# Patient Record
Sex: FEMALE | Race: WHITE | NOT HISPANIC OR LATINO | Employment: FULL TIME | URBAN - METROPOLITAN AREA
[De-identification: names, ages, dates, MRNs, and addresses within clinical notes are randomized per-mention and may not be internally consistent; named-entity substitution may affect disease eponyms.]

---

## 2017-01-16 ENCOUNTER — MYC MEDICAL ADVICE (OUTPATIENT)
Dept: FAMILY MEDICINE | Facility: CLINIC | Age: 47
End: 2017-01-16

## 2017-01-16 DIAGNOSIS — R20.0 BILATERAL LEG NUMBNESS: Primary | ICD-10-CM

## 2017-01-16 NOTE — TELEPHONE ENCOUNTER
Please see WEbook message along with the following office visit notes from 12/21/16:  (R20.2) Numbness and tingling of both legs  Comment: Intermittent bilateral leg weakness and numbness.? Etiology. Will check labs.                                                                                                                                                                                                                                             R/O Rule out restless leg syndrome, vitamin deficiency  Plan: CBC with platelets, Basic metabolic panel, TSH          with free T4 reflex, Vitamin B12, Iron and iron        binding capacity        Follow up based on labs  May need neurology referral depending on results. Also consider scanning patient's back will see if her symptoms resolve before then.    Please advise. Thank you.  Laura Mathias, RN, BSN  [facility]

## 2017-01-18 ENCOUNTER — HOSPITAL ENCOUNTER (OUTPATIENT)
Dept: MRI IMAGING | Facility: CLINIC | Age: 47
Discharge: HOME | End: 2017-01-18
Attending: FAMILY MEDICINE | Admitting: FAMILY MEDICINE
Payer: COMMERCIAL

## 2017-01-18 DIAGNOSIS — R20.0 BILATERAL LEG NUMBNESS: ICD-10-CM

## 2017-01-18 PROCEDURE — 72148 MRI LUMBAR SPINE W/O DYE: CPT

## 2017-01-23 ENCOUNTER — OFFICE VISIT (OUTPATIENT)
Dept: FAMILY MEDICINE | Facility: CLINIC | Age: 47
End: 2017-01-23
Payer: COMMERCIAL

## 2017-01-23 VITALS
BODY MASS INDEX: 35.51 KG/M2 | HEIGHT: 64 IN | SYSTOLIC BLOOD PRESSURE: 132 MMHG | OXYGEN SATURATION: 100 % | DIASTOLIC BLOOD PRESSURE: 88 MMHG | HEART RATE: 97 BPM | WEIGHT: 208 LBS | TEMPERATURE: 98.4 F

## 2017-01-23 DIAGNOSIS — J02.9 ACUTE PHARYNGITIS, UNSPECIFIED ETIOLOGY: Primary | ICD-10-CM

## 2017-01-23 DIAGNOSIS — R07.0 THROAT PAIN: ICD-10-CM

## 2017-01-23 LAB
DEPRECATED S PYO AG THROAT QL EIA: NORMAL
MICRO REPORT STATUS: NORMAL
SPECIMEN SOURCE: NORMAL

## 2017-01-23 PROCEDURE — 87081 CULTURE SCREEN ONLY: CPT | Performed by: PHYSICIAN ASSISTANT

## 2017-01-23 PROCEDURE — 87880 STREP A ASSAY W/OPTIC: CPT | Performed by: PHYSICIAN ASSISTANT

## 2017-01-23 PROCEDURE — 99213 OFFICE O/P EST LOW 20 MIN: CPT | Performed by: PHYSICIAN ASSISTANT

## 2017-01-23 NOTE — MR AVS SNAPSHOT
After Visit Summary   1/23/2017    Hayde Monique    MRN: [id number]           Patient Information     Date Of Birth          1970        Visit Information        Provider Department      1/23/2017 5:40 PM Ludy Sepulveda PA-C [facility]        Today's Diagnoses     Acute pharyngitis, unspecified etiology    -  1     Throat pain           Care Instructions    No evidence for strep or oral lesion.  May be coming down with a new viral process.  Continue supportive measures for now.  Re-check anytime with concerns.  Will call and notify you should your strep culture return positive and treat accordingly at that time.    Electronically Signed By: Ludy Sepulveda PA-C          Follow-ups after your visit        Who to contact     If you have questions or need follow up information about today's clinic visit or your schedule please contact [facility] directly at [phone].  Normal or non-critical lab and imaging results will be communicated to you by MyChart, letter or phone within 4 business days after the clinic has received the results. If you do not hear from us within 7 days, please contact the clinic through [employer]ent[employer] or phone. If you have a critical or abnormal lab result, we will notify you by phone as soon as possible.  Submit refill requests through [employer] or call your pharmacy and they will forward the refill request to us. Please allow 3 business days for your refill to be completed.          Additional Information About Your Visit        MyChart Information     [employer] gives you secure access to your electronic health record. If you see a primary care provider, you can also send messages to your care team and make appointments. If you have questions, please call your primary care clinic.  If you do not have a primary care provider, please call [phone] and they will assist you.        Care EveryWhere ID     This is your Care EveryWhere ID. This  "could be used by other organizations to access your [facility] medical records  [id number]        Your Vitals Were     Pulse Temperature Height BMI (Body Mass Index) Pulse Oximetry Breastfeeding?    97 98.4  F (36.9  C) (Oral) 5' 4\" (1.626 m) 35.69 kg/m2 100% No       Blood Pressure from Last 3 Encounters:   01/23/17 132/88   12/21/16 126/84   08/22/16 130/86    Weight from Last 3 Encounters:   01/23/17 208 lb (94.348 kg)   12/21/16 208 lb (94.348 kg)   08/22/16 204 lb 4 oz (92.647 kg)              We Performed the Following     Beta strep group A culture     Rapid strep screen        Primary Care Provider Office Phone # Fax #    MARY Hansen [facility] [phone]       [facility] [address] [place] [address]        Thank you!     Thank you for choosing [facility]  for your care. Our goal is always to provide you with excellent care. Hearing back from our patients is one way we can continue to improve our services. Please take a few minutes to complete the written survey that you may receive in the mail after your visit with us. Thank you!             Your Updated Medication List - Protect others around you: Learn how to safely use, store and throw away your medicines at www.disposemymeds.org.          This list is accurate as of: 1/23/17  6:30 PM.  Always use your most recent med list.                   Brand Name Dispense Instructions for use    busPIRone 5 MG tablet    BUSPAR    90 tablet    Take 1 tablet (5 mg) by mouth daily       fluticasone 50 MCG/ACT spray    FLONASE    16 g    Spray 2 sprays into both nostrils daily       minocycline 100 MG tablet    DYNACIN    180 tablet    Take 1 tablet (100 mg) by mouth 2 times daily       PARoxetine 40 MG tablet    PAXIL    90 tablet    Take 1 tablet (40 mg) by mouth daily       phentermine 30 MG capsule     30 capsule    Take 1 capsule (30 mg) by mouth every morning         "

## 2017-01-23 NOTE — PROGRESS NOTES
"  SUBJECTIVE:                                                    Hayde Monique is a 46 year old female who presents to clinic today for the following health issues:      Acute Illness   Acute illness concerns: sore throat, ear pain that began this morning. Feels sore when she swallows on the L.  Non-smoker.  No respiratory sx.  Seems actually bother her less when she eats/drinks, but notices with regular swallowing.  Nothing got stuck.  No hx of thyroid disease.  No choking.    Onset: started last week, and ear pain started a few days ago     Fever: no    Chills/Sweats: no    Headache (location?): no    Sinus Pressure:no    Conjunctivitis:  no    Ear Pain: YES- left ear pain    Rhinorrhea: no    Congestion: no    Sore Throat: YES, feels like something sharp is poking her     Cough: no    Wheeze: no    Decreased Appetite: no    Nausea: no    Vomiting: no    Diarrhea:  no    Dysuria/Freq.: no    Fatigue/Achiness: no    Sick/Strep Exposure: no     Therapies Tried and outcome:     Problem list and histories reviewed & adjusted, as indicated.  Additional history: as documented  Problem list, Medication list, Allergies, and Medical/Social/Surgical histories [facility] and updated as appropriate.    Current Outpatient Prescriptions   Medication     busPIRone (BUSPAR) 5 MG tablet     PARoxetine (PAXIL) 40 MG tablet     phentermine 30 MG capsule     fluticasone (FLONASE) 50 MCG/ACT nasal spray     minocycline (DYNACIN) 100 MG tablet     No current facility-administered medications for this visit.      No Known Allergies    O:  /88 mmHg  Pulse 97  Temp(Src) 98.4  F (36.9  C) (Oral)  Ht 5' 4\" (1.626 m)  Wt 208 lb (94.348 kg)  BMI 35.69 kg/m2  SpO2 100%  Breastfeeding? No   Constitutional: Pt is a healthy appearing female, in no distress.  Eyes: Conjunctiva clear.  Ears: External ears and TMs normal BL.  Nose: Septum midline, nasal mucosa pink and moist. No discharge.  Mouth / Throat: Normal dentition.  No " oral lesions. Pharynx non erythematous, tonsils without hypertrophy.  Neck: Supple, no enlarged LN, trachea midline. No thyromegaly or nodularity.  Heart: Normal S1 and S2, RRR, no appreciable murmurs, rubs, or gallops.  Lungs: CTA bilaterally, no rales, rhonchi, or wheezing appreciated.    RSS negative    A/P:    ICD-10-CM    1. Acute pharyngitis, unspecified etiology J02.9    2. Throat pain R07.0 Rapid strep screen     Beta strep group A culture   See pt instructions.    Patient Instructions   No evidence for strep or oral lesion.  May be coming down with a new viral process.  Continue supportive measures for now.  Re-check anytime with concerns.  Will call and notify you should your strep culture return positive and treat accordingly at that time.    Electronically Signed By: Ludy Sepulveda PA-C

## 2017-01-23 NOTE — NURSING NOTE
"Chief Complaint   Patient presents with     Pharyngitis     feels like something sharp is in her throat    Pulse 97  Temp(Src) 98.4  F (36.9  C) (Oral)  Ht 5' 4\" (1.626 m)  Wt 208 lb (94.348 kg)  BMI 35.69 kg/m2  SpO2 100% Body Mass Index is Body mass index is 35.69 kg/(m^2).  BP completed using cuff size : large right arm  Marcella Milton MA            "

## 2017-01-24 NOTE — PATIENT INSTRUCTIONS
No evidence for strep or oral lesion.  May be coming down with a new viral process.  Continue supportive measures for now.  Re-check anytime with concerns.  Will call and notify you should your strep culture return positive and treat accordingly at that time.    Electronically Signed By: Ludy Sepulveda PA-C

## 2017-01-25 LAB
BACTERIA SPEC CULT: NORMAL
MICRO REPORT STATUS: NORMAL
SPECIMEN SOURCE: NORMAL

## 2017-01-26 ENCOUNTER — MYC MEDICAL ADVICE (OUTPATIENT)
Dept: FAMILY MEDICINE | Facility: CLINIC | Age: 47
End: 2017-01-26

## 2017-01-26 NOTE — TELEPHONE ENCOUNTER
Please see RubyRide message. Please advise on MRI results when able. Thank you.  Laura Mathias RN, BSN  [facility]

## 2017-01-30 NOTE — PROGRESS NOTES
Quick Note:    Reviewed in the clinic with patient.  Electronically Signed By: Ludy Sepulveda PA-C    ______

## 2017-02-13 ENCOUNTER — MYC REFILL (OUTPATIENT)
Dept: FAMILY MEDICINE | Facility: CLINIC | Age: 47
End: 2017-02-13

## 2017-02-13 DIAGNOSIS — E66.01 MORBID OBESITY DUE TO EXCESS CALORIES (H): ICD-10-CM

## 2017-02-13 RX ORDER — PHENTERMINE HYDROCHLORIDE 30 MG/1
30 CAPSULE ORAL EVERY MORNING
Qty: 30 CAPSULE | Refills: 0 | Status: SHIPPED | OUTPATIENT
Start: 2017-02-13 | End: 2017-03-15

## 2017-02-13 NOTE — TELEPHONE ENCOUNTER
Message from [employer]:  Original authorizing provider: Karen Weiler, MD [place] Monique would like a refill of the following medications:  phentermine 30 MG capsule [Karen Weiler, MD]    Preferred pharmacy: [id number], [place] - [address] [facility] [address]    Comment:  Hello. I thought the doctor gave me refills for this medication for six months? I only had a 30-day prescription. Am I allowed more? Thank you! Seems to be working well! No issues.

## 2017-03-02 ENCOUNTER — ALLIED HEALTH/NURSE VISIT (OUTPATIENT)
Dept: NURSING | Facility: CLINIC | Age: 47
End: 2017-03-02
Payer: COMMERCIAL

## 2017-03-02 ENCOUNTER — TELEPHONE (OUTPATIENT)
Dept: NURSING | Facility: CLINIC | Age: 47
End: 2017-03-02

## 2017-03-02 VITALS
OXYGEN SATURATION: 99 % | HEART RATE: 92 BPM | SYSTOLIC BLOOD PRESSURE: 134 MMHG | DIASTOLIC BLOOD PRESSURE: 86 MMHG | WEIGHT: 198 LBS | BODY MASS INDEX: 33.99 KG/M2

## 2017-03-02 DIAGNOSIS — Z23 NEED FOR VACCINATION AGAINST HEPATITIS A: Primary | ICD-10-CM

## 2017-03-02 PROCEDURE — 90471 IMMUNIZATION ADMIN: CPT

## 2017-03-02 PROCEDURE — 90632 HEPA VACCINE ADULT IM: CPT

## 2017-03-02 NOTE — PROGRESS NOTES
Hayde Monique is being followed for Blood Pressure management.    NURSING ASSESSMENT/PLAN:  Blood pressure reading today is at the provider specified goal of <140/90.    Current blood pressure medication(s):  None - patient is on phentermine for weight loss  1.  The patient will continue current medication regimen unchanged.     2.  We will not be checking a metabolic lab panel today.      3.  Follow up instructions include:     Next Provider visit: Follow up in as needed.    SUBJECTIVE:                                                    The patient is taking medication as prescribed and is tolerating well.   Patient is not monitoring Blood Pressure at home.     Out of the following complicating factors: Cough, Headache, Lightheadedness, Shortness of breath, Fatigue, Nausea, Sexual Dysfunction, New onset of swelling or edema, Weakness and New onset of Chest Pain, the patient reports:  None    OBJECTIVE:                                                    Is pulse 55 or greater? - Yes  Pulse Readings from Last 1 Encounters:   03/02/17 92     Today's BP completed using cuff size: large on left side  arm.  BP Readings from Last 3 Encounters:   03/02/17 134/86   01/23/17 132/88   12/21/16 126/84       Current Outpatient Prescriptions   Medication Sig Dispense Refill     phentermine 30 MG capsule Take 1 capsule (30 mg) by mouth every morning 30 capsule 0     busPIRone (BUSPAR) 5 MG tablet Take 1 tablet (5 mg) by mouth daily 90 tablet 3     PARoxetine (PAXIL) 40 MG tablet Take 1 tablet (40 mg) by mouth daily 90 tablet 3     fluticasone (FLONASE) 50 MCG/ACT nasal spray Spray 2 sprays into both nostrils daily 16 g 5     minocycline (DYNACIN) 100 MG tablet Take 1 tablet (100 mg) by mouth 2 times daily (Patient taking differently: Take 100 mg by mouth 2 times daily ) 180 tablet 3     Potassium   Date Value Ref Range Status   12/21/2016 4.0 3.4 - 5.3 mmol/L Final     Creatinine   Date Value Ref Range Status   12/21/2016 0.60  0.52 - 1.04 mg/dL Final     Urea Nitrogen   Date Value Ref Range Status   12/21/2016 13 7 - 30 mg/dL Final     GFR Estimate   Date Value Ref Range Status   12/21/2016 >90  Non  GFR Calc   >60 mL/min/1.7m2 Final      A baseline potassium, creatinine, BUN, GFR has been done within past 12 months    Education:  general discussion/verbal explanation  Limit dietary sodium intake between 1500-2000mg every day  Regular aerobic exercise.  Discussed habit change regarding diet/weight loss  Patient was given an opportunity to ask questions.    Patient verbalized understanding of this plan and is agreeable.    Laura Mathias RN

## 2017-03-02 NOTE — MR AVS SNAPSHOT
After Visit Summary   3/2/2017    Hayde Monique    MRN: [id number]           Patient Information     Date Of Birth          1970        Visit Information        Provider Department      3/2/2017 3:45 PM [facility]        Today's Diagnoses     Need for vaccination against hepatitis A    -  1       Follow-ups after your visit        Your next 10 appointments already scheduled     Mar 02, 2017  3:45 PM CST   Nurse Only with [facility]   [facility] [place] [facility])    [address] [place] [address] [phone]              Who to contact     If you have questions or need follow up information about today's clinic visit or your schedule please contact [facility] directly at [phone].  Normal or non-critical lab and imaging results will be communicated to you by MyChart, letter or phone within 4 business days after the clinic has received the results. If you do not hear from us within 7 days, please contact the clinic through [employer] or phone. If you have a critical or abnormal lab result, we will notify you by phone as soon as possible.  Submit refill requests through [employer] or call your pharmacy and they will forward the refill request to us. Please allow 3 business days for your refill to be completed.          Additional Information About Your Visit        MyChart Information     [employer] gives you secure access to your electronic health record. If you see a primary care provider, you can also send messages to your care team and make appointments. If you have questions, please call your primary care clinic.  If you do not have a primary care provider, please call [phone] and they will assist you.        Care EveryWhere ID     This is your Care EveryWhere ID. This could be used by other organizations to access your [facility] medical records  [id number]        Your Vitals Were     Pulse Pulse  Oximetry BMI (Body Mass Index)             92 99% 33.99 kg/m2          Blood Pressure from Last 3 Encounters:   03/02/17 134/86   01/23/17 132/88   12/21/16 126/84    Weight from Last 3 Encounters:   03/02/17 198 lb (89.8 kg)   01/23/17 208 lb (94.3 kg)   12/21/16 208 lb (94.3 kg)              We Performed the Following     HEPATITIS A VACCINE (ADULT)        Primary Care Provider Office Phone # Fax #    Daisy MARY Wilburn [phone]       [facility] [address] [place] [address]        Thank you!     Thank you for choosing [facility]  for your care. Our goal is always to provide you with excellent care. Hearing back from our patients is one way we can continue to improve our services. Please take a few minutes to complete the written survey that you may receive in the mail after your visit with us. Thank you!             Your Updated Medication List - Protect others around you: Learn how to safely use, store and throw away your medicines at www.disposemymeds.org.          This list is accurate as of: 3/2/17  3:33 PM.  Always use your most recent med list.                   Brand Name Dispense Instructions for use    busPIRone 5 MG tablet    BUSPAR    90 tablet    Take 1 tablet (5 mg) by mouth daily       fluticasone 50 MCG/ACT spray    FLONASE    16 g    Spray 2 sprays into both nostrils daily       minocycline 100 MG tablet    DYNACIN    180 tablet    Take 1 tablet (100 mg) by mouth 2 times daily       PARoxetine 40 MG tablet    PAXIL    90 tablet    Take 1 tablet (40 mg) by mouth daily       phentermine 30 MG capsule     30 capsule    Take 1 capsule (30 mg) by mouth every morning

## 2017-03-15 DIAGNOSIS — E66.01 MORBID OBESITY DUE TO EXCESS CALORIES (H): ICD-10-CM

## 2017-03-15 NOTE — TELEPHONE ENCOUNTER
phentermine 30 MG capsule      Last Written Prescription Date:  2/13/2017  Last Fill Quantity: 30 capsule,   # refills: 0  Last Office Visit with [facility], [facility] or [facility] prescribing provider: 1/23/2017  Future Office visit:       Routing refill request to provider for review/approval because:  Drug not on the [facility], [facility] or  [facility] refill protocol or controlled substance

## 2017-03-19 RX ORDER — PHENTERMINE HYDROCHLORIDE 30 MG/1
30 CAPSULE ORAL EVERY MORNING
Qty: 30 CAPSULE | Refills: 0 | Status: SHIPPED | OUTPATIENT
Start: 2017-03-19 | End: 2017-05-31

## 2017-03-27 ENCOUNTER — OFFICE VISIT (OUTPATIENT)
Dept: FAMILY MEDICINE | Facility: CLINIC | Age: 47
End: 2017-03-27
Payer: COMMERCIAL

## 2017-03-27 VITALS
SYSTOLIC BLOOD PRESSURE: 138 MMHG | OXYGEN SATURATION: 100 % | HEIGHT: 64 IN | TEMPERATURE: 98.1 F | HEART RATE: 103 BPM | BODY MASS INDEX: 33.8 KG/M2 | DIASTOLIC BLOOD PRESSURE: 96 MMHG | WEIGHT: 198 LBS

## 2017-03-27 DIAGNOSIS — Z80.3 FAMILY HISTORY OF BREAST CANCER: ICD-10-CM

## 2017-03-27 DIAGNOSIS — N63.12 BREAST LUMP ON RIGHT SIDE AT 1 O'CLOCK POSITION: Primary | ICD-10-CM

## 2017-03-27 PROCEDURE — 99213 OFFICE O/P EST LOW 20 MIN: CPT | Performed by: PHYSICIAN ASSISTANT

## 2017-03-27 NOTE — MR AVS SNAPSHOT
After Visit Summary   3/27/2017    Hayde Monique    MRN: [id number]           Patient Information     Date Of Birth          1970        Visit Information        Provider Department      3/27/2017 4:40 PM Ludy Sepulveda PA-C [facility]        Today's Diagnoses     Breast lump on right side at 1 o'clock position    -  1    Family history of breast cancer          Care Instructions    Unclear if this is a superficial cyst versus something more sinister.  Let's obtain further evaluation with diagnostic mammogram.  F/u pending results.    Electronically Signed By: Ludy Sepulveda PA-C          Follow-ups after your visit        Future tests that were ordered for you today     Open Future Orders        Priority Expected Expires Ordered    MA Diagnostic w Implants Bilateral Routine  3/27/2018 3/27/2017            Who to contact     If you have questions or need follow up information about today's clinic visit or your schedule please contact [facility] directly at [phone].  Normal or non-critical lab and imaging results will be communicated to you by [employer], letter or phone within 4 business days after the clinic has received the results. If you do not hear from us within 7 days, please contact the clinic through [employer] or phone. If you have a critical or abnormal lab result, we will notify you by phone as soon as possible.  Submit refill requests through [employer] or call your pharmacy and they will forward the refill request to us. Please allow 3 business days for your refill to be completed.          Additional Information About Your Visit        Latest [employer] Information     [employer] gives you secure access to your electronic health record. If you see a primary care provider, you can also send messages to your care team and make appointments. If you have questions, please call your primary care clinic.  If you do not have a primary care provider, please call  "[phone] and they will assist you.        Care EveryWhere ID     This is your Care EveryWhere ID. This could be used by other organizations to access your [facility] medical records  [id number]        Your Vitals Were     Pulse Temperature Height Pulse Oximetry Breastfeeding? BMI (Body Mass Index)    103 98.1  F (36.7  C) (Oral) 5' 4\" (1.626 m) 100% No 33.99 kg/m2       Blood Pressure from Last 3 Encounters:   03/27/17 (!) 138/96   03/02/17 134/86   01/23/17 132/88    Weight from Last 3 Encounters:   03/27/17 198 lb (89.8 kg)   03/02/17 198 lb (89.8 kg)   01/23/17 208 lb (94.3 kg)               Primary Care Provider Office Phone # Fax #    MARY Hansen [phone]       [facility] [address] [place] [address]        Thank you!     Thank you for choosing [facility]  for your care. Our goal is always to provide you with excellent care. Hearing back from our patients is one way we can continue to improve our services. Please take a few minutes to complete the written survey that you may receive in the mail after your visit with us. Thank you!             Your Updated Medication List - Protect others around you: Learn how to safely use, store and throw away your medicines at www.disposemymeds.org.          This list is accurate as of: 3/27/17  5:06 PM.  Always use your most recent med list.                   Brand Name Dispense Instructions for use    busPIRone 5 MG tablet    BUSPAR    90 tablet    Take 1 tablet (5 mg) by mouth daily       fluticasone 50 MCG/ACT spray    FLONASE    16 g    Spray 2 sprays into both nostrils daily       minocycline 100 MG tablet    DYNACIN    180 tablet    Take 1 tablet (100 mg) by mouth 2 times daily       PARoxetine 40 MG tablet    PAXIL    90 tablet    Take 1 tablet (40 mg) by mouth daily       phentermine 30 MG capsule     30 capsule    Take 1 capsule (30 mg) by mouth every morning         "

## 2017-03-27 NOTE — PATIENT INSTRUCTIONS
Unclear if this is a superficial cyst versus something more sinister.  Let's obtain further evaluation with diagnostic mammogram.  F/u pending results.    Electronically Signed By: Ludy Sepulveda PA-C

## 2017-03-27 NOTE — PROGRESS NOTES
SUBJECTIVE:                                                    Hayde Monique is a 46 year old female who presents to clinic today for the following health issues:      Concern - breast lump     Onset: noticed it two weeks ago - found incidentally when just palpating her chest. No pain or drainage.    No chest injury.     No change in the past 2 weeks.     S/p breast augmentation in     Fhx: maternal grandma with breast cancer. No ovarian cancer.    No other lumps, masses or nipple discharge.     LMP:  - denies possibility of pregnancy as  had a vasectomy. Period is later than usual. Offered pregnancy test, but pt declined as took one at home today and was neg.       Description:   Notice right breast lump two weeks ago, has not changed in that time but it concerned.  Not panful    Intensity: mild    Progression of Symptoms:  same    Accompanying Signs & Symptoms:         Previous history of similar problem:   No injury, grandmother on mother's side had breast cancer at 55    Precipitating factors:   Worsened by:     Alleviating factors:  Improved by:        Therapies Tried and outcome: none    Problem list and histories reviewed & adjusted, as indicated.  Additional history: as documented    Patient Active Problem List   Diagnosis     CARDIOVASCULAR SCREENING; LDL GOAL LESS THAN 160     OCD (obsessive compulsive disorder)     Anxiety attack     Obesity     Heel pain     Major depression     Generalized anxiety disorder     Dermatofibroma     Melasma     Past Surgical History:   Procedure Laterality Date     BREAST SURGERY      implants       SECTION       orif leg  1995    right        Social History   Substance Use Topics     Smoking status: Never Smoker     Smokeless tobacco: Never Used     Alcohol use 0.0 oz/week     0 Standard drinks or equivalent per week      Comment: 4 drinks on the weekend..     Family History   Problem Relation Age of Onset     CANCER Mother 55     Lung cancer  "        Current Outpatient Prescriptions   Medication Sig Dispense Refill     phentermine 30 MG capsule Take 1 capsule (30 mg) by mouth every morning 30 capsule 0     busPIRone (BUSPAR) 5 MG tablet Take 1 tablet (5 mg) by mouth daily 90 tablet 3     PARoxetine (PAXIL) 40 MG tablet Take 1 tablet (40 mg) by mouth daily 90 tablet 3     fluticasone (FLONASE) 50 MCG/ACT nasal spray Spray 2 sprays into both nostrils daily 16 g 5     minocycline (DYNACIN) 100 MG tablet Take 1 tablet (100 mg) by mouth 2 times daily (Patient taking differently: Take 100 mg by mouth 2 times daily ) 180 tablet 3     No Known Allergies    Reviewed and updated as needed this visit by clinical staff       Reviewed and updated as needed this visit by Provider         ROS:  CONSTITUTIONAL:NEGATIVE for fever, chills, change in weight  INTEGUMENTARY/SKIN: NEGATIVE for worrisome rashes, moles or lesions  BREAST: POSITIVE for lump - see notes above in HPI  : + for lighter periods, but otherwise neg.  MUSCULOSKELETAL: NEGATIVE for significant arthralgias or myalgia    OBJECTIVE:                                                    BP (!) 138/96 (BP Location: Right arm, Cuff Size: Adult Regular)  Pulse 103  Temp 98.1  F (36.7  C) (Oral)  Ht 5' 4\" (1.626 m)  Wt 198 lb (89.8 kg)  SpO2 100%  Breastfeeding? No  BMI 33.99 kg/m2  Body mass index is 33.99 kg/(m^2).  GENERAL: healthy, alert and no distress  EYES: Eyes grossly normal to inspection, PERRL and conjunctivae and sclerae normal  NECK: no adenopathy, no asymmetry, masses, or scars and thyroid normal to palpation  RESP: lungs clear to auscultation - no rales, rhonchi or wheezes  BREAST: 5mm superficial cyst-like lump in 1'o'clock position of R breast 12 cm from the nipple. Has bilateral what appear to be sub-pectoral breast implants. Otherwise normal without masses, tenderness or nipple discharge and no palpable axillary masses or adenopathy  CV: regular rate and rhythm, normal S1 S2, no S3 or " S4, no murmur, click or rub, no peripheral edema and peripheral pulses strong    Diagnostic Test Results:  pending     ASSESSMENT/PLAN:                                                        ICD-10-CM    1. Breast lump on right side at 1 o'clock position N63 MA Diagnostic w Implants Bilateral   2. Family history of breast cancer Z80.3      See Patient Instructions  Patient Instructions   Unclear if this is a superficial cyst versus something more sinister.  Let's obtain further evaluation with diagnostic mammogram.  F/u pending results.    Electronically Signed By: Ludy Sepulveda PA-C

## 2017-03-27 NOTE — NURSING NOTE
"Chief Complaint   Patient presents with     Breast Mass       Initial BP (!) 138/96 (BP Location: Right arm, Cuff Size: Adult Regular)  Pulse 103  Temp 98.1  F (36.7  C) (Oral)  Ht 5' 4\" (1.626 m)  Wt 198 lb (89.8 kg)  SpO2 100%  Breastfeeding? No  BMI 33.99 kg/m2 Estimated body mass index is 33.99 kg/(m^2) as calculated from the following:    Height as of this encounter: 5' 4\" (1.626 m).    Weight as of this encounter: 198 lb (89.8 kg).  Medication Reconciliation: complete    "

## 2017-05-31 DIAGNOSIS — E66.01 MORBID OBESITY DUE TO EXCESS CALORIES (H): ICD-10-CM

## 2017-06-01 NOTE — TELEPHONE ENCOUNTER
phentermine 30 MG capsule      Last Written Prescription Date:  3/19/2017  Last Fill Quantity: 30 capsule,   # refills: 0  Last Office Visit with [facility], [facility] or [facility] prescribing provider: 3/27/2017  Future Office visit:       Routing refill request to provider for review/approval because:  Drug not on the [facility], [facility] or  [facility] refill protocol or controlled substance

## 2017-06-01 NOTE — TELEPHONE ENCOUNTER
Routing refill request to provider for review/approval because:  Drug not on the [facility] refill protocol     Sofia Morrison RN, BSN  [place] [facility]

## 2017-06-08 RX ORDER — PHENTERMINE HYDROCHLORIDE 30 MG/1
CAPSULE ORAL
Qty: 30 CAPSULE | Refills: 0 | Status: SHIPPED | OUTPATIENT
Start: 2017-06-08 | End: 2017-10-20

## 2017-06-12 ENCOUNTER — MYC MEDICAL ADVICE (OUTPATIENT)
Dept: FAMILY MEDICINE | Facility: CLINIC | Age: 47
End: 2017-06-12

## 2017-06-12 ENCOUNTER — NURSE TRIAGE (OUTPATIENT)
Dept: NURSING | Facility: CLINIC | Age: 47
End: 2017-06-12

## 2017-06-12 NOTE — TELEPHONE ENCOUNTER
Patient calling to check on status of phentermine Rx. Placed call to [facility] in [place] stating they have received Rx by fax 6/8/17.  Patient notified.    Violet Young RN  [facility]

## 2017-06-14 ENCOUNTER — MYC MEDICAL ADVICE (OUTPATIENT)
Dept: FAMILY MEDICINE | Facility: CLINIC | Age: 47
End: 2017-06-14

## 2017-10-20 ENCOUNTER — OFFICE VISIT (OUTPATIENT)
Dept: FAMILY MEDICINE | Facility: CLINIC | Age: 47
End: 2017-10-20
Payer: COMMERCIAL

## 2017-10-20 VITALS
HEART RATE: 95 BPM | WEIGHT: 195 LBS | BODY MASS INDEX: 33.29 KG/M2 | HEIGHT: 64 IN | DIASTOLIC BLOOD PRESSURE: 80 MMHG | TEMPERATURE: 98.5 F | SYSTOLIC BLOOD PRESSURE: 126 MMHG | OXYGEN SATURATION: 99 %

## 2017-10-20 DIAGNOSIS — M79.601 RIGHT UPPER LIMB PAIN: Primary | ICD-10-CM

## 2017-10-20 PROCEDURE — 99213 OFFICE O/P EST LOW 20 MIN: CPT | Performed by: FAMILY MEDICINE

## 2017-10-20 ASSESSMENT — ANXIETY QUESTIONNAIRES
2. NOT BEING ABLE TO STOP OR CONTROL WORRYING: SEVERAL DAYS
5. BEING SO RESTLESS THAT IT IS HARD TO SIT STILL: SEVERAL DAYS
3. WORRYING TOO MUCH ABOUT DIFFERENT THINGS: SEVERAL DAYS
6. BECOMING EASILY ANNOYED OR IRRITABLE: SEVERAL DAYS
1. FEELING NERVOUS, ANXIOUS, OR ON EDGE: SEVERAL DAYS
7. FEELING AFRAID AS IF SOMETHING AWFUL MIGHT HAPPEN: NOT AT ALL
IF YOU CHECKED OFF ANY PROBLEMS ON THIS QUESTIONNAIRE, HOW DIFFICULT HAVE THESE PROBLEMS MADE IT FOR YOU TO DO YOUR WORK, TAKE CARE OF THINGS AT HOME, OR GET ALONG WITH OTHER PEOPLE: NOT DIFFICULT AT ALL
GAD7 TOTAL SCORE: 6

## 2017-10-20 ASSESSMENT — PATIENT HEALTH QUESTIONNAIRE - PHQ9
5. POOR APPETITE OR OVEREATING: SEVERAL DAYS
SUM OF ALL RESPONSES TO PHQ QUESTIONS 1-9: 0

## 2017-10-20 NOTE — PATIENT INSTRUCTIONS
Icing Treatment  1.  Ice 15 minutes 3-4 times a day for one week then as needed.  2.  Can also apply heat if you wish in between ice treatments.  3.  Tylenol extra strength two tablets three times a day for one week then as needed.  4.  Keep active as possible.

## 2017-10-20 NOTE — LETTER
My Depression Action Plan  Name: Hayde Monique   Date of Birth 1970  Date: 10/20/2017    My doctor: Daisy Almonte   My clinic: [facility] [place]  [address]  [place] [address] [phone]          GREEN    ZONE   Good Control    What it looks like:     Things are going generally well. You have normal up s and down s. You may even feel depressed from time to time, but bad moods usually last less than a day.   What you need to do:  1. Continue to care for yourself (see self care plan)  2. Check your depression survival kit and update it as needed  3. Follow your physician s recommendations including any medication.  4. Do not stop taking medication unless you consult with your physician first.           YELLOW         ZONE Getting Worse    What it looks like:     Depression is starting to interfere with your life.     It may be hard to get out of bed; you may be starting to isolate yourself from others.    Symptoms of depression are starting to last most all day and this has happened for several days.     You may have suicidal thoughts but they are not constant.   What you need to do:     1. Call your care team, your response to treatment will improve if you keep your care team informed of your progress. Yellow periods are signs an adjustment may need to be made.     2. Continue your self-care, even if you have to fake it!    3. Talk to someone in your support network    4. Open up your depression survival kit           RED    ZONE Medical Alert - Get Help    What it looks like:     Depression is seriously interfering with your life.     You may experience these or other symptoms: You can t get out of bed most days, can t work or engage in other necessary activities, you have trouble taking care of basic hygiene, or basic responsibilities, thoughts of suicide or death that will not go away, self-injurious behavior.     What you need to do:  1. Call your care team and  request a same-day appointment. If they are not available (weekends or after hours) call your local crisis line, emergency room or 911.      Electronically signed by: Pamella Jc, October 20, 2017    Depression Self Care Plan / Survival Kit    Self-Care for Depression  Here s the deal. Your body and mind are really not as separate as most people think.  What you do and think affects how you feel and how you feel influences what you do and think. This means if you do things that people who feel good do, it will help you feel better.  Sometimes this is all it takes.  There is also a place for medication and therapy depending on how severe your depression is, so be sure to consult with your medical provider and/ or Behavioral Health Consultant if your symptoms are worsening or not improving.     In order to better manage my stress, I will:    Exercise  Get some form of exercise, every day. This will help reduce pain and release endorphins, the  feel good  chemicals in your brain. This is almost as good as taking antidepressants!  This is not the same as joining a gym and then never going! (they count on that by the way ) It can be as simple as just going for a walk or doing some gardening, anything that will get you moving.      Hygiene   Maintain good hygiene (Get out of bed in the morning, Make your bed, Brush your teeth, Take a shower, and Get dressed like you were going to work, even if you are unemployed).  If your clothes don't fit try to get ones that do.    Diet  I will strive to eat foods that are good for me, drink plenty of water, and avoid excessive sugar, caffeine, alcohol, and other mood-altering substances.  Some foods that are helpful in depression are: complex carbohydrates, B vitamins, flaxseed, fish or fish oil, fresh fruits and vegetables.    Psychotherapy  I agree to participate in Individual Therapy (if recommended).    Medication  If prescribed medications, I agree to take them.  Missing  doses can result in serious side effects.  I understand that drinking alcohol, or other illicit drug use, may cause potential side effects.  I will not stop my medication abruptly without first discussing it with my provider.    Staying Connected With Others  I will stay in touch with my friends, family members, and my primary care provider/team.    Use your imagination  Be creative.  We all have a creative side; it doesn t matter if it s oil painting, sand castles, or mud pies! This will also kick up the endorphins.    Witness Beauty  (AKA stop and smell the roses) Take a look outside, even in mid-winter. Notice colors, textures. Watch the squirrels and birds.     Service to others  Be of service to others.  There is always someone else in need.  By helping others we can  get out of ourselves  and remember the really important things.  This also provides opportunities for practicing all the other parts of the program.    Humor  Laugh and be silly!  Adjust your TV habits for less news and crime-drama and more comedy.    Control your stress  Try breathing deep, massage therapy, biofeedback, and meditation. Find time to relax each day.     My support system    Clinic Contact:  Phone number:    Contact 1:  Phone number:    Contact 2:  Phone number:    Orthodoxy/:  Phone number:    Therapist:  Phone number:    Local crisis center:    Phone number:    Other community support:  Phone number:

## 2017-10-20 NOTE — MR AVS SNAPSHOT
"              After Visit Summary   10/20/2017    Hayde Monique    MRN: [id number]           Patient Information     Date Of Birth          1970        Visit Information        Provider Department      10/20/2017 2:00 PM Dominik Arizmendi, DO [facility] [place]        Today's Diagnoses     Right upper limb pain    -  1      Care Instructions    Icing Treatment  1.  Ice 15 minutes 3-4 times a day for one week then as needed.  2.  Can also apply heat if you wish in between ice treatments.  3.  Tylenol extra strength two tablets three times a day for one week then as needed.  4.  Keep active as possible.            Follow-ups after your visit        Additional Services     PHYSICAL THERAPY REFERRAL       *This therapy referral will be filtered to a centralized scheduling office at [facility] and the patient will receive a call to schedule an appointment at a [place] location most convenient for them. *     [facility] provides Physical Therapy evaluation and treatment and many specialty services across the [place] system.  If requesting a specialty program, please choose from the list below.    If you have not heard from the scheduling office within 2 business days, please call [phone] for all locations, with the exception of [place], please call [phone].  Treatment: Evaluation & Treatment  Special Instructions/Modalities: none  Special Programs: None    Please be aware that coverage of these services is subject to the terms and limitations of your health insurance plan.  Call member services at your health plan with any benefit or coverage questions.      **Note to Provider:  If you are referring outside of [place] for the therapy appointment, please list the name of the location in the \"special instructions\" above, print the referral and give to the patient to schedule the appointment.                  Follow-up notes from your care team     Return in " "about 1 month (around 11/20/2017), or if symptoms worsen or fail to improve.      Who to contact     If you have questions or need follow up information about today's clinic visit or your schedule please contact [facility] [place] directly at [phone].  Normal or non-critical lab and imaging results will be communicated to you by MyChart, letter or phone within 4 business days after the clinic has received the results. If you do not hear from us within 7 days, please contact the clinic through streamithart or phone. If you have a critical or abnormal lab result, we will notify you by phone as soon as possible.  Submit refill requests through [facility] or call your pharmacy and they will forward the refill request to us. Please allow 3 business days for your refill to be completed.          Additional Information About Your Visit        streamithart Information     [facility] gives you secure access to your electronic health record. If you see a primary care provider, you can also send messages to your care team and make appointments. If you have questions, please call your primary care clinic.  If you do not have a primary care provider, please call [phone] and they will assist you.        Care EveryWhere ID     This is your Care EveryWhere ID. This could be used by other organizations to access your [place] medical records  [id number]        Your Vitals Were     Pulse Temperature Height Pulse Oximetry BMI (Body Mass Index)       95 98.5  F (36.9  C) (Oral) 5' 4\" (1.626 m) 99% 33.47 kg/m2        Blood Pressure from Last 3 Encounters:   10/20/17 126/80   03/27/17 (!) 138/96   03/02/17 134/86    Weight from Last 3 Encounters:   10/20/17 195 lb (88.5 kg)   03/27/17 198 lb (89.8 kg)   03/02/17 198 lb (89.8 kg)              We Performed the Following     DEPRESSION ACTION PLAN (DAP)     PHYSICAL THERAPY REFERRAL        Primary Care Provider Office Phone # Fax #    MARY Hansen [phone] " [phone]       [address] [place] [address]        Equal Access to Services     KIARRAPEPITO STEF : Hadii aad ku hadrocbreanna Annieabena, waaxda luqadaha, qaybta kaalmada lylesocrates, waxbruno eller memejorge luis alvarengaitz andreaestuardo kathia alvarez. So [facility] [phone].    ATENCIÓN: Si habla español, tiene a au disposición servicios gratuitos de asistencia lingüística. Llame al [phone].    We comply with applicable federal civil rights laws and [place] laws. We do not discriminate on the basis of race, color, national origin, age, disability, sex, sexual orientation, or gender identity.            Thank you!     Thank you for choosing [facility]  for your care. Our goal is always to provide you with excellent care. Hearing back from our patients is one way we can continue to improve our services. Please take a few minutes to complete the written survey that you may receive in the mail after your visit with us. Thank you!             Your Updated Medication List - Protect others around you: Learn how to safely use, store and throw away your medicines at www.disposemymeds.org.          This list is accurate as of: 10/20/17  2:25 PM.  Always use your most recent med list.                   Brand Name Dispense Instructions for use Diagnosis    busPIRone 5 MG tablet    BUSPAR    90 tablet    Take 1 tablet (5 mg) by mouth daily    Anxiety attack, Major depressive disorder, recurrent, severe without psychotic features (H), Generalized anxiety disorder       fluticasone 50 MCG/ACT spray    FLONASE    16 g    Spray 2 sprays into both nostrils daily    Seasonal allergic rhinitis       minocycline 100 MG tablet    DYNACIN    180 tablet    Take 1 tablet (100 mg) by mouth 2 times daily    Acne       PARoxetine 40 MG tablet    PAXIL    90 tablet    Take 1 tablet (40 mg) by mouth daily    OCD (obsessive compulsive disorder), Major depressive disorder, recurrent, severe without psychotic features (H), Generalized anxiety  disorder

## 2017-10-20 NOTE — PROGRESS NOTES
SUBJECTIVE:                                                    Hayde Monique is a 47 year old female who presents to clinic today for the following health issues:      Shoulder Pain    Onset: 3 months    Description:   Location: right shoulder   Character: constant ache - limited range of motion lifting arm    Intensity: at night 7/10  - all day about 5-6/10    Progression of Symptoms: worse    Accompanying Signs & Symptoms:  Other symptoms: none    History:   Previous similar pain: no       Precipitating factors:   Trauma or overuse: YES- overuse using computer mouse all day with work     Alleviating factors:  Improved by: nothing    Therapies Tried and outcome:  Stretching, ibuprofen with slight temprary relief     She works at a desk on a computer. Right arm is painful to lie on. Pain localized to right upper arm rather than actual shoulder. She has not tried using ice or heat. No inciting injury or trauma.    Problem list and histories reviewed & adjusted, as indicated.  Additional history: as documented    Patient Active Problem List   Diagnosis     CARDIOVASCULAR SCREENING; LDL GOAL LESS THAN 160     OCD (obsessive compulsive disorder)     Anxiety attack     Obesity     Heel pain     Major depression     Generalized anxiety disorder     Dermatofibroma     Melasma     Family history of breast cancer     Past Surgical History:   Procedure Laterality Date     BREAST SURGERY      implants       SECTION       orif leg  1995    right        Social History   Substance Use Topics     Smoking status: Never Smoker     Smokeless tobacco: Never Used     Alcohol use 0.0 oz/week     0 Standard drinks or equivalent per week      Comment: 4 drinks on the weekend..     Family History   Problem Relation Age of Onset     CANCER Mother 55     Lung cancer         Current Outpatient Prescriptions   Medication Sig Dispense Refill     busPIRone (BUSPAR) 5 MG tablet Take 1 tablet (5 mg) by mouth daily 90 tablet 3      "PARoxetine (PAXIL) 40 MG tablet Take 1 tablet (40 mg) by mouth daily 90 tablet 3     fluticasone (FLONASE) 50 MCG/ACT nasal spray Spray 2 sprays into both nostrils daily 16 g 5     minocycline (DYNACIN) 100 MG tablet Take 1 tablet (100 mg) by mouth 2 times daily (Patient taking differently: Take 100 mg by mouth 2 times daily ) 180 tablet 3     No Known Allergies      ROS:  Constitutional, HEENT, cardiovascular, pulmonary, gi and gu systems are negative, except as otherwise noted.      OBJECTIVE:   /80 (BP Location: Right arm, Patient Position: Chair, Cuff Size: Adult Large)  Pulse 95  Temp 98.5  F (36.9  C) (Oral)  Ht 5' 4\" (1.626 m)  Wt 195 lb (88.5 kg)  SpO2 99%  BMI 33.47 kg/m2  Body mass index is 33.47 kg/(m^2).  GENERAL: healthy, alert and no distress  MS: no gross musculoskeletal defects noted, no edema  MS: neck exam shows normal strength and ROM is normal and negative Spurlings. Pain in upper arm localized to right triceps and worse with resisted extension.  Shoulder Exam:  Exam of: right  Inspection:   Swelling is: Absent   Ecchymosis is: Absent    Palpation:    Tenderness: Present over right triceps  Range of Motion is: Normal   External Rotation is: Normal  Internal Rotation is: Normal  SS (empty can) test is: Normal  Barrera test is: Normal  Neers test is: Normal  Crossover adduction test is: Normal  Speed's test is: Normal  Distal arm exam is within normal limits.  NEURO: Normal strength and tone, mentation intact and speech normal    Diagnostic Test Results:  none     ASSESSMENT/PLAN:   1. Right upper limb pain: symptoms more localized to triceps rather than shoulder. No elbow pain either. Recommend symptomatic management with ice/heat, acetaminophen, ibuprofen. Will also place referral to physical therapy. If symptoms progressive and not improving with therapy and symptomatic management, could pursue imaging of upper arm.  - PHYSICAL THERAPY REFERRAL      DO TEA Shepherd " [facility] [place]

## 2017-10-20 NOTE — NURSING NOTE
"Chief Complaint   Patient presents with     Shoulder Pain     Initial /80 (BP Location: Right arm, Patient Position: Chair, Cuff Size: Adult Large)  Pulse 95  Temp 98.5  F (36.9  C) (Oral)  Ht 5' 4\" (1.626 m)  Wt 195 lb (88.5 kg)  SpO2 99%  BMI 33.47 kg/m2 Estimated body mass index is 33.47 kg/(m^2) as calculated from the following:    Height as of this encounter: 5' 4\" (1.626 m).    Weight as of this encounter: 195 lb (88.5 kg).  BP completed using cuff size large right Arm  Miesha Lindysujatha CMA    "

## 2017-10-21 ASSESSMENT — ANXIETY QUESTIONNAIRES: GAD7 TOTAL SCORE: 6

## 2017-10-22 ENCOUNTER — HEALTH MAINTENANCE LETTER (OUTPATIENT)
Age: 47
End: 2017-10-22

## 2017-10-24 ENCOUNTER — RADIANT APPOINTMENT (OUTPATIENT)
Dept: GENERAL RADIOLOGY | Facility: CLINIC | Age: 47
End: 2017-10-24
Attending: FAMILY MEDICINE
Payer: COMMERCIAL

## 2017-10-24 DIAGNOSIS — M79.622 PAIN OF LEFT UPPER ARM: Primary | ICD-10-CM

## 2017-10-24 DIAGNOSIS — M79.622 PAIN OF LEFT UPPER ARM: ICD-10-CM

## 2017-10-24 PROCEDURE — 73060 X-RAY EXAM OF HUMERUS: CPT | Mod: RT

## 2017-10-24 NOTE — PROGRESS NOTES
Patient presented to clinic with left upper arm pain. She had tenderness over triceps and with movement of arm. No history of trauma or injury to suggest fracture, and physical therapy recommended. Patient having ongoing pain and stated she had already been doing physical therapy on her own. Will order XR to evaluate for any bony abnormality.    oDminik Arizmendi DO  10/24/2017 11:46 AM

## 2017-10-25 DIAGNOSIS — M79.621 PAIN OF RIGHT UPPER ARM: Primary | ICD-10-CM

## 2017-10-31 ENCOUNTER — RADIANT APPOINTMENT (OUTPATIENT)
Dept: GENERAL RADIOLOGY | Facility: CLINIC | Age: 47
End: 2017-10-31
Attending: ORTHOPAEDIC SURGERY
Payer: COMMERCIAL

## 2017-10-31 ENCOUNTER — TELEPHONE (OUTPATIENT)
Dept: ORTHOPEDICS | Facility: CLINIC | Age: 47
End: 2017-10-31

## 2017-10-31 ENCOUNTER — OFFICE VISIT (OUTPATIENT)
Dept: ORTHOPEDICS | Facility: CLINIC | Age: 47
End: 2017-10-31
Payer: COMMERCIAL

## 2017-10-31 VITALS
HEIGHT: 64 IN | BODY MASS INDEX: 33.29 KG/M2 | SYSTOLIC BLOOD PRESSURE: 118 MMHG | DIASTOLIC BLOOD PRESSURE: 88 MMHG | WEIGHT: 195 LBS

## 2017-10-31 DIAGNOSIS — M25.511 CHRONIC RIGHT SHOULDER PAIN: Primary | ICD-10-CM

## 2017-10-31 DIAGNOSIS — G89.29 CHRONIC RIGHT SHOULDER PAIN: Primary | ICD-10-CM

## 2017-10-31 DIAGNOSIS — G89.29 CHRONIC RIGHT SHOULDER PAIN: ICD-10-CM

## 2017-10-31 DIAGNOSIS — M25.511 CHRONIC RIGHT SHOULDER PAIN: ICD-10-CM

## 2017-10-31 DIAGNOSIS — M75.40 IMPINGEMENT SYNDROME OF SHOULDER REGION, UNSPECIFIED LATERALITY: Primary | ICD-10-CM

## 2017-10-31 PROCEDURE — 73030 X-RAY EXAM OF SHOULDER: CPT | Mod: RT | Performed by: ORTHOPAEDIC SURGERY

## 2017-10-31 PROCEDURE — 99203 OFFICE O/P NEW LOW 30 MIN: CPT | Performed by: ORTHOPAEDIC SURGERY

## 2017-10-31 NOTE — PROGRESS NOTES
HISTORY OF PRESENT ILLNESS:    Hayde Monique is a 47 year old female who is seen in consultation at the request of Dr. Arizmendi for right shoulder, upper arm pain.    Present symptoms: Pt states shoulder / arm pain has been present since April, 6 months ago.  Pt denies any known incident or injury to the shoulder.  Pt states around the time the pain started she had been playing softball with her daughter, feels work activity reaching and using the keyboard / mouse will increase pain in the arm as well.  Pt states pain is in the anterior shoulder and goes down the upper arm, describes pain aching, sharp pain with movement (extension).    Treatments tried to this point: stretching, ibuprofen  Orthopedic PMH: right lower leg - ORIF     Past Medical History:   Diagnosis Date     Anxiety attack      LSIL (low grade squamous intraepithelial lesion) on Pap smear 10/2010    2012 pap/hpv neg     OCD (obsessive compulsive disorder)        Past Surgical History:   Procedure Laterality Date     BREAST SURGERY      implants       SECTION       orif leg Right     right lower leg, rodding     WRIST SURGERY Left     Left wrist, cyst excision       Family History   Problem Relation Age of Onset     CANCER Mother 55     Lung cancer     Rheumatoid Arthritis Sister      Breast Cancer Maternal Grandmother      Rheumatoid Arthritis Paternal Grandmother        Social History     Social History     Marital status:      Spouse name: N/A     Number of children: N/A     Years of education: N/A     Occupational History     Not on file.     Social History Main Topics     Smoking status: Never Smoker     Smokeless tobacco: Never Used     Alcohol use 0.0 oz/week     0 Standard drinks or equivalent per week      Comment: 4 drinks on the weekend..     Drug use: No     Sexual activity: Yes     Partners: Male     Other Topics Concern     Parent/Sibling W/ Cabg, Mi Or Angioplasty Before 65f 55m? No      Service No  "    Blood Transfusions No     Caffeine Concern Yes     2 pops a day      Self-Exams Yes     Social History Narrative       Current Outpatient Prescriptions   Medication Sig Dispense Refill     busPIRone (BUSPAR) 5 MG tablet Take 1 tablet (5 mg) by mouth daily 90 tablet 3     PARoxetine (PAXIL) 40 MG tablet Take 1 tablet (40 mg) by mouth daily 90 tablet 3     fluticasone (FLONASE) 50 MCG/ACT nasal spray Spray 2 sprays into both nostrils daily 16 g 5     minocycline (DYNACIN) 100 MG tablet Take 1 tablet (100 mg) by mouth 2 times daily 180 tablet 3       No Known Allergies    REVIEW OF SYSTEMS:  CONSTITUTIONAL:  NEGATIVE for fever, chills, change in weight  INTEGUMENTARY/SKIN:  NEGATIVE for worrisome rashes, moles or lesions  EYES:  NEGATIVE for vision changes or irritation  ENT/MOUTH:  NEGATIVE for ear, mouth and throat problems  RESP:  NEGATIVE for significant cough or SOB  BREAST:  NEGATIVE for masses, tenderness or discharge  CV:  NEGATIVE for chest pain, palpitations or peripheral edema  GI:  NEGATIVE for nausea, abdominal pain, heartburn, or change in bowel habits  :  Negative   MUSCULOSKELETAL:  See HPI above  NEURO:  Paresthesias (lower extremity)  ENDOCRINE:  NEGATIVE for temperature intolerance, skin/hair changes  HEME/ALLERGY/IMMUNE:  NEGATIVE for bleeding problems  PSYCHIATRIC:  NEGATIVE for changes in mood or affect      PHYSICAL EXAM:  /88 (BP Location: Right arm, Patient Position: Sitting, Cuff Size: Adult Regular)  Ht 5' 4\" (1.626 m)  Wt 195 lb (88.5 kg)  BMI 33.47 kg/m2  Body mass index is 33.47 kg/(m^2).   GENERAL APPEARANCE: healthy, alert and no distress   HEENT: No apparent thyroid megaly. Clear sclera with normal ocular movement  RESPIRATORY: No labored breathing  SKIN: no suspicious lesions or rashes  NEURO: Normal strength and tone, mentation intact and speech normal  VASCULAR: Good pulses, and capillary refill   LYMPH: no lymphadenopathy   PSYCH:  mentation appears normal and affect " normal/bright    MUSCULOSKELETAL:  Normal gait  No old neck movement  Symmetrical forward elevation and external rotation  However, internal rotation is limited compared to the left side (T8 versus T12)  Negative arm drop test  Positive impingement sign  Negative belly press  Full isometric strength in all directions  Negative apprehension test  Nontender biceps groove  Tender acromioclavicular joint, right     ASSESSMENT:  Chronic impingement syndrome with hypertrophy/bone spurs of the acromioclavicular joint, lateral downsloping acromion As well as type II acromion  No clinical suggestion of rotator cuff tear    PLAN:  We visualized the x-rays of the right shoulder taken today. Presence of significant inferiorly directed and bone spurs at the acromioclavicular joint was planned out. In addition, she does have lateral downsloping acromion as well as type II acromion Contributing to chronic impingement.  Observation versus cortisone injection versus surgical decompression were explained.  She is interested in having more definitive treatment for this and decided to go ahead with surgery with understanding of potential complications as well as possibility of persisting pain.  Without rotator cuff tear repair, she should be able to return to work after few days from the surgery.  Proposed surgical include right shoulder arthroscopy, decompression and distal resection.    Imaging Interpretation:   Three views of the right shoulder demonstrate significant bone spurs of the distal clavicle and medial acromion at the acromioclavicular joint, lateral down-sloping acromion as well as type II acromion. The glenohumeral joint space is well-maintained.The position of the humeral head in relationship to the glenoid is within normal limits. Otherwise no acute fractures or other osseous pathology noted.    Luca Rodríguez MD  Department of Orthopedic Surgery        Disclaimer: This note consists of symbols derived from keyboarding,  dictation and/or voice recognition software. As a result, there may be errors in the script that have gone undetected. Please consider this when interpreting information found in this chart.

## 2017-10-31 NOTE — LETTER
10/31/2017         RE: Hayde Monique  [address] [place] [address]        Dear Colleague,    Thank you for referring your patient, Hayde Monique, to the [facility]. Please see a copy of my visit note below.    HISTORY OF PRESENT ILLNESS:    Hayde Monique is a 47 year old female who is seen in consultation at the request of Dr. Arizmendi for right shoulder, upper arm pain.    Present symptoms: Pt states shoulder / arm pain has been present since April, 6 months ago.  Pt denies any known incident or injury to the shoulder.  Pt states around the time the pain started she had been playing softball with her daughter, feels work activity reaching and using the keyboard / mouse will increase pain in the arm as well.  Pt states pain is in the anterior shoulder and goes down the upper arm, describes pain aching, sharp pain with movement (extension).    Treatments tried to this point: stretching, ibuprofen  Orthopedic PMH: right lower leg - ORIF     Past Medical History:   Diagnosis Date     Anxiety attack      LSIL (low grade squamous intraepithelial lesion) on Pap smear 10/2010    2012 pap/hpv neg     OCD (obsessive compulsive disorder)        Past Surgical History:   Procedure Laterality Date     BREAST SURGERY      implants       SECTION       orif leg Right     right lower leg, rodding     WRIST SURGERY Left     Left wrist, cyst excision       Family History   Problem Relation Age of Onset     CANCER Mother 55     Lung cancer     Rheumatoid Arthritis Sister      Breast Cancer Maternal Grandmother      Rheumatoid Arthritis Paternal Grandmother        Social History     Social History     Marital status:      Spouse name: N/A     Number of children: N/A     Years of education: N/A     Occupational History     Not on file.     Social History Main Topics     Smoking status: Never Smoker     Smokeless tobacco: Never Used     Alcohol use 0.0 oz/week     0  "Standard drinks or equivalent per week      Comment: 4 drinks on the weekend..     Drug use: No     Sexual activity: Yes     Partners: Male     Other Topics Concern     Parent/Sibling W/ Cabg, Mi Or Angioplasty Before 65f 55m? No      Service No     Blood Transfusions No     Caffeine Concern Yes     2 pops a day      Self-Exams Yes     Social History Narrative       Current Outpatient Prescriptions   Medication Sig Dispense Refill     busPIRone (BUSPAR) 5 MG tablet Take 1 tablet (5 mg) by mouth daily 90 tablet 3     PARoxetine (PAXIL) 40 MG tablet Take 1 tablet (40 mg) by mouth daily 90 tablet 3     fluticasone (FLONASE) 50 MCG/ACT nasal spray Spray 2 sprays into both nostrils daily 16 g 5     minocycline (DYNACIN) 100 MG tablet Take 1 tablet (100 mg) by mouth 2 times daily 180 tablet 3       No Known Allergies    REVIEW OF SYSTEMS:  CONSTITUTIONAL:  NEGATIVE for fever, chills, change in weight  INTEGUMENTARY/SKIN:  NEGATIVE for worrisome rashes, moles or lesions  EYES:  NEGATIVE for vision changes or irritation  ENT/MOUTH:  NEGATIVE for ear, mouth and throat problems  RESP:  NEGATIVE for significant cough or SOB  BREAST:  NEGATIVE for masses, tenderness or discharge  CV:  NEGATIVE for chest pain, palpitations or peripheral edema  GI:  NEGATIVE for nausea, abdominal pain, heartburn, or change in bowel habits  :  Negative   MUSCULOSKELETAL:  See HPI above  NEURO:  Paresthesias (lower extremity)  ENDOCRINE:  NEGATIVE for temperature intolerance, skin/hair changes  HEME/ALLERGY/IMMUNE:  NEGATIVE for bleeding problems  PSYCHIATRIC:  NEGATIVE for changes in mood or affect      PHYSICAL EXAM:  /88 (BP Location: Right arm, Patient Position: Sitting, Cuff Size: Adult Regular)  Ht 5' 4\" (1.626 m)  Wt 195 lb (88.5 kg)  BMI 33.47 kg/m2  Body mass index is 33.47 kg/(m^2).   GENERAL APPEARANCE: healthy, alert and no distress   HEENT: No apparent thyroid megaly. Clear sclera with normal ocular " movement  RESPIRATORY: No labored breathing  SKIN: no suspicious lesions or rashes  NEURO: Normal strength and tone, mentation intact and speech normal  VASCULAR: Good pulses, and capillary refill   LYMPH: no lymphadenopathy   PSYCH:  mentation appears normal and affect normal/bright    MUSCULOSKELETAL:  Normal gait  No old neck movement  Symmetrical forward elevation and external rotation  However, internal rotation is limited compared to the left side (T8 versus T12)  Negative arm drop test  Positive impingement sign  Negative belly press  Full isometric strength in all directions  Negative apprehension test  Nontender biceps groove  Tender acromioclavicular joint, right     ASSESSMENT:  Chronic impingement syndrome with hypertrophy/bone spurs of the acromioclavicular joint, lateral downsloping acromion As well as type II acromion  No clinical suggestion of rotator cuff tear    PLAN:  We visualized the x-rays of the right shoulder taken today. Presence of significant inferiorly directed and bone spurs at the acromioclavicular joint was planned out. In addition, she does have lateral downsloping acromion as well as type II acromion Contributing to chronic impingement.  Observation versus cortisone injection versus surgical decompression were explained.  She is interested in having more definitive treatment for this and decided to go ahead with surgery with understanding of potential complications as well as possibility of persisting pain.  Without rotator cuff tear repair, she should be able to return to work after few days from the surgery.  Proposed surgical include right shoulder arthroscopy, decompression and distal resection.    Imaging Interpretation:   Three views of the right shoulder demonstrate significant bone spurs of the distal clavicle and medial acromion at the acromioclavicular joint, lateral down-sloping acromion as well as type II acromion. The glenohumeral joint space is well-maintained.The  position of the humeral head in relationship to the glenoid is within normal limits. Otherwise no acute fractures or other osseous pathology noted.    Luca Rodríguez MD  Department of Orthopedic Surgery        Disclaimer: This note consists of symbols derived from keyboarding, dictation and/or voice recognition software. As a result, there may be errors in the script that have gone undetected. Please consider this when interpreting information found in this chart.      Again, thank you for allowing me to participate in the care of your patient.        Sincerely,        Luca Rodríguez MD

## 2017-10-31 NOTE — TELEPHONE ENCOUNTER
Scheduled surgery for  Right shoulder arthroscopy, decompression, distal clavicle resection on 11/08/2017 with Dr Rodríguez @ [facility] @ 12:00.  Surgery education packet provided to patient.

## 2017-10-31 NOTE — MR AVS SNAPSHOT
"              After Visit Summary   10/31/2017    Hayde Monique    MRN: [id number]           Patient Information     Date Of Birth          1970        Visit Information        Provider Department      10/31/2017 8:00 AM Luca Rodríguez MD [facility]        Today's Diagnoses     Chronic right shoulder pain    -  1       Follow-ups after your visit        Who to contact     If you have questions or need follow up information about today's clinic visit or your schedule please contact [facility] directly at [phone].  Normal or non-critical lab and imaging results will be communicated to you by [employer], letter or phone within 4 business days after the clinic has received the results. If you do not hear from us within 7 days, please contact the clinic through [employer] or phone. If you have a critical or abnormal lab result, we will notify you by phone as soon as possible.  Submit refill requests through [employer] or call your pharmacy and they will forward the refill request to us. Please allow 3 business days for your refill to be completed.          Additional Information About Your Visit        MyChart Information     [employer] gives you secure access to your electronic health record. If you see a primary care provider, you can also send messages to your care team and make appointments. If you have questions, please call your primary care clinic.  If you do not have a primary care provider, please call [phone] and they will assist you.        Care EveryWhere ID     This is your Care EveryWhere ID. This could be used by other organizations to access your [facility] medical records  [id number]        Your Vitals Were     Height BMI (Body Mass Index)                5' 4\" (1.626 m) 33.47 kg/m2           Blood Pressure from Last 3 Encounters:   10/31/17 118/88   10/20/17 126/80   03/27/17 (!) 138/96    Weight from Last 3 Encounters:   10/31/17 195 lb (88.5 kg) "   10/20/17 195 lb (88.5 kg)   03/27/17 198 lb (89.8 kg)               Primary Care Provider Office Phone # Fax #    MARY Hansen [phone] [address] [place] [address]        Equal Access to Services     ANU FINCH : Hadii aad ku hadasho Soomaali, waaxda luqadaha, qaybta kaalmada adeegyada, waxay idiin hayaan adeeg kharash la'aan . So [facility] [phone].    ATENCIÓN: Si habla español, tiene a au disposición servicios gratuitos de asistencia lingüística. Llame al [phone].    We comply with applicable federal civil rights laws and [place] laws. We do not discriminate on the basis of race, color, national origin, age, disability, sex, sexual orientation, or gender identity.            Thank you!     Thank you for choosing [facility]  for your care. Our goal is always to provide you with excellent care. Hearing back from our patients is one way we can continue to improve our services. Please take a few minutes to complete the written survey that you may receive in the mail after your visit with us. Thank you!             Your Updated Medication List - Protect others around you: Learn how to safely use, store and throw away your medicines at www.disposemymeds.org.          This list is accurate as of: 10/31/17  8:47 AM.  Always use your most recent med list.                   Brand Name Dispense Instructions for use Diagnosis    busPIRone 5 MG tablet    BUSPAR    90 tablet    Take 1 tablet (5 mg) by mouth daily    Anxiety attack, Major depressive disorder, recurrent, severe without psychotic features (H), Generalized anxiety disorder       fluticasone 50 MCG/ACT spray    FLONASE    16 g    Spray 2 sprays into both nostrils daily    Seasonal allergic rhinitis       minocycline 100 MG tablet    DYNACIN    180 tablet    Take 1 tablet (100 mg) by mouth 2 times daily    Acne       PARoxetine 40 MG tablet    PAXIL    90 tablet    Take 1  tablet (40 mg) by mouth daily    OCD (obsessive compulsive disorder), Major depressive disorder, recurrent, severe without psychotic features (H), Generalized anxiety disorder

## 2017-10-31 NOTE — NURSING NOTE
"Chief Complaint   Patient presents with     Shoulder Pain     Right shoulder       Initial /88 (BP Location: Right arm, Patient Position: Sitting, Cuff Size: Adult Regular)  Ht 5' 4\" (1.626 m)  Wt 195 lb (88.5 kg)  BMI 33.47 kg/m2 Estimated body mass index is 33.47 kg/(m^2) as calculated from the following:    Height as of this encounter: 5' 4\" (1.626 m).    Weight as of this encounter: 195 lb (88.5 kg).  Medication Reconciliation: complete    "

## 2017-10-31 NOTE — TELEPHONE ENCOUNTER
PT orders pending for 11/08/2017 - Right shoulder arthroscopy, decompression, distal clavicle resection -   Please sign.

## 2017-11-03 ENCOUNTER — OFFICE VISIT (OUTPATIENT)
Dept: FAMILY MEDICINE | Facility: CLINIC | Age: 47
End: 2017-11-03
Payer: COMMERCIAL

## 2017-11-03 VITALS
WEIGHT: 192 LBS | OXYGEN SATURATION: 98 % | BODY MASS INDEX: 32.78 KG/M2 | DIASTOLIC BLOOD PRESSURE: 78 MMHG | SYSTOLIC BLOOD PRESSURE: 122 MMHG | TEMPERATURE: 98 F | HEART RATE: 88 BPM | HEIGHT: 64 IN

## 2017-11-03 DIAGNOSIS — G89.29 CHRONIC RIGHT SHOULDER PAIN: ICD-10-CM

## 2017-11-03 DIAGNOSIS — F41.1 GENERALIZED ANXIETY DISORDER: ICD-10-CM

## 2017-11-03 DIAGNOSIS — Z01.818 PREOP GENERAL PHYSICAL EXAM: Primary | ICD-10-CM

## 2017-11-03 DIAGNOSIS — E66.09 CLASS 1 OBESITY DUE TO EXCESS CALORIES WITHOUT SERIOUS COMORBIDITY WITH BODY MASS INDEX (BMI) OF 32.0 TO 32.9 IN ADULT: ICD-10-CM

## 2017-11-03 DIAGNOSIS — M75.81 BONE SPUR OF RIGHT ACROMIOCLAVICULAR JOINT: ICD-10-CM

## 2017-11-03 DIAGNOSIS — M25.511 CHRONIC RIGHT SHOULDER PAIN: ICD-10-CM

## 2017-11-03 DIAGNOSIS — E66.811 CLASS 1 OBESITY DUE TO EXCESS CALORIES WITHOUT SERIOUS COMORBIDITY WITH BODY MASS INDEX (BMI) OF 32.0 TO 32.9 IN ADULT: ICD-10-CM

## 2017-11-03 PROCEDURE — 99214 OFFICE O/P EST MOD 30 MIN: CPT | Performed by: PHYSICIAN ASSISTANT

## 2017-11-03 NOTE — PROGRESS NOTES
[facility]  [address]  [place] [address]  [phone]  Dept: [phone]    PRE-OP EVALUATION:  Today's date: 11/3/2017    Hayde Monique (: 1970) presents for pre-operative evaluation assessment as requested by Dr. Rodríguez.  She requires evaluation and anesthesia risk assessment prior to undergoing surgery/procedure for treatment of bone spurs.  Proposed procedure: Arthroscopic Distal Clavicle Resection    Date of Surgery/ Procedure: 2017   Time of Surgery/ Procedure: 12:00  Hospital/Surgical Facility: [facility]  Fax number for surgical facility:   Primary Physician: Daisy Almonte  Type of Anesthesia Anticipated: General    Patient has a Health Care Directive or Living Will:  NO    1. NO - Do you have a history of heart attack, stroke, stent, bypass or surgery on an artery in the head, neck, heart or legs?  2. NO - Do you ever have any pain or discomfort in your chest?  3. NO - Do you have a history of  Heart Failure?  4. NO - Are you troubled by shortness of breath when: walking on the level, up a slight hill or at night?  5. NO - Do you currently have a cold, bronchitis or other respiratory infection?  6. NO - Do you have a cough, shortness of breath or wheezing?  7. NO - Do you sometimes get pains in the calves of your legs when you walk?  8. NO - Do you or anyone in your family have previous history of blood clots?  9. NO - Do you or does anyone in your family have a serious bleeding problem such as prolonged bleeding following surgeries or cuts?  10. NO - Have you ever had problems with anemia or been told to take iron pills?  11. NO - Have you had any abnormal blood loss such as black, tarry or bloody stools, or abnormal vaginal bleeding?  12. NO - Have you ever had a blood transfusion?  13. NO - Have you or any of your relatives ever had problems with anesthesia?  14. NO - Do you have sleep apnea, excessive snoring or daytime drowsiness?  15.  NO - Do you have any prosthetic heart valves?  16. NO - Do you have prosthetic joints?  17. NO - Is there any chance that you may be pregnant?      HPI:                                                      Brief HPI related to upcoming procedure: Hayde is a 48 yo female here today for pre-op. Thought she might have injured, but then didn't improve. Was referred to ortho for further evaluation as was found on x-ray to have bone spurs of AC joint that was contributing to impingement syndrome. She is now pursuing arthroscopic decompression and distal clavicle resection.     LMP 3 weeks ago - denies possibility of pregnancy.     See problem list for active medical problems.  Problems all longstanding and stable, except as noted/documented.  See ROS for pertinent symptoms related to these conditions.                                                                                                      MEDICAL HISTORY:                                                    Patient Active Problem List    Diagnosis Date Noted     Class 1 obesity due to excess calories without serious comorbidity with body mass index (BMI) of 32.0 to 32.9 in adult 11/03/2017     Priority: Medium     Family history of breast cancer 03/27/2017     Priority: Medium     Maternal grandmother       Melasma 11/01/2015     Priority: Medium     Generalized anxiety disorder 05/19/2015     Priority: Medium     Diagnosis updated by automated process. Provider to review and confirm.       Major depression 01/16/2014     Priority: Medium     OCD (obsessive compulsive disorder)      Priority: Medium     Anxiety attack      Priority: Medium     CARDIOVASCULAR SCREENING; LDL GOAL LESS THAN 160 01/03/2013     Priority: Medium      Past Medical History:   Diagnosis Date     Anxiety attack      Depressive disorder 2000     Dermatofibroma 11/1/2015     Family history of breast cancer      LSIL (low grade squamous intraepithelial lesion) on Pap smear 10/2010    1/2012  pap/hpv neg     OCD (obsessive compulsive disorder)      Past Surgical History:   Procedure Laterality Date     ABDOMEN SURGERY           BREAST SURGERY      implants       SECTION       COSMETIC SURGERY      Implants     ENT SURGERY      Tubes     orif leg Right     right lower leg, rodding     ORTHOPEDIC SURGERY      Broken Leg - chin and screws     WRIST SURGERY Left     Left wrist, cyst excision     Current Outpatient Prescriptions   Medication Sig Dispense Refill     busPIRone (BUSPAR) 5 MG tablet Take 1 tablet (5 mg) by mouth daily 90 tablet 3     PARoxetine (PAXIL) 40 MG tablet Take 1 tablet (40 mg) by mouth daily 90 tablet 3     fluticasone (FLONASE) 50 MCG/ACT nasal spray Spray 2 sprays into both nostrils daily 16 g 5     minocycline (DYNACIN) 100 MG tablet Take 1 tablet (100 mg) by mouth 2 times daily 180 tablet 3     OTC products: ibuprofen taken last today - advised to discontinue    No Known Allergies   Latex Allergy: NO    Social History   Substance Use Topics     Smoking status: Never Smoker     Smokeless tobacco: Never Used     Alcohol use 0.0 oz/week      Comment: three times per week about 2 glasses of wine     History   Drug Use No       REVIEW OF SYSTEMS:                                                    C: NEGATIVE for fever, chills, change in weight  I: NEGATIVE for worrisome rashes, moles or lesions  E: NEGATIVE for vision changes or irritation  E/M: NEGATIVE for ear, mouth and throat problems  R: NEGATIVE for significant  cough or SOB  CV: NEGATIVE for chest pain, palpitations or peripheral edema  GI: NEGATIVE for nausea, abdominal pain, heartburn, or change in bowel habits  : NEGATIVE for frequency, dysuria, or hematuria  MUSCULOSKELETAL:POSITIVE  for R shoulder pain.  N: NEGATIVE for weakness, dizziness or paresthesias  E: NEGATIVE for temperature intolerance, skin/hair changes  H: NEGATIVE for bleeding problems  P: NEGATIVE for changes in mood or  "affect    EXAM:                                                    /78  Pulse 88  Temp 98  F (36.7  C) (Oral)  Ht 5' 4\" (1.626 m)  Wt 192 lb (87.1 kg)  SpO2 98%  Breastfeeding? No  BMI 32.96 kg/m2    GENERAL APPEARANCE: healthy, alert and no distress     EYES: EOMI, PERRL     HENT: ear canals and TM's normal and nose and mouth without ulcers or lesions     NECK: no adenopathy, no asymmetry, masses, or scars and thyroid normal to palpation     RESP: lungs clear to auscultation - no rales, rhonchi or wheezes     CV: regular rates and rhythm, normal S1 S2, no S3 or S4 and no murmur, click or rub     ABDOMEN:  soft, nontender, no HSM or masses and bowel sounds normal     MS: extremities normal- no gross deformities noted, no evidence of inflammation in joints.     SKIN: no suspicious lesions or rashes     NEURO: Normal strength and tone, sensory exam grossly normal, mentation intact and speech normal     PSYCH: mentation appears normal. and affect normal/bright     LYMPHATICS: No axillary, cervical, or supraclavicular nodes    DIAGNOSTICS:                                                    EKG: Not indicated due to non-vascular surgery and low risk of event (age <65 and without cardiac risk factors)    Recent Labs   Lab Test  12/21/16   1630  10/08/14   1115  04/02/13   1212   HGB  12.9   --   12.4   PLT  272   --   271   NA  140  140  140   POTASSIUM  4.0  4.6  4.4   CR  0.60  0.62  0.64      IMPRESSION:                                                    Reason for surgery/procedure: Arthroscopic Distal Clavicle Resection  Diagnosis/reason for consult: pre-op, chronic R shoulder pain, obesity, depression/anxiety    The proposed surgical procedure is considered INTERMEDIATE risk.    REVISED CARDIAC RISK INDEX  The patient has the following serious cardiovascular risks for perioperative complications such as (MI, PE, VFib and 3  AV Block):  No serious cardiac risks  INTERPRETATION: 0 risks: Class I (very low " risk - 0.4% complication rate)    The patient has the following additional risks for perioperative complications:  No identified additional risks      ICD-10-CM    1. Preop general physical exam Z01.818    2. Chronic right shoulder pain M25.511     G89.29    3. Bone spur of right acromioclavicular joint M75.81    4. Class 1 obesity due to excess calories without serious comorbidity with body mass index (BMI) of 32.0 to 32.9 in adult E66.09     Z68.32    5. Generalized anxiety disorder F41.1        RECOMMENDATIONS:                                                      --Patient is to take all scheduled medications on the day of surgery EXCEPT for modifications listed below.    Anticoagulant or Antiplatelet Medication Use  NSAIDS: Ibuprofen (Motrin):         Stop one day prior to surgery      APPROVAL GIVEN to proceed with proposed procedure, without further diagnostic evaluation       Signed Electronically by: Ludy Sepulveda PA-C    Copy of this evaluation report is provided to requesting physician.    [place] Preop Guidelines

## 2017-11-03 NOTE — MR AVS SNAPSHOT
After Visit Summary   11/3/2017    Hayde Monique    MRN: [id number]           Patient Information     Date Of Birth          1970        Visit Information        Provider Department      11/3/2017 4:20 PM Ludy Sepulveda PA-C [facility] [place]        Today's Diagnoses     Preop general physical exam    -  1    Chronic right shoulder pain        Bone spur of right acromioclavicular joint        Class 1 obesity due to excess calories without serious comorbidity with body mass index (BMI) of 32.0 to 32.9 in adult        Generalized anxiety disorder          Care Instructions      Before Your Surgery      Call your surgeon if there is any change in your health. This includes signs of a cold or flu (such as a sore throat, runny nose, cough, rash or fever).    Do not smoke, drink alcohol or take over the counter medicine (unless your surgeon or primary care doctor tells you to) for the 24 hours before and after surgery.    If you take prescribed drugs: Follow your doctor s orders about which medicines to take and which to stop until after surgery.    Eating and drinking prior to surgery: follow the instructions from your surgeon    Take a shower or bath the night before surgery. Use the soap your surgeon gave you to gently clean your skin. If you do not have soap from your surgeon, use your regular soap. Do not shave or scrub the surgery site.  Wear clean pajamas and have clean sheets on your bed.           Follow-ups after your visit        Your next 10 appointments already scheduled     Nov 13, 2017  2:40 PM CST   Return Visit with Agustin Paz PA-C   [facility]THOPEDIC SURGERY ([facility])    [address]  [address] [place] [address]   [phone]            Nov 13, 2017  3:50 PM CST   (Arrive by 3:35 PM)   GREG Extremity with Danis GARZA [facility] ([facility]  )    [address]  [address]  [facility] [place] [address]  "  [phone]              Who to contact     If you have questions or need follow up information about today's clinic visit or your schedule please contact [facility] [place] directly at [phone].  Normal or non-critical lab and imaging results will be communicated to you by MyChart, letter or phone within 4 business days after the clinic has received the results. If you do not hear from us within 7 days, please contact the clinic through MyChart or phone. If you have a critical or abnormal lab result, we will notify you by phone as soon as possible.  Submit refill requests through ITN or call your pharmacy and they will forward the refill request to us. Please allow 3 business days for your refill to be completed.          Additional Information About Your Visit        [facility] Information     ITN gives you secure access to your electronic health record. If you see a primary care provider, you can also send messages to your care team and make appointments. If you have questions, please call your primary care clinic.  If you do not have a primary care provider, please call [phone] and they will assist you.        Care EveryWhere ID     This is your Care EveryWhere ID. This could be used by other organizations to access your [facility] medical records  [id number]        Your Vitals Were     Pulse Temperature Height Pulse Oximetry Breastfeeding? BMI (Body Mass Index)    88 98  F (36.7  C) (Oral) 5' 4\" (1.626 m) 98% No 32.96 kg/m2       Blood Pressure from Last 3 Encounters:   11/03/17 122/78   10/31/17 118/88   10/20/17 126/80    Weight from Last 3 Encounters:   11/03/17 192 lb (87.1 kg)   10/31/17 195 lb (88.5 kg)   10/20/17 195 lb (88.5 kg)              Today, you had the following     No orders found for display       Primary Care Provider Office Phone # Fax #    MARY Hansen [facility] [phone] [address] [place] [address]        Equal Access " to Services     ANU FINCH : Hadii waldemar Smith, wachristianoda annabelqadaha, qaybta kaalmaamy smallwood. So [facility] [phone].    ATENCIÓN: Si eliciala josue, tiene a au disposición servicios gratuitos de asistencia lingüística. Llame al [phone].    We comply with applicable federal civil rights laws and [place] laws. We do not discriminate on the basis of race, color, national origin, age, disability, sex, sexual orientation, or gender identity.            Thank you!     Thank you for choosing [facility]  [facility] your [facility]. Our goal is always to provide you with excellent care. Hearing back from our patients is one way we can continue to improve our services. Please take a few minutes to complete the written survey that you may receive in the mail after your visit with us. Thank you!             Your Updated Medication List - Protect others around you: Learn how to safely use, store and throw away your medicines at www.disposemymeds.org.          This list is accurate as of: 11/3/17 11:59 PM.  Always use your most recent med list.                   Brand Name Dispense Instructions for use Diagnosis    busPIRone 5 MG tablet    BUSPAR    90 tablet    Take 1 tablet (5 mg) by mouth daily    Anxiety attack, Major depressive disorder, recurrent, severe without psychotic features (H), Generalized anxiety disorder       fluticasone 50 MCG/ACT spray    FLONASE    16 g    Spray 2 sprays into both nostrils daily    Seasonal allergic rhinitis       minocycline 100 MG tablet    DYNACIN    180 tablet    Take 1 tablet (100 mg) by mouth 2 times daily    Acne       PARoxetine 40 MG tablet    PAXIL    90 tablet    Take 1 tablet (40 mg) by mouth daily    OCD (obsessive compulsive disorder), Major depressive disorder, recurrent, severe without psychotic features (H), Generalized anxiety disorder

## 2017-11-08 ENCOUNTER — TRANSFERRED RECORDS (OUTPATIENT)
Dept: HEALTH INFORMATION MANAGEMENT | Facility: CLINIC | Age: 47
End: 2017-11-08

## 2017-11-13 ENCOUNTER — TELEPHONE (OUTPATIENT)
Dept: ORTHOPEDICS | Facility: CLINIC | Age: 47
End: 2017-11-13

## 2017-11-13 ENCOUNTER — OFFICE VISIT (OUTPATIENT)
Dept: ORTHOPEDICS | Facility: CLINIC | Age: 47
End: 2017-11-13
Payer: COMMERCIAL

## 2017-11-13 ENCOUNTER — THERAPY VISIT (OUTPATIENT)
Dept: PHYSICAL THERAPY | Facility: CLINIC | Age: 47
End: 2017-11-13
Payer: COMMERCIAL

## 2017-11-13 DIAGNOSIS — Z47.89 AFTERCARE FOLLOWING SURGERY OF THE MUSCULOSKELETAL SYSTEM: ICD-10-CM

## 2017-11-13 DIAGNOSIS — M25.511 RIGHT SHOULDER PAIN: Primary | ICD-10-CM

## 2017-11-13 DIAGNOSIS — Z47.89 ORTHOPEDIC AFTERCARE: Primary | ICD-10-CM

## 2017-11-13 PROCEDURE — 97110 THERAPEUTIC EXERCISES: CPT | Mod: GP | Performed by: PHYSICAL THERAPIST

## 2017-11-13 PROCEDURE — 97161 PT EVAL LOW COMPLEX 20 MIN: CPT | Mod: GP | Performed by: PHYSICAL THERAPIST

## 2017-11-13 PROCEDURE — 99024 POSTOP FOLLOW-UP VISIT: CPT | Performed by: PHYSICIAN ASSISTANT

## 2017-11-13 NOTE — TELEPHONE ENCOUNTER
Patient stopped in asking if paperwork has been completed after physical therapy appointment.   She forgot to ask at her appointment today with Sanjiv.   Dropped off forms last week but no encounter was started. States she needs them completed or a letter stating she can return to work tomorrow.     Our goal is to complete and return forms within 5-7 business days of receiving the request.    Type of form Requested: FMLA  Form requested by (include company):   [place] [employer]    Phone number for requestor: Brie Prasanth:[phone]  Date form is requested by: asaalexis    How will form be returned?:  fax to : Brie Rader, Dept of Human Services: [phone] AND Mail copy to patient.   Has the patient signed a consent form for release of information (may be included with form)? N/A    Form was started and placed on Sanjiv' desk.     Patient had Right shoulder arthroscopy, decompression, distal clavicle resection on 11/08/2017 with Dr Rodríguez.     Routing to Sanjiv Paz PA-C.      informed patient we will have Sanjiv Paz PA-C address tomorrow when he returns to the office.   Patient states she can't return to work without letter or from saying it is ok to do so.   Will try to get completed as soon as possible in the morning.   Please call patient when done: [phone].    JUDITH Roman RN

## 2017-11-13 NOTE — PROGRESS NOTES
Subjective:    HPI                    Objective:    System    Physical Exam    General     [facility]     Physical Therapy Initial Evaluation:   Nov 13, 2017  Precautions/Restrictions/MD instructions:   Per Patient: No restrictions were conveyed. States that her work needs a note to return to work.     Subjective:   Chief Complaint: S/P Right shoulder arthroscopy - Decompression, Distal clavicle resection   Pain: Throughout the right shoulder region (top and front). Neck pain on the right side.    Numbness/Tingling: None   Weakness: None   Stiffness: In the right shoulder   DOS: 11/8/2017  Referral Date: 10/31/2017  PMH/surgical history/trauma:    - Depression   - Hx of fracture with chin stabilization in the right leg (1995)  General health as reported by patient: Good    Medications: Anti-depressants  Occupation:  Job duties: prolonged sitting, repetitive tasks, computer work  AM/PM: no difference  Quality of Pain: dull  Pain: 4/10 at present, 0/10 at best, 8/10 at worst  Better: None found so far.   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 Can't reach behind her back.  ; lifting - has not tried to lift with it  Previous Functional Status: no restrictions with doing her hair or reaching. Could carry all the groceries after shopping.   Current HEP/exercise regimen: walking (01151 steps per day)  Hand/Leg Dominance: right handed  Transportation: no issues with transportation  Live with Others:  and kids at home to help as needed  Red Flags:   - Patient denies the following: Fever ; Weakness ; Numbness/Tingling ;     Patient's Goal(s): Get exercises to do at home  and get back up to speed.         Objective:    Bed Transfers: no restrictions    Skin/Surgical Site: slightly warmer, no discoloration, no signs of infection    ROM:   R L   SHOULDER     Flex 90 166   Abd 100 166   ER 48 86   IR R buttock T10         Assessment/Plan:      Patient is a 47 year old female with right side shoulder complaints.    Patient has the following significant findings with corresponding treatment plan.                Diagnosis 1:  S/P right shoulder arthroscopy - decompression, distal clavicle resection    Pain -  hot/cold therapy, manual therapy, splint/taping/bracing/orthotics, self management, education and home program  Decreased ROM/flexibility - manual therapy, therapeutic exercise, therapeutic activity and home program  Decreased strength - therapeutic exercise, therapeutic activities and home program  Decreased function - therapeutic activities and home program  Impaired posture - neuro re-education, therapeutic activities and home program    Therapy Evaluation Codes:   1) History comprised of:   Personal factors that impact the plan of care:      None.    Comorbidity factors that impact the plan of care are:      Depression.     Medications impacting care: Anti-depressant.  2) Examination of Body Systems comprised of:   Body structures and functions that impact the plan of care:      Shoulder.   Activity limitations that impact the plan of care are:      Dressing, Lifting and Reaching.  3) Clinical presentation characteristics are:   Stable/Uncomplicated.  4) Decision-Making    Low complexity using standardized patient assessment instrument and/or measureable assessment of functional outcome.  Cumulative Therapy Evaluation is: Low complexity.    Previous and current functional limitations:  (See Goal Flow Sheet for this information)    Short term and Long term goals: (See Goal Flow Sheet for this information)     Communication ability:  Patient appears to be able to clearly communicate  and understand verbal and written communication and follow directions correctly.  Treatment Explanation - The following has been discussed with the patient:   RX ordered/plan of care  Anticipated outcomes  Possible risks and side effects  This patient would benefit from PT intervention to resume normal activities.   Rehab potential is good.    Frequency:  1 X week, once daily  Duration:  for 4 weeks tapering to 2 X a month over 8 weeks  Discharge Plan:  Achieve all LTG.  Independent in home treatment program.  Reach maximal therapeutic benefit.    Please refer to the daily flowsheet for treatment today, total treatment time and time spent performing 1:1 timed codes.

## 2017-11-13 NOTE — LETTER
[facility]  [address]  [facility] [address]  [phone]          November 14, 2017    RE:  Hayde Monique                                                                                                                                                       [address] [place] [address]      To whom it may concern:    Hayde Monique is under my professional care for Right shoulder surgery.       Based on the description given verbally by the patient, she  may return to work on 11/13/2017 without restrictions.      Sincerely,        Agustin Paz PA-C  [facility] - Surgery

## 2017-11-14 ENCOUNTER — MYC MEDICAL ADVICE (OUTPATIENT)
Dept: ORTHOPEDICS | Facility: CLINIC | Age: 47
End: 2017-11-14

## 2017-11-14 NOTE — TELEPHONE ENCOUNTER
Patient called requesting a letter to return to work as well as the FMLA forms. See below.     Please advise.     Patient requests a call back when ready.

## 2017-11-14 NOTE — TELEPHONE ENCOUNTER
Forms and letter faxed as requested, pt notified and copy mailed to patient.  Copy sent for scanning.

## 2017-11-14 NOTE — TELEPHONE ENCOUNTER
FMLA completed and letter composed.  Left with triage.  Agustin Paz PA-C  [facility] - Surgery

## 2017-11-16 NOTE — PROGRESS NOTES
HISTORY OF PRESENT ILLNESS:    Hayde Monique is a 47 year old female who is seen in follow up for R Shoulder arthroscopy DEC.DCR, DOS 11/08/17, Dr. Rodríguez.  Present symptoms: Pt reports improving pain.  Tapered sling.  Using less pain medication.  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  PT to start. No new complaints.   Denies Chest pain, Calve pain, Fever, Chills.    Current Treatment: postop.    PHYSICAL EXAM:  There were no vitals taken for this visit.  There is no height or weight on file to calculate BMI.   GENERAL APPEARANCE: healthy, alert and no distress   PSYCH:  mentation appears normal and affect normal/bright    MSK:  Right: Shoulder .  Ambulates: WNG.  Incisions clean and dry, no skin closure present, healing.  Appropriate incisional erythema.   Yes Ecchymosis mild resolving.  Edema min  CMS: kristal incisional numbness, otherwise grossly intact to hand..  AROM Flexion 70.      IMAGING INTERPRETATION:  None today.     ASSESSMENT:  Hayde Monique is a 47 year old female S/P R Shoulder arthroscopy DEC.DCR, DOS 11/08/17, Dr. Rodríguez.    Doing well.    PLAN:  - Surgery discussed, images reviewed if applicable, and all questions were answered at this time.  - Care instructions given and verbally acknowledged.  - Medications: PRN.  - Physical therapy: continue with current physical therapy, progress as tolerated.  - AAT    Return to clinic 6, weeks    Agustin Paz PA-C    Dept. Orthopedic Surgery  [facility]   11/15/2017

## 2017-11-16 NOTE — PATIENT INSTRUCTIONS
Gradually increase your activities as you can tolerated them, starting at a level well below what you would normally do.     Use good form for all activities as discussed.  Continue with physical therapy.    Follow up in 6 weeks.

## 2017-11-28 DIAGNOSIS — F33.2 MAJOR DEPRESSIVE DISORDER, RECURRENT, SEVERE WITHOUT PSYCHOTIC FEATURES (H): ICD-10-CM

## 2017-11-28 DIAGNOSIS — F41.1 GENERALIZED ANXIETY DISORDER: ICD-10-CM

## 2017-11-30 RX ORDER — PAROXETINE 40 MG/1
40 TABLET, FILM COATED ORAL DAILY
Qty: 90 TABLET | Refills: 1 | Status: SHIPPED | OUTPATIENT
Start: 2017-11-30 | End: 2018-05-14

## 2017-11-30 NOTE — TELEPHONE ENCOUNTER
Prescription approved per [facility] Refill Protocol.  Laura Mathias, RN, BSN  [facility]

## 2017-12-01 ENCOUNTER — THERAPY VISIT (OUTPATIENT)
Dept: PHYSICAL THERAPY | Facility: CLINIC | Age: 47
End: 2017-12-01
Payer: COMMERCIAL

## 2017-12-01 DIAGNOSIS — Z47.89 AFTERCARE FOLLOWING SURGERY OF THE MUSCULOSKELETAL SYSTEM: Primary | ICD-10-CM

## 2017-12-01 DIAGNOSIS — M25.511 RIGHT SHOULDER PAIN: ICD-10-CM

## 2017-12-01 PROCEDURE — 97110 THERAPEUTIC EXERCISES: CPT | Mod: GP | Performed by: PHYSICAL THERAPIST

## 2017-12-01 PROCEDURE — 97112 NEUROMUSCULAR REEDUCATION: CPT | Mod: GP | Performed by: PHYSICAL THERAPIST

## 2017-12-17 ENCOUNTER — HEALTH MAINTENANCE LETTER (OUTPATIENT)
Age: 47
End: 2017-12-17

## 2018-01-30 ENCOUNTER — MYC MEDICAL ADVICE (OUTPATIENT)
Dept: FAMILY MEDICINE | Facility: CLINIC | Age: 48
End: 2018-01-30

## 2018-01-30 NOTE — TELEPHONE ENCOUNTER
Please see [employer] message. Please advise. Thank you.  Larua Mathias RN, BSN  [facility]

## 2018-03-24 NOTE — NURSING NOTE
"Chief Complaint   Patient presents with     Pre-Op Exam       Initial Pulse 88  Temp 98  F (36.7  C) (Oral)  Ht 5' 4\" (1.626 m)  Wt 192 lb (87.1 kg)  SpO2 98%  Breastfeeding? No  BMI 32.96 kg/m2 Estimated body mass index is 32.96 kg/(m^2) as calculated from the following:    Height as of this encounter: 5' 4\" (1.626 m).    Weight as of this encounter: 192 lb (87.1 kg).  Medication Reconciliation: complete    " Yes

## 2018-04-10 ENCOUNTER — MYC MEDICAL ADVICE (OUTPATIENT)
Dept: FAMILY MEDICINE | Facility: CLINIC | Age: 48
End: 2018-04-10

## 2018-04-10 NOTE — TELEPHONE ENCOUNTER
Please see [employer] message. Routing to clinic TC to see if we can assist patient in accessing children's [employer] accounts. Thank you.  Laura Mathias RN, BSN  [facility]

## 2018-04-10 NOTE — TELEPHONE ENCOUNTER
Proxy accounts fixed as correct forms are scanned in to charts.  Entered note in son's account and responded back to mom.  Nuvia Huffman

## 2018-04-30 ENCOUNTER — TELEPHONE (OUTPATIENT)
Dept: FAMILY MEDICINE | Facility: CLINIC | Age: 48
End: 2018-04-30

## 2018-04-30 NOTE — TELEPHONE ENCOUNTER
Reason for Call: Request for an order or referral:    Order or referral being requested: ENT     Date needed: as soon as possible    Has the patient been seen by the PCP for this problem? NO    Additional comments: Patient states she has has ringing in her ear.     Phone number Patient can be reached at:  Home number on file [phone] (home)    Best Time:  Anytime     Can we leave a detailed message on this number?  YES    Call taken on 4/30/2018 at 8:14 AM by Nina Beckett

## 2018-04-30 NOTE — TELEPHONE ENCOUNTER
Called patient gave her the number for [facility]. [place] location. No referral is needed.    Hortencia Mcfarlane  Referral Coordinator

## 2018-05-14 ENCOUNTER — MYC REFILL (OUTPATIENT)
Dept: FAMILY MEDICINE | Facility: CLINIC | Age: 48
End: 2018-05-14

## 2018-05-14 ENCOUNTER — MYC MEDICAL ADVICE (OUTPATIENT)
Dept: FAMILY MEDICINE | Facility: CLINIC | Age: 48
End: 2018-05-14

## 2018-05-14 DIAGNOSIS — F41.1 GENERALIZED ANXIETY DISORDER: ICD-10-CM

## 2018-05-14 DIAGNOSIS — J30.2 SEASONAL ALLERGIC RHINITIS: ICD-10-CM

## 2018-05-14 DIAGNOSIS — F33.2 MAJOR DEPRESSIVE DISORDER, RECURRENT, SEVERE WITHOUT PSYCHOTIC FEATURES (H): ICD-10-CM

## 2018-05-14 RX ORDER — FLUTICASONE PROPIONATE 50 MCG
2 SPRAY, SUSPENSION (ML) NASAL DAILY
Qty: 16 G | Refills: 5 | Status: CANCELLED | OUTPATIENT
Start: 2018-05-14

## 2018-05-14 NOTE — TELEPHONE ENCOUNTER
"Requested Prescriptions   Pending Prescriptions Disp Refills     PARoxetine (PAXIL) 40 MG tablet  Last Written Prescription Date:  11/30/2017  Last Fill Quantity: 90 tablet,  # refills: 1   Last office visit: 11/3/2017 with prescribing provider:  Derick   Future Office Visit:       90 tablet 1     Sig: Take 1 tablet (40 mg) by mouth daily    SSRIs Protocol Failed    5/14/2018  2:59 PM       Failed - PHQ-9 score less than 5 in past 6 months    Please review last PHQ-9 score.   PHQ-9 SCORE 12/21/2016 12/21/2016 10/20/2017   Total Score - - -   Total Score MyChart - - -   Total Score 8 8 0     CARLOS-7 SCORE 12/21/2016 12/21/2016 10/20/2017   Total Score - - -   Total Score 15 15 6            Failed - Recent (6 mo) or future (30 days) visit within the authorizing provider's specialty    Patient had office visit in the last 6 months or has a visit in the next 30 days with authorizing provider or within the authorizing provider's specialty.  See \"Patient Info\" tab in inbasket, or \"Choose Columns\" in Meds & Orders section of the refill encounter.           Passed - Patient is age 18 or older       Passed - No active pregnancy on record       Passed - No positive pregnancy test in last 12 months          "

## 2018-05-14 NOTE — TELEPHONE ENCOUNTER
Message from [employer]:  Original authorizing provider: JEANNINE Hugo would like a refill of the following medications:  PARoxetine (PAXIL) 40 MG tablet [Ludy Sepulveda PA-C]    Preferred pharmacy: [facility] [address] [place]  [place] - [address]    Comment:  Hello. I would like to get a renewal of fluticasone. I had bottles left over from before, but I have used them all. I use them for sinus infections/allergies. I would like to not have to come in to get these medications refilled. Thank you Hayde Tavares [phone] [facility] [place] is my new pharmacy. Thanks    Medication renewals requested in this message routed to other providers:  minocycline (DYNACIN) 100 MG tablet [MARY Hart CNP]  fluticasone (FLONASE) 50 MCG/ACT nasal spray [MARY Dias CNP]

## 2018-05-14 NOTE — TELEPHONE ENCOUNTER
Message from [employer]:  Original authorizing provider: MARY Dias CNP would like a refill of the following medications:  fluticasone (FLONASE) 50 MCG/ACT nasal spray [MARY Dias CNP]    Preferred pharmacy: [facility] [address]  [place], [place] - [address]    Comment:  Hello. I would like to get a renewal of fluticasone. I had bottles left over from before, but I have used them all. I use them for sinus infections/allergies. I would like to not have to come in to get these medications refilled. Thank you Hayde Tavares [phone] [facility] [place] is my new pharmacy. Thanks    Medication renewals requested in this message routed to other providers:  minocycline (DYNACIN) 100 MG tablet [MARY Hart CNP]  PARoxetine (PAXIL) 40 MG tablet [Ludy Sepulveda PA-C]

## 2018-05-14 NOTE — TELEPHONE ENCOUNTER
"Requested Prescriptions   Pending Prescriptions Disp Refills     fluticasone (FLONASE) 50 MCG/ACT spray  Last Written Prescription Date:  11/6/2015  Last Fill Quantity: 16 g,  # refills: 5   Last office visit: 11/3/2017 with prescribing provider:  Derick   Future Office Visit:       16 g 5     Sig: Spray 2 sprays into both nostrils daily    Inhaled Steroids Protocol Passed    5/14/2018  3:58 PM       Passed - Patient is age 12 or older       Passed - Recent (12 mo) or future (30 days) visit within the authorizing provider's specialty    Patient had office visit in the last 12 months or has a visit in the next 30 days with authorizing provider or within the authorizing provider's specialty.  See \"Patient Info\" tab in inbasket, or \"Choose Columns\" in Meds & Orders section of the refill encounter.              "

## 2018-05-15 RX ORDER — FLUTICASONE PROPIONATE 50 MCG
2 SPRAY, SUSPENSION (ML) NASAL DAILY
Qty: 48 G | Refills: 1 | Status: SHIPPED | OUTPATIENT
Start: 2018-05-15 | End: 2019-11-04

## 2018-05-15 RX ORDER — PAROXETINE 40 MG/1
40 TABLET, FILM COATED ORAL DAILY
Qty: 30 TABLET | Refills: 0 | Status: SHIPPED | OUTPATIENT
Start: 2018-05-15 | End: 2018-06-20

## 2018-05-15 NOTE — TELEPHONE ENCOUNTER
Prescription approved per [facility] Refill Protocol.  Gladys Domnigo RN Triage  [facility]

## 2018-06-20 ENCOUNTER — OFFICE VISIT (OUTPATIENT)
Dept: FAMILY MEDICINE | Facility: CLINIC | Age: 48
End: 2018-06-20
Payer: COMMERCIAL

## 2018-06-20 VITALS
WEIGHT: 202 LBS | HEIGHT: 64 IN | BODY MASS INDEX: 34.49 KG/M2 | DIASTOLIC BLOOD PRESSURE: 88 MMHG | TEMPERATURE: 98.5 F | OXYGEN SATURATION: 96 % | SYSTOLIC BLOOD PRESSURE: 118 MMHG | HEART RATE: 106 BPM

## 2018-06-20 DIAGNOSIS — F33.2 MAJOR DEPRESSIVE DISORDER, RECURRENT, SEVERE WITHOUT PSYCHOTIC FEATURES (H): Primary | ICD-10-CM

## 2018-06-20 DIAGNOSIS — Z12.31 VISIT FOR SCREENING MAMMOGRAM: ICD-10-CM

## 2018-06-20 DIAGNOSIS — N93.8 DYSFUNCTIONAL UTERINE BLEEDING: ICD-10-CM

## 2018-06-20 DIAGNOSIS — M71.22 BAKER CYST, LEFT: ICD-10-CM

## 2018-06-20 DIAGNOSIS — F41.1 GENERALIZED ANXIETY DISORDER: ICD-10-CM

## 2018-06-20 LAB
ALBUMIN SERPL-MCNC: 3.9 G/DL (ref 3.4–5)
ALP SERPL-CCNC: 94 U/L (ref 40–150)
ALT SERPL W P-5'-P-CCNC: 39 U/L (ref 0–50)
ANION GAP SERPL CALCULATED.3IONS-SCNC: 13 MMOL/L (ref 3–14)
AST SERPL W P-5'-P-CCNC: 21 U/L (ref 0–45)
BASOPHILS # BLD AUTO: 0 10E9/L (ref 0–0.2)
BASOPHILS NFR BLD AUTO: 0.5 %
BILIRUB SERPL-MCNC: 0.2 MG/DL (ref 0.2–1.3)
BUN SERPL-MCNC: 15 MG/DL (ref 7–30)
CALCIUM SERPL-MCNC: 8.8 MG/DL (ref 8.5–10.1)
CHLORIDE SERPL-SCNC: 111 MMOL/L (ref 94–109)
CHOLEST SERPL-MCNC: 294 MG/DL
CO2 SERPL-SCNC: 22 MMOL/L (ref 20–32)
CREAT SERPL-MCNC: 0.54 MG/DL (ref 0.52–1.04)
DIFFERENTIAL METHOD BLD: NORMAL
EOSINOPHIL # BLD AUTO: 0.1 10E9/L (ref 0–0.7)
EOSINOPHIL NFR BLD AUTO: 1.1 %
ERYTHROCYTE [DISTWIDTH] IN BLOOD BY AUTOMATED COUNT: 13.2 % (ref 10–15)
GFR SERPL CREATININE-BSD FRML MDRD: >90 ML/MIN/1.7M2
GLUCOSE SERPL-MCNC: 81 MG/DL (ref 70–99)
HCT VFR BLD AUTO: 37.1 % (ref 35–47)
HDLC SERPL-MCNC: 48 MG/DL
HGB BLD-MCNC: 12 G/DL (ref 11.7–15.7)
LDLC SERPL CALC-MCNC: 167 MG/DL
LYMPHOCYTES # BLD AUTO: 1.4 10E9/L (ref 0.8–5.3)
LYMPHOCYTES NFR BLD AUTO: 23.2 %
MCH RBC QN AUTO: 30.9 PG (ref 26.5–33)
MCHC RBC AUTO-ENTMCNC: 32.3 G/DL (ref 31.5–36.5)
MCV RBC AUTO: 96 FL (ref 78–100)
MONOCYTES # BLD AUTO: 0.3 10E9/L (ref 0–1.3)
MONOCYTES NFR BLD AUTO: 5 %
NEUTROPHILS # BLD AUTO: 4.4 10E9/L (ref 1.6–8.3)
NEUTROPHILS NFR BLD AUTO: 70.2 %
NONHDLC SERPL-MCNC: 246 MG/DL
PLATELET # BLD AUTO: 261 10E9/L (ref 150–450)
POTASSIUM SERPL-SCNC: 4.2 MMOL/L (ref 3.4–5.3)
PROLACTIN SERPL-MCNC: 8 UG/L (ref 3–27)
PROT SERPL-MCNC: 7.2 G/DL (ref 6.8–8.8)
RBC # BLD AUTO: 3.88 10E12/L (ref 3.8–5.2)
SODIUM SERPL-SCNC: 146 MMOL/L (ref 133–144)
TRIGL SERPL-MCNC: 394 MG/DL
TSH SERPL DL<=0.005 MIU/L-ACNC: 1.56 MU/L (ref 0.4–4)
WBC # BLD AUTO: 6.2 10E9/L (ref 4–11)

## 2018-06-20 PROCEDURE — 80061 LIPID PANEL: CPT | Performed by: FAMILY MEDICINE

## 2018-06-20 PROCEDURE — 85025 COMPLETE CBC W/AUTO DIFF WBC: CPT | Performed by: FAMILY MEDICINE

## 2018-06-20 PROCEDURE — 80053 COMPREHEN METABOLIC PANEL: CPT | Performed by: FAMILY MEDICINE

## 2018-06-20 PROCEDURE — 84443 ASSAY THYROID STIM HORMONE: CPT | Performed by: FAMILY MEDICINE

## 2018-06-20 PROCEDURE — 36415 COLL VENOUS BLD VENIPUNCTURE: CPT | Performed by: FAMILY MEDICINE

## 2018-06-20 PROCEDURE — 84146 ASSAY OF PROLACTIN: CPT | Performed by: FAMILY MEDICINE

## 2018-06-20 PROCEDURE — 99214 OFFICE O/P EST MOD 30 MIN: CPT | Performed by: FAMILY MEDICINE

## 2018-06-20 RX ORDER — PAROXETINE 40 MG/1
40 TABLET, FILM COATED ORAL DAILY
Qty: 30 TABLET | Refills: 0 | Status: SHIPPED | OUTPATIENT
Start: 2018-06-20 | End: 2018-08-03

## 2018-06-20 ASSESSMENT — ANXIETY QUESTIONNAIRES
1. FEELING NERVOUS, ANXIOUS, OR ON EDGE: NOT AT ALL
3. WORRYING TOO MUCH ABOUT DIFFERENT THINGS: MORE THAN HALF THE DAYS
GAD7 TOTAL SCORE: 8
IF YOU CHECKED OFF ANY PROBLEMS ON THIS QUESTIONNAIRE, HOW DIFFICULT HAVE THESE PROBLEMS MADE IT FOR YOU TO DO YOUR WORK, TAKE CARE OF THINGS AT HOME, OR GET ALONG WITH OTHER PEOPLE: NOT DIFFICULT AT ALL
7. FEELING AFRAID AS IF SOMETHING AWFUL MIGHT HAPPEN: SEVERAL DAYS
5. BEING SO RESTLESS THAT IT IS HARD TO SIT STILL: SEVERAL DAYS
6. BECOMING EASILY ANNOYED OR IRRITABLE: MORE THAN HALF THE DAYS
2. NOT BEING ABLE TO STOP OR CONTROL WORRYING: SEVERAL DAYS

## 2018-06-20 ASSESSMENT — PATIENT HEALTH QUESTIONNAIRE - PHQ9: 5. POOR APPETITE OR OVEREATING: SEVERAL DAYS

## 2018-06-20 NOTE — PROGRESS NOTES
"  SUBJECTIVE:   Hayde Monique is a 47 year old female who presents to clinic today for the following health issues:      Depression and Anxiety Follow-Up    Status since last visit: No change    Other associated symptoms:None    Complicating factors:     Significant life event: No     Current substance abuse: None    PHQ-9 12/21/2016 10/20/2017 6/20/2018   Total Score 8 0 5   Q9: Suicide Ideation Not at all Not at all Not at all   F/U: Thoughts of suicide or self-harm - - No   F/U: Safety concerns - - No     CARLOS-7 SCORE 12/21/2016 10/20/2017 6/20/2018   Total Score - - -   Total Score 15 6 8     In the past two weeks have you had thoughts of suicide or self-harm?  No.    Do you have concerns about your personal safety or the safety of others?   No  PHQ-9  English  PHQ-9   Any Language  CARLOS-7  Suicide Assessment Five-step Evaluation and Treatment (SAFE-T)    Amount of exercise or physical activity: Walks everyday.     Problems taking medications regularly: No    Medication side effects: none    Diet: carbohydrate counting    Discuss Gastric Band- She is wondering if she would qualify for gastric band surgery. She states she has recently started a low carbohydrate diet. She states she has been walking regularly and just can't seem to lose weight.     Irregular Menstrual Period - for the past 3 weeks, Hayde has had irregular bleeding. Has been going through 1 tampon/hour. Historically, periods have been regular. Last normal period was 6/1/18 but was just on and off spotting. Feeling more fatuged now.  She has been taking ibuprofen intermittently.    Left knee pain: has been having a \"knotting\" pain and stiffness behind her left knee. Going up and down stairs seems to aggravate it or when she stands from a seated position. Denies any inciting injury or trauma. Knee is not locking and does not feel like it will give out. She has tried stretching and ibuprofen without improvement of symptoms.    Problem list and " histories reviewed & adjusted, as indicated.  Additional history: as documented    Patient Active Problem List   Diagnosis     CARDIOVASCULAR SCREENING; LDL GOAL LESS THAN 160     OCD (obsessive compulsive disorder)     Anxiety attack     Major depression     Generalized anxiety disorder     Melasma     Family history of breast cancer     Class 1 obesity due to excess calories without serious comorbidity with body mass index (BMI) of 32.0 to 32.9 in adult     Right shoulder pain     Aftercare following surgery of the musculoskeletal system     Past Surgical History:   Procedure Laterality Date     ABDOMEN SURGERY           ARTHROSCOPY SHOULDER Right 2017    Procedure:  Right shoulder arthroscopy and biceps tenolysis.  Arthroscopic subacromial decompresson (acromioplasty, bursectomy and coracoacromial ligament resection). Arthroscopic distal clavicle resection.  Surgeon:  Luca Rodríguez MD  Location: [facility]     BREAST SURGERY      implants       SECTION       COSMETIC SURGERY      Implants     ENT SURGERY      Tubes     orif leg Right     right lower leg, rodding     ORTHOPEDIC SURGERY      Broken Leg - chin and screws     WRIST SURGERY Left     Left wrist, cyst excision       Social History   Substance Use Topics     Smoking status: Never Smoker     Smokeless tobacco: Never Used     Alcohol use 0.0 oz/week      Comment: three times per week about 2 glasses of wine     Family History   Problem Relation Age of Onset     Cancer Mother 55     Lung cancer     Other Cancer Mother      Lung     Depression Mother      Anxiety Disorder Mother      Rheumatoid Arthritis Sister      Breast Cancer Maternal Grandmother      Rheumatoid Arthritis Paternal Grandmother      Depression Sister      Anxiety Disorder Sister            Reviewed and updated as needed this visit by clinical staff  Tobacco  Allergies  Meds  Problems       Reviewed and updated as needed this  "visit by Provider  Allergies  Meds  Problems         ROS:  Constitutional, HEENT, cardiovascular, pulmonary, gi and gu systems are negative, except as otherwise noted.    OBJECTIVE:     /88 (BP Location: Right arm, Patient Position: Sitting, Cuff Size: Adult Regular)  Pulse 106  Temp 98.5  F (36.9  C) (Oral)  Ht 5' 4\" (1.626 m)  Wt 202 lb (91.6 kg)  LMP 06/01/2018  SpO2 96%  BMI 34.67 kg/m2  Body mass index is 34.67 kg/(m^2).  GENERAL: healthy, alert and no distress  NECK: no adenopathy and no asymmetry, masses, or scars  RESP: lungs clear to auscultation - no rales, rhonchi or wheezes  CV: regular rate and rhythm, normal S1 S2, no S3 or S4, no murmur, click or rub, no peripheral edema and peripheral pulses strong  MS: no gross musculoskeletal defects noted, no edema; some mildly tender swelling posterior knee consistent with baker's cyst  SKIN: no suspicious lesions or rashes  NEURO: Normal strength and tone, mentation intact and speech normal  PSYCH: mentation appears normal and affect normal/bright    Diagnostic Test Results:  none     ASSESSMENT/PLAN:   1. Major depressive disorder, recurrent, severe without psychotic features (H): stable, continue Paxil and Buspar.  - PARoxetine (PAXIL) 40 MG tablet; Take 1 tablet (40 mg) by mouth daily  Dispense: 30 tablet; Refill: 0    2. Generalized anxiety disorder: symptoms stable; currently taking Paxil 40 mg and Buspar 5 mg.   - PARoxetine (PAXIL) 40 MG tablet; Take 1 tablet (40 mg) by mouth daily  Dispense: 30 tablet; Refill: 0    3. Visit for screening mammogram: due for mammogram.    4. Baker cyst, left: will refer to sports medicine for further evaluation and management.  - SPORTS MEDICINE REFERRAL    5. BMI 34.0-34.9,adult: I am not sure that she would necessarily qualify for weight loss surgery, however, does have elevated lipids. She is not diabetic, does not have hypertension. May benefit from nutritional counseling and consider medical weight " loss. Gino refer to bariatric clinic for further recommendations.  - BARIATRIC ADULT REFERRAL    6. Dysfunctional uterine bleeding: 3 week history of heavy bleeding. Gino check blood work and obtain pelvic ultrasound. Follow up based on results.  - Comprehensive metabolic panel  - Lipid panel reflex to direct LDL Fasting  - CBC with platelets differential  - TSH with free T4 reflex  - Prolactin  - US Pelvic Complete w Transvaginal; Future      A total of 25 minutes was spent with the patient today, with greater than 50% of the visit involving counseling and coordination of care of mood, new dysfunctional uterine bleeding, baker cyst, and weight loss questions.      Dominik Arizmendi,   [facility]

## 2018-06-20 NOTE — MR AVS SNAPSHOT
After Visit Summary   6/20/2018    Hayde Monique    MRN: [id number]           Patient Information     Date Of Birth          1970        Visit Information        Provider Department      6/20/2018 11:00 AM Dominik Arizmendi,  [facility] [place]        Today's Diagnoses     Major depressive disorder, recurrent, severe without psychotic features (H)    -  1    Generalized anxiety disorder        Visit for screening mammogram        Baker cyst, left        BMI 34.0-34.9,adult        Dysfunctional uterine bleeding           Follow-ups after your visit        Additional Services     BARIATRIC ADULT REFERRAL       Your provider has referred you to: [facility] [phone]. [email or web address]    Please be aware that coverage of these services is subject to the terms and limitations of your health insurance plan.  Call member services at your health plan with any benefit or coverage questions.      Please bring the following with you to your appointment:      (1) List of current medications   (2) This referral request   (3) Any documents/labs given to you for this referral            SPORTS MEDICINE REFERRAL       Your provider has referred you to:  [facility]: [place] [facility]  [phone]   [email or web address]    Please be aware that coverage of these services is subject to the terms and limitations of your health insurance plan.  Call member services at your health plan with any benefit or coverage questions.      Please bring the following to your appointment:    >>   Any x-rays, CTs or MRIs which have been performed.  Contact the facility where they were done to arrange for  prior to your scheduled appointment.    >>   List of current medications   >>    "This referral request   >>   Any documents/labs given to you for this referral                  Follow-up notes from your care team     Return in about 2 weeks (around 7/4/2018) for Pap smear.      Future tests that were ordered for you today     Open Future Orders        Priority Expected Expires Ordered    MA SCREENING DIGITAL BILAT - Future  (s+30) Routine  6/20/2019 6/20/2018            Who to contact     If you have questions or need follow up information about today's clinic visit or your schedule please contact [facility] [place] directly at [phone].  Normal or non-critical lab and imaging results will be communicated to you by Buzzer[employer]art, letter or phone within 4 business days after the clinic has received the results. If you do not hear from us within 7 days, please contact the clinic through mGeneratort or phone. If you have a critical or abnormal lab result, we will notify you by phone as soon as possible.  Submit refill requests through [employer] or call your pharmacy and they will forward the refill request to us. Please allow 3 business days for your refill to be completed.          Additional Information About Your Visit        [employer] Information     [employer] gives you secure access to your electronic health record. If you see a primary care provider, you can also send messages to your care team and make appointments. If you have questions, please call your primary care clinic.  If you do not have a primary care provider, please call [phone] and they will assist you.        Care EveryWhere ID     This is your Care EveryWhere ID. This could be used by other organizations to access your [place] medical records  [id number]        Your Vitals Were     Pulse Temperature Height Last Period Pulse Oximetry BMI (Body Mass Index)    106 98.5  F (36.9  C) (Oral) 5' 4\" (1.626 m) 06/01/2018 96% 34.67 kg/m2       Blood Pressure from Last 3 Encounters:   06/20/18 118/88   11/03/17 122/78   10/31/17 118/88 "    Weight from Last 3 Encounters:   06/20/18 202 lb (91.6 kg)   11/03/17 192 lb (87.1 kg)   10/31/17 195 lb (88.5 kg)              We Performed the Following     BARIATRIC ADULT REFERRAL     CBC with platelets differential     Comprehensive metabolic panel     Lipid panel reflex to direct LDL Fasting     Prolactin     SPORTS MEDICINE REFERRAL     TSH with free T4 reflex          Where to get your medicines      These medications were sent to [facility] [address] [place] - [address]  [address] [facility] [address]     Phone:  [phone]     PARoxetine 40 MG tablet          Primary Care Provider Office Phone # Fax #    Daisy Haystommiemasha, APRN [facility] [phone] [address] [place] [address]        Equal Access to Services     ANU FINCH : Hadii aad ku hadasho Soabena, waaxda luqadaha, qaybta kaalmada adeegyada, amy bae . So [facility] [phone].    ATENCIÓN: Si habla español, tiene a au disposición servicios gratuitos de asistencia lingüística. Yasmine al [phone].    We comply with applicable federal civil rights laws and [place] laws. We do not discriminate on the basis of race, color, national origin, age, disability, sex, sexual orientation, or gender identity.            Thank you!     Thank you for choosing [facility]  for your care. Our goal is always to provide you with excellent care. Hearing back from our patients is one way we can continue to improve our services. Please take a few minutes to complete the written survey that you may receive in the mail after your visit with us. Thank you!             Your Updated Medication List - Protect others around you: Learn how to safely use, store and throw away your medicines at www.disposemymeds.org.          This list is accurate as of 6/20/18 11:46 AM.  Always use your most recent med list.                   Brand Name Dispense Instructions for use  Diagnosis    busPIRone 5 MG tablet    BUSPAR    90 tablet    Take 1 tablet (5 mg) by mouth daily    Anxiety attack, Major depressive disorder, recurrent, severe without psychotic features (H), Generalized anxiety disorder       fluticasone 50 MCG/ACT spray    FLONASE    48 g    Spray 2 sprays into both nostrils daily    Seasonal allergic rhinitis       minocycline 100 MG tablet    DYNACIN    180 tablet    Take 1 tablet (100 mg) by mouth 2 times daily    Acne       PARoxetine 40 MG tablet    PAXIL    30 tablet    Take 1 tablet (40 mg) by mouth daily    Major depressive disorder, recurrent, severe without psychotic features (H), Generalized anxiety disorder

## 2018-06-22 ENCOUNTER — HOSPITAL ENCOUNTER (OUTPATIENT)
Dept: MAMMOGRAPHY | Facility: CLINIC | Age: 48
Discharge: HOME | End: 2018-06-22
Attending: FAMILY MEDICINE | Admitting: FAMILY MEDICINE
Payer: COMMERCIAL

## 2018-06-22 DIAGNOSIS — Z12.31 VISIT FOR SCREENING MAMMOGRAM: ICD-10-CM

## 2018-06-22 PROCEDURE — 77067 SCR MAMMO BI INCL CAD: CPT

## 2018-06-26 ENCOUNTER — RADIANT APPOINTMENT (OUTPATIENT)
Dept: GENERAL RADIOLOGY | Facility: CLINIC | Age: 48
End: 2018-06-26
Attending: FAMILY MEDICINE
Payer: COMMERCIAL

## 2018-06-26 ENCOUNTER — OFFICE VISIT (OUTPATIENT)
Dept: ORTHOPEDICS | Facility: CLINIC | Age: 48
End: 2018-06-26
Payer: COMMERCIAL

## 2018-06-26 ENCOUNTER — HOSPITAL ENCOUNTER (OUTPATIENT)
Dept: ULTRASOUND IMAGING | Facility: CLINIC | Age: 48
Discharge: HOME | End: 2018-06-26
Attending: FAMILY MEDICINE | Admitting: FAMILY MEDICINE
Payer: COMMERCIAL

## 2018-06-26 VITALS
BODY MASS INDEX: 34.49 KG/M2 | WEIGHT: 202 LBS | SYSTOLIC BLOOD PRESSURE: 132 MMHG | HEIGHT: 64 IN | DIASTOLIC BLOOD PRESSURE: 86 MMHG

## 2018-06-26 DIAGNOSIS — M71.22 BAKER CYST, LEFT: ICD-10-CM

## 2018-06-26 DIAGNOSIS — N93.8 DYSFUNCTIONAL UTERINE BLEEDING: ICD-10-CM

## 2018-06-26 DIAGNOSIS — M25.562 ACUTE PAIN OF LEFT KNEE: Primary | ICD-10-CM

## 2018-06-26 DIAGNOSIS — M25.562 ACUTE PAIN OF LEFT KNEE: ICD-10-CM

## 2018-06-26 DIAGNOSIS — M17.12 PRIMARY LOCALIZED OSTEOARTHROSIS OF LEFT LOWER LEG: ICD-10-CM

## 2018-06-26 PROCEDURE — 20610 DRAIN/INJ JOINT/BURSA W/O US: CPT | Mod: LT | Performed by: FAMILY MEDICINE

## 2018-06-26 PROCEDURE — 73562 X-RAY EXAM OF KNEE 3: CPT | Mod: FY | Performed by: FAMILY MEDICINE

## 2018-06-26 PROCEDURE — 76830 TRANSVAGINAL US NON-OB: CPT

## 2018-06-26 PROCEDURE — 99203 OFFICE O/P NEW LOW 30 MIN: CPT | Mod: 25 | Performed by: FAMILY MEDICINE

## 2018-06-26 PROCEDURE — 73562 X-RAY EXAM OF KNEE 3: CPT | Mod: FY

## 2018-06-26 RX ORDER — METHYLPREDNISOLONE ACETATE 40 MG/ML
40 INJECTION, SUSPENSION INTRA-ARTICULAR; INTRALESIONAL; INTRAMUSCULAR; SOFT TISSUE ONCE
Qty: 1 ML | Refills: 0 | OUTPATIENT
Start: 2018-06-26 | End: 2018-06-26

## 2018-06-26 RX ORDER — LIDOCAINE HYDROCHLORIDE 10 MG/ML
4 INJECTION, SOLUTION INFILTRATION; PERINEURAL ONCE
Qty: 4 ML | Refills: 0 | OUTPATIENT
Start: 2018-06-26 | End: 2018-06-26

## 2018-06-26 NOTE — PROGRESS NOTES
[facility]   Clinic Visit s Jun 26, 2018    PCP: Daisy Almonte      Hayde is a 47 year old female who is seen in consultation at the request of Dr. Arizmendi for   Chief Complaint   Patient presents with     Left Knee - Pain       Injury: Reports insidious onset without acute precipitating event.  Came on after she changed parking spots at work and needed to climb and descend 7 flights of stairs daily.  Regularly walks 12,000 steps daily; never had any knee trauma      Location of Pain: left knee posterior, nonradiating   Duration of Pain: 1 month(s)  Rating of Pain at worst: 7/10  Rating of Pain Currently: 8/10  Pain is better with: nothing   Pain is worse with: going from sitting to standing position and sitting   Treatment so far consists of: Pain medication: ibuprofen (Advil, Motrin)  Associated symptoms: no distal numbness or tingling; denies swelling or warmth  Recent imaging completed: No recent imaging completed.  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     Past Medical History:   Diagnosis Date     Anxiety attack      Depressive disorder 2000     Dermatofibroma 11/1/2015     Family history of breast cancer      LSIL (low grade squamous intraepithelial lesion) on Pap smear 10/2010    1/2012 pap/hpv neg     OCD (obsessive compulsive disorder)        Patient Active Problem List    Diagnosis Date Noted     Right shoulder pain 11/13/2017     Priority: Medium     Aftercare following surgery of the musculoskeletal system 11/13/2017     Priority: Medium     Class 1 obesity due to excess calories without serious comorbidity with body mass index (BMI) of 32.0 to 32.9 in adult 11/03/2017     Priority: Medium     Family history of breast cancer 03/27/2017     Priority: Medium     Maternal grandmother       Melasma 11/01/2015     Priority: Medium     Generalized anxiety disorder 05/19/2015      "Priority: Medium     Diagnosis updated by automated process. Provider to review and confirm.       Major depression 2014     Priority: Medium     OCD (obsessive compulsive disorder)      Priority: Medium     Anxiety attack      Priority: Medium     CARDIOVASCULAR SCREENING; LDL GOAL LESS THAN 160 2013     Priority: Medium       Family History   Problem Relation Age of Onset     Cancer Mother 55     Lung cancer     Other Cancer Mother      Lung     Depression Mother      Anxiety Disorder Mother      Rheumatoid Arthritis Sister      Breast Cancer Maternal Grandmother      Rheumatoid Arthritis Paternal Grandmother      Depression Sister      Anxiety Disorder Sister        Social History     Social History     Marital status:      Spouse name: N/A     Number of children: N/A     Years of education: N/A     Social History Main Topics     Smoking status: Never Smoker     Smokeless tobacco: Never Used     Alcohol use 0.0 oz/week      Comment: three times per week about 2 glasses of wine     Drug use: No     Past Surgical History:   Procedure Laterality Date     ABDOMEN SURGERY           ARTHROSCOPY SHOULDER Right 2017    Procedure:  Right shoulder arthroscopy and biceps tenolysis.  Arthroscopic subacromial decompresson (acromioplasty, bursectomy and coracoacromial ligament resection). Arthroscopic distal clavicle resection.  Surgeon:  Luca Rodríguez MD  Location: [facility]     BREAST SURGERY      implants       SECTION       COSMETIC SURGERY      Implants     ENT SURGERY      Tubes     orif leg Right     right lower leg, rodding     ORTHOPEDIC SURGERY      Broken Leg - chin and screws     WRIST SURGERY Left     Left wrist, cyst excision       Review of Systems   Musculoskeletal: Positive for joint pain.   All other systems reviewed and are negative.        Physical Exam  /86  Ht 5' 4\" (1.626 m)  Wt 202 lb (91.6 kg)  LMP 2018  " BMI 34.67 kg/m2  Constitutional:well-developed, well-nourished, and in no distress.   Cardiovascular: Intact distal pulses.    Neurological: alert. Gait Normal:   Gait, station, stance, and balance appear normal for age  Skin: Skin is warm and dry.   Psychiatric: Mood and affect normal.   Respiratory: unlabored, speaks in full sentences  Lymph: no LAD, no lymphangitis          Left Knee Exam   Swelling: Mild  Effusion: Yes    Tenderness   The patient is experiencing tenderness in the Posterior knee.    Range of Motion   Extension: Normal  Flexion:     Normal    Tests   McMurrays:  Medial - Negative      Lateral - Negative  Lachman:  Anterior - Negative    Posterior - Negative  Drawer:       Anterior - Negative     Posterior - Negative  Varus:  Negative  Valgus: Negative  Pivot Shift: Negative  Patellar Apprehension: No    Comments:  Mild posterior knee swelling at site of tenderness consistent with small Baker's cyst    Right Knee Exam   Swelling: None  Effusion: No    Tenderness   None    Range of Motion   Extension: Normal  Flexion:     Normal    Tests   McMurrays:  Medial - Negative      Lateral - Negative  Lachman:  Anterior - Negative    Posterior - Negative  Drawer:       Anterior - Negative    Posterior - Negative  Varus:  Negative  Valgus: Negative  Pivot Shift: Negative  Patellar Apprehension: No          X-ray images Ordered and independently reviewed by me in the office today with the patient. X-ray shows:   Recent Results (from the past 48 hour(s))   XR Knee Standing AP Bilat Blain Bilat Lat Left    Narrative    6/26/2018    Trace osteophytes off superior and lateral aspect of the left patella and   in the intra-articular notch of the left knee and off the superior patella   on the right knee.         ASSESSMENT/PLAN    ICD-10-CM    1. Acute pain of left knee M25.562 XR Knee Standing AP Bilat Blain Bilat Lat Left   2. Primary localized osteoarthrosis of left lower leg M17.12 DRAIN/INJECT LARGE  JOINT/BURSA     METHYLPREDNISOLONE 40 MG INJ     methylPREDNISolone acetate (DEPO-MEDROL) 40 MG/ML injection     lidocaine 1 % injection   3. Baker cyst, left M71.22 DRAIN/INJECT LARGE JOINT/BURSA     METHYLPREDNISOLONE 40 MG INJ     methylPREDNISolone acetate (DEPO-MEDROL) 40 MG/ML injection     lidocaine 1 % injection     Mild effusion and symptomatic Baker's cyst in the setting of increased activity involving knee flexion, with no trauma, suggests inflammatory flare of low-grade osteoarthritis as noted on x-rays.  Differential includes meniscus injury, degenerative or discrete though this seems less likely without trauma.  Discussed options including MRI or cortisone injection, and she opted for the latter as empiric treatment.  If this fails to resolve symptoms for longer than 6 weeks then MRI would be reconsidered.  The benefits, risks, indications for and alternatives to the procedure were discussed with the patient, including bleeding, infection, hyperglycemia, localized lipodystrophy and hypopigmentation. She elected to proceed, and informed consent was signed.    PROCEDURE:  JOINT INJECTION.         After a discussion of risks, benefits and side effects of procedure, informed patient consent was obtained, and form signed by patient and physician.       The left  knee was prepped with Chloroprep.    INJECTION:  Using 4 cc of 1% lidocaine mixed                           with 40 mg of DepoMedrol, the left knee joint was successfully injected                           without complication using a 22G needle with the patient lying supine and the injection administered using the superolateral or lateral patellar approach.  She tolerated the procedure well with minimal discomfort. Bandaid applied. Instructed to monitor for increased warmth, redness, pain or swelling which might indicate an infection.

## 2018-06-26 NOTE — MR AVS SNAPSHOT
After Visit Summary   6/26/2018    Hayde Monique    MRN: [id number]           Patient Information     Date Of Birth          1970        Visit Information        Provider Department      6/26/2018 3:20 PM Iván Duran MD [facility] [place]        Today's Diagnoses     Acute pain of left knee    -  1    Primary localized osteoarthrosis of left lower leg        Baker cyst, left           Follow-ups after your visit        Your next 10 appointments already scheduled     Jun 26, 2018  4:20 PM CDT   US PELVIC COMPLETE W TRANSVAGINAL with [id number]   [facility] [place] ([facility] [place] [facility])    [address]  [facility] [address] [phone]           Please bring a list of your medicines (including vitamins, minerals and over-the-counter drugs). Also, tell your doctor about any allergies you may have. Wear comfortable clothes and leave your valuables at home.  Adults: Drink six 8-ounce glasses of fluid one hour before your exam. Do NOT empty your bladder.  If you need to empty your bladder before your exam, try to release only a little bit of urine. Then, drink another 8oz glass of fluid.  Children: Children who are potty trained should drink at least 4 cups (32 oz) of liquid 45 minutes to one hour prior to the exam. The child s bladder must be full in order to achieve a diagnostic exam. If your child is very uncomfortable or has an urgent need to pee, please notify a technologist; they will try to find out how much longer the wait may be and provide instructions to help relieve the pressure. Occasionally it is medically necessary to insert a urinary catheter to fill the bladder.  Please call the Imaging Department at your exam site with any questions.            Jul 02, 2018  6:20 PM CDT   Office Visit with Ludy Sepulveda PA-C   [facility] ([facility])    [address]  [place] [address] [phone]           Bring a current list of meds and any records pertaining to this visit. For Physicals, please bring immunization records and any forms needing to be filled out. Please arrive 10 minutes early to complete paperwork.            Jul 18, 2018  1:00 PM CDT   (Arrive by 12:45 PM)   New Bariatric Surgery Consult with Lucinda Macias PA-C   [facility] ([facility] [place] [facility])    [address] [place] [address] [phone]            Jul 18, 2018  2:00 PM CDT   (Arrive by 1:45 PM)   NUTRITION VISIT with Magui Martin RD   [facility] ([facility])    [address] [place] [address] [phone]              Who to contact     If you have questions or need follow up information about today's clinic visit or your schedule please contact [facility] [place] directly at [phone].  Normal or non-critical lab and imaging results will be communicated to you by Alediahart, letter or phone within 4 business days after the clinic has received the results. If you do not hear from us within 7 days, please contact the clinic through Procuricst or phone. If you have a critical or abnormal lab result, we will notify you by phone as soon as possible.  Submit refill requests through Dipity or call your pharmacy and they will forward the refill request to us. Please allow 3 business days for your refill to be completed.          Additional Information About Your Visit        Dipity Information     Dipity gives you secure access to your electronic health record. If you see a primary care provider, you can also send messages to your care team and make appointments. If you have questions, please call your primary care clinic.  If you do not have a primary care provider, please call [phone] and they  "will assist you.        Care EveryWhere ID     This is your Care EveryWhere ID. This could be used by other organizations to access your [place] medical records  [id number]        Your Vitals Were     Height Last Period BMI (Body Mass Index)             5' 4\" (1.626 m) 06/01/2018 34.67 kg/m2          Blood Pressure from Last 3 Encounters:   06/26/18 132/86   06/20/18 118/88   11/03/17 122/78    Weight from Last 3 Encounters:   06/26/18 202 lb (91.6 kg)   06/20/18 202 lb (91.6 kg)   11/03/17 192 lb (87.1 kg)              We Performed the Following     DRAIN/INJECT LARGE JOINT/BURSA     METHYLPREDNISOLONE 40 MG INJ          Today's Medication Changes          These changes are accurate as of 6/26/18  4:10 PM.  If you have any questions, ask your nurse or doctor.               Start taking these medicines.        Dose/Directions    lidocaine 1 % injection   Used for:  Primary localized osteoarthrosis of left lower leg, Baker cyst, left   Started by:  Iván Duran MD        Dose:  4 mL   4 mLs by INTRA-ARTICULAR route once for 1 dose   Quantity:  4 mL   Refills:  0       methylPREDNISolone acetate 40 MG/ML injection   Commonly known as:  DEPO-MEDROL   Used for:  Primary localized osteoarthrosis of left lower leg, Baker cyst, left   Started by:  Iván Duran MD        Dose:  40 mg   1 mL (40 mg) by INTRA-ARTICULAR route once for 1 dose MEDICATION: Depo Medrol 40mg/ml ROUTE: Intra-articular joint injection  SITE: knee  DOSE: 40 LOT #: [id number] : Pfizer EXPIRATION DATE:  2/2019 NDC: 7261-4605-78   Quantity:  1 mL   Refills:  0            Where to get your medicines      Some of these will need a paper prescription and others can be bought over the counter.  Ask your nurse if you have questions.     You don't need a prescription for these medications     lidocaine 1 % injection    methylPREDNISolone acetate 40 MG/ML injection                Primary Care Provider Office Phone # Fax #    " Daisy STARR Gage, APRN [phone]       [address] [place] [address]        Equal Access to Services     ANU FINCH : Hadii waldemar ku hadroco Sorosannaali, waaxda luqadaha, qaybta kaalmada adeegyada, amy mckeon lyleitz forte kathia . So [facility] [phone].    ATENCIÓN: Si habla español, tiene a au disposición servicios gratuitos de asistencia lingüística. Llame al [phone].    We comply with applicable federal civil rights laws and [place] laws. We do not discriminate on the basis of race, color, national origin, age, disability, sex, sexual orientation, or gender identity.            Thank you!     Thank you for choosing [facility]  for your care. Our goal is always to provide you with excellent care. Hearing back from our patients is one way we can continue to improve our services. Please take a few minutes to complete the written survey that you may receive in the mail after your visit with us. Thank you!             Your Updated Medication List - Protect others around you: Learn how to safely use, store and throw away your medicines at www.disposemymeds.org.          This list is accurate as of 6/26/18  4:10 PM.  Always use your most recent med list.                   Brand Name Dispense Instructions for use Diagnosis    busPIRone 5 MG tablet    BUSPAR    90 tablet    Take 1 tablet (5 mg) by mouth daily    Anxiety attack, Major depressive disorder, recurrent, severe without psychotic features (H), Generalized anxiety disorder       fluticasone 50 MCG/ACT spray    FLONASE    48 g    Spray 2 sprays into both nostrils daily    Seasonal allergic rhinitis       lidocaine 1 % injection     4 mL    4 mLs by INTRA-ARTICULAR route once for 1 dose    Primary localized osteoarthrosis of left lower leg, Baker cyst, left       methylPREDNISolone acetate 40 MG/ML injection    DEPO-MEDROL    1 mL    1 mL (40 mg) by INTRA-ARTICULAR route  once for 1 dose MEDICATION: Depo Medrol 40mg/ml ROUTE: Intra-articular joint injection  SITE: knee  DOSE: 40 LOT #: [id number] : Pfizer EXPIRATION DATE:  2/2019 NDC: 7425-6373-79    Primary localized osteoarthrosis of left lower leg, Baker cyst, left       minocycline 100 MG tablet    DYNACIN    180 tablet    Take 1 tablet (100 mg) by mouth 2 times daily    Acne       PARoxetine 40 MG tablet    PAXIL    30 tablet    Take 1 tablet (40 mg) by mouth daily    Major depressive disorder, recurrent, severe without psychotic features (H), Generalized anxiety disorder

## 2018-06-26 NOTE — LETTER
6/26/2018         RE: Hayde Monique  [address] [place] [address]        Dear Colleague,    Thank you for referring your patient, Hayde Monique, to the [facility]. Please see a copy of my visit note below.    HPI   [facility]   Clinic Visit s Jun 26, 2018    PCP: Daisy Almonte      Hayde is a 47 year old female who is seen in consultation at the request of Dr. Arizmendi for   Chief Complaint   Patient presents with     Left Knee - Pain       Injury: Reports insidious onset without acute precipitating event.  Came on after she changed parking spots at work and needed to climb and descend 7 flights of stairs daily.  Regularly walks 12,000 steps daily; never had any knee trauma      Location of Pain: left knee posterior, nonradiating   Duration of Pain: 1 month(s)  Rating of Pain at worst: 7/10  Rating of Pain Currently: 8/10  Pain is better with: nothing   Pain is worse with: going from sitting to standing position and sitting   Treatment so far consists of: Pain medication: ibuprofen (Advil, Motrin)  Associated symptoms: no distal numbness or tingling; denies swelling or warmth  Recent imaging completed: No recent imaging completed.  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     Past Medical History:   Diagnosis Date     Anxiety attack      Depressive disorder 2000     Dermatofibroma 11/1/2015     Family history of breast cancer      LSIL (low grade squamous intraepithelial lesion) on Pap smear 10/2010    1/2012 pap/hpv neg     OCD (obsessive compulsive disorder)        Patient Active Problem List    Diagnosis Date Noted     Right shoulder pain 11/13/2017     Priority: Medium     Aftercare following surgery of the musculoskeletal system 11/13/2017     Priority: Medium     Class 1 obesity due to excess calories without serious comorbidity with body mass index  (BMI) of 32.0 to 32.9 in adult 2017     Priority: Medium     Family history of breast cancer 2017     Priority: Medium     Maternal grandmother       Melasma 2015     Priority: Medium     Generalized anxiety disorder 2015     Priority: Medium     Diagnosis updated by automated process. Provider to review and confirm.       Major depression 2014     Priority: Medium     OCD (obsessive compulsive disorder)      Priority: Medium     Anxiety attack      Priority: Medium     CARDIOVASCULAR SCREENING; LDL GOAL LESS THAN 160 2013     Priority: Medium       Family History   Problem Relation Age of Onset     Cancer Mother 55     Lung cancer     Other Cancer Mother      Lung     Depression Mother      Anxiety Disorder Mother      Rheumatoid Arthritis Sister      Breast Cancer Maternal Grandmother      Rheumatoid Arthritis Paternal Grandmother      Depression Sister      Anxiety Disorder Sister        Social History     Social History     Marital status:      Spouse name: N/A     Number of children: N/A     Years of education: N/A     Social History Main Topics     Smoking status: Never Smoker     Smokeless tobacco: Never Used     Alcohol use 0.0 oz/week      Comment: three times per week about 2 glasses of wine     Drug use: No     Past Surgical History:   Procedure Laterality Date     ABDOMEN SURGERY           ARTHROSCOPY SHOULDER Right 2017    Procedure:  Right shoulder arthroscopy and biceps tenolysis.  Arthroscopic subacromial decompresson (acromioplasty, bursectomy and coracoacromial ligament resection). Arthroscopic distal clavicle resection.  Surgeon:  Luca Rodríguez MD  Location: [facility]     BREAST SURGERY      implants       SECTION       COSMETIC SURGERY      Implants     ENT SURGERY      Tubes     orif leg Right     right lower leg, rodding     ORTHOPEDIC SURGERY      Broken Leg - chin and screws     WRIST SURGERY  "Left 1990    Left wrist, cyst excision       Review of Systems   Musculoskeletal: Positive for joint pain.   All other systems reviewed and are negative.        Physical Exam  /86  Ht 5' 4\" (1.626 m)  Wt 202 lb (91.6 kg)  LMP 06/01/2018  BMI 34.67 kg/m2  Constitutional:well-developed, well-nourished, and in no distress.   Cardiovascular: Intact distal pulses.    Neurological: alert. Gait Normal:   Gait, station, stance, and balance appear normal for age  Skin: Skin is warm and dry.   Psychiatric: Mood and affect normal.   Respiratory: unlabored, speaks in full sentences  Lymph: no LAD, no lymphangitis          Left Knee Exam   Swelling: Mild  Effusion: Yes    Tenderness   The patient is experiencing tenderness in the Posterior knee.    Range of Motion   Extension: Normal  Flexion:     Normal    Tests   McMurrays:  Medial - Negative      Lateral - Negative  Lachman:  Anterior - Negative    Posterior - Negative  Drawer:       Anterior - Negative     Posterior - Negative  Varus:  Negative  Valgus: Negative  Pivot Shift: Negative  Patellar Apprehension: No    Comments:  Mild posterior knee swelling at site of tenderness consistent with small Baker's cyst    Right Knee Exam   Swelling: None  Effusion: No    Tenderness   None    Range of Motion   Extension: Normal  Flexion:     Normal    Tests   McMurrays:  Medial - Negative      Lateral - Negative  Lachman:  Anterior - Negative    Posterior - Negative  Drawer:       Anterior - Negative    Posterior - Negative  Varus:  Negative  Valgus: Negative  Pivot Shift: Negative  Patellar Apprehension: No          X-ray images Ordered and independently reviewed by me in the office today with the patient. X-ray shows:   Recent Results (from the past 48 hour(s))   XR Knee Standing AP Bilat [place] Bilat Lat Left    Narrative    6/26/2018    Trace osteophytes off superior and lateral aspect of the left patella and   in the intra-articular notch of the left knee and off the " superior patella   on the right knee.         ASSESSMENT/PLAN    ICD-10-CM    1. Acute pain of left knee M25.562 XR Knee Standing AP Bilat [place] Bilat Lat Left   2. Primary localized osteoarthrosis of left lower leg M17.12 DRAIN/INJECT LARGE JOINT/BURSA     METHYLPREDNISOLONE 40 MG INJ     methylPREDNISolone acetate (DEPO-MEDROL) 40 MG/ML injection     lidocaine 1 % injection   3. Baker cyst, left M71.22 DRAIN/INJECT LARGE JOINT/BURSA     METHYLPREDNISOLONE 40 MG INJ     methylPREDNISolone acetate (DEPO-MEDROL) 40 MG/ML injection     lidocaine 1 % injection     Mild effusion and symptomatic Baker's cyst in the setting of increased activity involving knee flexion, with no trauma, suggests inflammatory flare of low-grade osteoarthritis as noted on x-rays.  Differential includes meniscus injury, degenerative or discrete though this seems less likely without trauma.  Discussed options including MRI or cortisone injection, and she opted for the latter as empiric treatment.  If this fails to resolve symptoms for longer than 6 weeks then MRI would be reconsidered.  The benefits, risks, indications for and alternatives to the procedure were discussed with the patient, including bleeding, infection, hyperglycemia, localized lipodystrophy and hypopigmentation. She elected to proceed, and informed consent was signed.    PROCEDURE:  JOINT INJECTION.         After a discussion of risks, benefits and side effects of procedure, informed patient consent was obtained, and form signed by patient and physician.       The left  knee was prepped with Chloroprep.    INJECTION:  Using 4 cc of 1% lidocaine mixed                           with 40 mg of DepoMedrol, the left knee joint was successfully injected                           without complication using a 22G needle with the patient lying supine and the injection administered using the superolateral or lateral patellar approach.  She tolerated the procedure well with minimal  discomfort. Bandaid applied. Instructed to monitor for increased warmth, redness, pain or swelling which might indicate an infection.      Again, thank you for allowing me to participate in the care of your patient.        Sincerely,        Iván Duran MD

## 2018-06-27 DIAGNOSIS — F33.2 MAJOR DEPRESSIVE DISORDER, RECURRENT, SEVERE WITHOUT PSYCHOTIC FEATURES (H): ICD-10-CM

## 2018-06-27 DIAGNOSIS — F41.1 GENERALIZED ANXIETY DISORDER: ICD-10-CM

## 2018-06-27 DIAGNOSIS — F41.0 ANXIETY ATTACK: ICD-10-CM

## 2018-06-27 NOTE — TELEPHONE ENCOUNTER
"Requested Prescriptions   Pending Prescriptions Disp Refills     busPIRone (BUSPAR) 5 MG tablet  Last Written Prescription Date:  12/21/2016  Last Fill Quantity: 90 tablet,  # refills: 3   Last office visit: 6/20/2018 with prescribing provider:  Kyleigh     Future Office Visit:   Next 5 appointments (look out 90 days)     Jul 02, 2018  6:20 PM CDT   [facility] Ludy [facility]ul[facility]-C   [facility]    [address]  [facility] [address] [phone]                    90 tablet 3     Sig: Take 1 tablet (5 mg) by mouth daily    Atypical Antidepressants Protocol Failed    6/27/2018  1:55 PM       Failed - Patient has PHQ-9 score less than 5 in past 6 months.    Please review last PHQ-9 score.     PHQ-9 SCORE 12/21/2016 12/21/2016 10/20/2017   Total Score - - -   Total Score MyChart - - -   Total Score 8 8 0     CARLOS-7 SCORE 12/21/2016 12/21/2016 10/20/2017   Total Score - - -   Total Score 15 15 6          Passed - Patient is age 18 or older       Passed - No active pregnancy on record       Passed - No positive pregnancy test in past 12 mos       Passed - Recent (6 mo) or future (30 days) visit within the authorizing provider's specialty    Patient had office visit in the last 6 months or has a visit in the next 30 days with authorizing provider or within the authorizing provider's specialty.  See \"Patient Info\" tab in inbasket, or \"Choose Columns\" in Meds & Orders section of the refill encounter.              "

## 2018-06-28 DIAGNOSIS — D25.9 UTERINE LEIOMYOMA, UNSPECIFIED LOCATION: ICD-10-CM

## 2018-06-28 DIAGNOSIS — N92.6 IRREGULAR MENSES: Primary | ICD-10-CM

## 2018-06-28 RX ORDER — BUSPIRONE HYDROCHLORIDE 5 MG/1
5 TABLET ORAL DAILY
Qty: 90 TABLET | Refills: 1 | Status: SHIPPED | OUTPATIENT
Start: 2018-06-28 | End: 2019-02-11

## 2018-06-28 NOTE — TELEPHONE ENCOUNTER
Routing refill request to provider for review/approval because:  Not current:  PHQ-9 - patient did just have recent office visit on 6/20/17, however, I do not see an updated PHQ-9. Please advise. Thank you.  Laura Mathias RN, BSN  [facility]

## 2018-07-02 ASSESSMENT — PATIENT HEALTH QUESTIONNAIRE - PHQ9: SUM OF ALL RESPONSES TO PHQ QUESTIONS 1-9: 5

## 2018-07-02 ASSESSMENT — ANXIETY QUESTIONNAIRES: GAD7 TOTAL SCORE: 8

## 2018-07-16 ENCOUNTER — CARE COORDINATION (OUTPATIENT)
Dept: SURGERY | Facility: CLINIC | Age: 48
End: 2018-07-16

## 2018-08-03 ENCOUNTER — MYC REFILL (OUTPATIENT)
Dept: FAMILY MEDICINE | Facility: CLINIC | Age: 48
End: 2018-08-03

## 2018-08-03 DIAGNOSIS — L70.9 ACNE: ICD-10-CM

## 2018-08-03 DIAGNOSIS — F41.1 GENERALIZED ANXIETY DISORDER: ICD-10-CM

## 2018-08-03 DIAGNOSIS — F33.2 MAJOR DEPRESSIVE DISORDER, RECURRENT, SEVERE WITHOUT PSYCHOTIC FEATURES (H): ICD-10-CM

## 2018-08-03 DIAGNOSIS — L70.9 ACNE, UNSPECIFIED ACNE TYPE: ICD-10-CM

## 2018-08-03 RX ORDER — MINOCYCLINE HYDROCHLORIDE 100 MG/1
100 TABLET ORAL 2 TIMES DAILY
Qty: 180 TABLET | Refills: 3 | Status: CANCELLED | OUTPATIENT
Start: 2018-08-03

## 2018-08-03 NOTE — TELEPHONE ENCOUNTER
Message from [employer]:  Original authorizing provider: DO Hayde Shepherd would like a refill of the following medications:  PARoxetine (PAXIL) 40 MG tablet [Dominik Arizmendi DO]    [facility] [address] [place] [address]    Comment:  Paxil - it is only a 30 day supply and I would like a 90 day supply. I was just at the clinic for this. [facility] Minocycline - I just finished 180 day supply. I take it for my acne on my face. I thought Dr. Arizmendi approved the refill on this also but they won't fill it. [facility] Thank    Medication renewals requested in this message routed to other providers:  minocycline (DYNACIN) 100 MG tablet [Luisa Self, MARY CNP]

## 2018-08-06 RX ORDER — MINOCYCLINE HYDROCHLORIDE 100 MG/1
100 CAPSULE ORAL 2 TIMES DAILY
Qty: 180 CAPSULE | Refills: 1 | Status: SHIPPED | OUTPATIENT
Start: 2018-08-06 | End: 2019-03-01

## 2018-08-06 RX ORDER — PAROXETINE 40 MG/1
40 TABLET, FILM COATED ORAL DAILY
Qty: 90 TABLET | Refills: 1 | Status: SHIPPED | OUTPATIENT
Start: 2018-08-06 | End: 2019-02-11

## 2018-08-06 RX ORDER — MINOCYCLINE HYDROCHLORIDE 100 MG/1
100 TABLET ORAL 2 TIMES DAILY
Qty: 180 TABLET | Refills: 1 | Status: SHIPPED | OUTPATIENT
Start: 2018-08-06 | End: 2018-08-06 | Stop reason: ALTCHOICE

## 2018-08-06 RX ORDER — MINOCYCLINE HYDROCHLORIDE 100 MG/1
100 TABLET ORAL 2 TIMES DAILY
Qty: 180 TABLET | Refills: 1 | OUTPATIENT
Start: 2018-08-06 | End: 2024-08-08

## 2018-08-06 NOTE — TELEPHONE ENCOUNTER
Yaniv pharmacist at [facility] is asking if okay to change Minocycline from tablets to capsules. Verbal okay given and reviewed with Dr. Arizmendi. Changed in Epic. Kassy Van R.N.

## 2018-08-06 NOTE — TELEPHONE ENCOUNTER
Refills signed and sent to [facility] pharmacy.    Dominik Arizmendi DO  8/6/2018 11:38 AM

## 2018-08-06 NOTE — TELEPHONE ENCOUNTER
"Requested Prescriptions   Pending Prescriptions Disp Refills     PARoxetine (PAXIL) 40 MG tablet  Last Written Prescription Date:  6/20/2018  Last Fill Quantity: 30 tablet,  # refills: 0   Last office visit: 6/20/2018 with prescribing provider:  Kyliegh   Future Office Visit:       30 tablet 0     Sig: Take 1 tablet (40 mg) by mouth daily    SSRIs Protocol Failed    8/3/2018  5:09 PM       Failed - PHQ-9 score less than 5 in past 6 months    Please review last PHQ-9 score.   PHQ-9 SCORE 12/21/2016 10/20/2017 6/20/2018   Total Score - - -   Total Score MyChart - - -   Total Score 8 0 5     CARLOS-7 SCORE 12/21/2016 10/20/2017 6/20/2018   Total Score - - -   Total Score 15 6 8          Passed - Patient is age 18 or older       Passed - No active pregnancy on record       Passed - No positive pregnancy test in last 12 months       Passed - Recent (6 mo) or future (30 days) visit within the authorizing provider's specialty    Patient had office visit in the last 6 months or has a visit in the next 30 days with authorizing provider or within the authorizing provider's specialty.  See \"Patient Info\" tab in inbasket, or \"Choose Columns\" in Meds & Orders section of the refill encounter.              "

## 2018-08-07 ENCOUNTER — CARE COORDINATION (OUTPATIENT)
Dept: SURGERY | Facility: CLINIC | Age: 48
End: 2018-08-07

## 2018-08-07 NOTE — PROGRESS NOTES
Instructed to view seminar and complete pre-visit questionnaires prior to visit at [email or web address]. Left number if needing to reschedule also gave time for appt.

## 2018-08-07 NOTE — TELEPHONE ENCOUNTER
Signed/filled on 08/06/18 under provider Dominik Arizmendi DO.    Closing encounter.    Dania Stanley RN  [facility]

## 2018-08-07 NOTE — TELEPHONE ENCOUNTER
Message from [employer]:  Original authorizing provider: MARY Hart CNP would like a refill of the following medications:  minocycline (DYNACIN) 100 MG tablet [MARY Hart CNP]    Preferred pharmacy: [facility] [address] [place] - [address]    Comment:  Paxil - it is only a 30 day supply and I would like a 90 day supply. I was just at the clinic for this. [facility] Minocycline - I just finished 180 day supply. I take it for my acne on my face. I thought Dr. Arizmendi approved the refill on this also but they won't fill it. [facility] Thank    Medication renewals requested in this message routed to other providers:  PARoxetine (PAXIL) 40 MG tablet [Dominik Arizmendi, DO]

## 2018-09-05 ENCOUNTER — MYC MEDICAL ADVICE (OUTPATIENT)
Dept: ORTHOPEDICS | Facility: CLINIC | Age: 48
End: 2018-09-05

## 2018-12-18 ENCOUNTER — OFFICE VISIT (OUTPATIENT)
Dept: ORTHOPEDICS | Facility: CLINIC | Age: 48
End: 2018-12-18
Payer: COMMERCIAL

## 2018-12-18 ENCOUNTER — ANCILLARY PROCEDURE (OUTPATIENT)
Dept: GENERAL RADIOLOGY | Facility: CLINIC | Age: 48
End: 2018-12-18
Attending: ORTHOPAEDIC SURGERY
Payer: COMMERCIAL

## 2018-12-18 VITALS — BODY MASS INDEX: 34.33 KG/M2 | SYSTOLIC BLOOD PRESSURE: 128 MMHG | DIASTOLIC BLOOD PRESSURE: 76 MMHG | WEIGHT: 200 LBS

## 2018-12-18 DIAGNOSIS — G89.29 CHRONIC PAIN OF LEFT ELBOW: Primary | ICD-10-CM

## 2018-12-18 DIAGNOSIS — M25.522 CHRONIC PAIN OF LEFT ELBOW: Primary | ICD-10-CM

## 2018-12-18 DIAGNOSIS — M25.522 LEFT ELBOW PAIN: ICD-10-CM

## 2018-12-18 PROCEDURE — 99213 OFFICE O/P EST LOW 20 MIN: CPT | Performed by: ORTHOPAEDIC SURGERY

## 2018-12-18 PROCEDURE — 73080 X-RAY EXAM OF ELBOW: CPT | Mod: LT | Performed by: ORTHOPAEDIC SURGERY

## 2018-12-18 NOTE — PROGRESS NOTES
HISTORY OF PRESENT ILLNESS:    Hayde Monique is a 48 year old female who is seen as self referral for left elbow pain that started approximately 1 year ago.  No injury or trauma.     Present symptoms:  She notes today she has pain of the posterior elbow, and at times will have pain across the anterior aspect. She notes the pain feels like it is a squeezing sensation and stays localized to the elbow region.  The pain is constant.  She notes increased discomfort with initial movements after rest.  She notes weaknes of the left arm since onset of symptoms. Denies numbness and tingling, swelling.   She has not noted pain waking her up at night.  She denies any radiculopathy symptoms.  She is right-hand dominant.  Treatments tried to this point: none.   Orthopedic PMH: right shoulder arthroscopy, DEC/DCR 17    Past Medical History:   Diagnosis Date     Anxiety attack      Depressive disorder 2000     Dermatofibroma 2015     Family history of breast cancer      LSIL (low grade squamous intraepithelial lesion) on Pap smear 10/2010    2012 pap/hpv neg     OCD (obsessive compulsive disorder)        Past Surgical History:   Procedure Laterality Date     ABDOMEN SURGERY           ARTHROSCOPY SHOULDER Right 2017    Procedure:  Right shoulder arthroscopy and biceps tenolysis.  Arthroscopic subacromial decompresson (acromioplasty, bursectomy and coracoacromial ligament resection). Arthroscopic distal clavicle resection.  Surgeon:  Luca Rodríguez MD  Location: [facility]     BREAST SURGERY      implants       SECTION       COSMETIC SURGERY      Implants     ENT SURGERY      Tubes     orif leg Right     right lower leg, rodding     ORTHOPEDIC SURGERY      Broken Leg - chin and screws     WRIST SURGERY Left     Left wrist, cyst excision       Family History   Problem Relation Age of Onset     Cancer Mother 55        Lung cancer     Other Cancer Mother          Lung     Depression Mother      Anxiety Disorder Mother      Rheumatoid Arthritis Sister      Breast Cancer Maternal Grandmother      Rheumatoid Arthritis Paternal Grandmother      Depression Sister      Anxiety Disorder Sister        Social History     Socioeconomic History     Marital status:      Spouse name: Not on file     Number of children: Not on file     Years of education: Not on file     Highest education level: Not on file   Social Needs     Financial resource strain: Not on file     Food insecurity - worry: Not on file     Food insecurity - inability: Not on file     Transportation needs - medical: Not on file     Transportation needs - non-medical: Not on file   Occupational History     Not on file   Tobacco Use     Smoking status: Never Smoker     Smokeless tobacco: Never Used   Substance and Sexual Activity     Alcohol use: Yes     Alcohol/week: 0.0 oz     Comment: three times per week about 2 glasses of wine     Drug use: No     Sexual activity: Yes     Partners: Male     Birth control/protection: Male Surgical, None   Other Topics Concern     Parent/sibling w/ CABG, MI or angioplasty before 65F 55M? No      Service No     Blood Transfusions No     Caffeine Concern Yes     Comment: 2 pops a day      Occupational Exposure Not Asked     Hobby Hazards Not Asked     Sleep Concern Not Asked     Stress Concern Not Asked     Weight Concern Not Asked     Special Diet Not Asked     Back Care Not Asked     Exercise Not Asked     Bike Helmet Not Asked     Seat Belt Not Asked     Self-Exams Yes   Social History Narrative     Not on file       Current Outpatient Medications   Medication Sig Dispense Refill     busPIRone (BUSPAR) 5 MG tablet Take 1 tablet (5 mg) by mouth daily 90 tablet 1     fluticasone (FLONASE) 50 MCG/ACT spray Spray 2 sprays into both nostrils daily 48 g 1     minocycline (MINOCIN/DYNACIN) 100 MG capsule Take 1 capsule (100 mg) by mouth 2 times daily 180 capsule 1      PARoxetine (PAXIL) 40 MG tablet Take 1 tablet (40 mg) by mouth daily 90 tablet 1       No Known Allergies    REVIEW OF SYSTEMS:  CONSTITUTIONAL:  NEGATIVE for fever, chills, change in weight  INTEGUMENTARY/SKIN:  NEGATIVE for worrisome rashes, moles or lesions  EYES:  NEGATIVE for vision changes or irritation  ENT/MOUTH:  NEGATIVE for ear, mouth and throat problems  RESP:  NEGATIVE for significant cough or SOB  BREAST:  NEGATIVE for masses, tenderness or discharge  CV:  NEGATIVE for chest pain, palpitations or peripheral edema  GI:  NEGATIVE for nausea, abdominal pain, heartburn, or change in bowel habits  :  Negative   MUSCULOSKELETAL:  See HPI above  NEURO:  NEGATIVE for weakness, dizziness or paresthesias  ENDOCRINE:  NEGATIVE for temperature intolerance, skin/hair changes  HEME/ALLERGY/IMMUNE:  NEGATIVE for bleeding problems  PSYCHIATRIC:  Depression, panic attacks    PHYSICAL EXAM:  /76 (BP Location: Right arm, Patient Position: Chair, Cuff Size: Adult Regular)   Wt 90.7 kg (200 lb)   BMI 34.33 kg/m    Body mass index is 34.33 kg/m .   GENERAL APPEARANCE: healthy, alert and no distress   HEENT: No apparent thyroid megaly. Clear sclera with normal ocular movement  RESPIRATORY: No labored breathing  SKIN: no suspicious lesions or rashes  NEURO: Normal strength and tone, mentation intact and speech normal  VASCULAR: Good pulses, and capillary refill   LYMPH: no lymphadenopathy   PSYCH:  mentation appears normal and affect normal/bright    MUSCULOSKELETAL:  Full range of motion of the shoulders  Negative impingement sign  Negative arm drop test  Mild biceps groove tenderness, left    Full range of motion of the elbows  No deformities in the left elbow  No swelling or erythema, left elbow  No palpable bony prominences, in particular at the olecranon tip  No palpable pain specific to the elbow  No medial or lateral epicondyle pain to palpation  Motor strength is full  No crepitus  Sensation is intact  Skin  is intact      ASSESSMENT:  left elbow pain, at the moment, not clear about the etiology  Doing well from right shoulder surgery of 2017    PLAN:  X-ray images of the left elbow taken today were visualized.  Findings were explained.  Other than very minimal degenerative changes at the humeral ulnar joint, no other specific findings are noted.  Similarly, clinical examination were not very impressive either.  At this point, without lack of well identified pathology, further observation with symptomatic treatments was felt to be the best.  Over-the-counter medications and icing/heat and activity modification.  Follow-up as needed.      Imaging Interpretation:   3 views of the left elbow are negative.    Luca Rodríguez MD  Department of Orthopedic Surgery        Disclaimer: This note consists of symbols derived from keyboarding, dictation and/or voice recognition software. As a result, there may be errors in the script that have gone undetected. Please consider this when interpreting information found in this chart.

## 2018-12-18 NOTE — LETTER
2018         RE: Hayde Monique  [id number] [place] [address]        Dear Colleague,    Thank you for referring your patient, Hayde Monique, to the [facility]. Please see a copy of my visit note below.    HISTORY OF PRESENT ILLNESS:    Hayde Monique is a 48 year old female who is seen as self referral for left elbow pain that started approximately 1 year ago.  No injury or trauma.     Present symptoms:  She notes today she has pain of the posterior elbow, and at times will have pain across the anterior aspect. She notes the pain feels like it is a squeezing sensation and stays localized to the elbow region.  The pain is constant.  She notes increased discomfort with initial movements after rest.  She notes weaknes of the left arm since onset of symptoms. Denies numbness and tingling, swelling.   She has not noted pain waking her up at night.  She denies any radiculopathy symptoms.  She is right-hand dominant.  Treatments tried to this point: none.   Orthopedic PMH: right shoulder arthroscopy, DEC/DCR 17    Past Medical History:   Diagnosis Date     Anxiety attack      Depressive disorder 2000     Dermatofibroma 2015     Family history of breast cancer      LSIL (low grade squamous intraepithelial lesion) on Pap smear 10/2010    2012 pap/hpv neg     OCD (obsessive compulsive disorder)        Past Surgical History:   Procedure Laterality Date     ABDOMEN SURGERY           ARTHROSCOPY SHOULDER Right 2017    Procedure:  Right shoulder arthroscopy and biceps tenolysis.  Arthroscopic subacromial decompresson (acromioplasty, bursectomy and coracoacromial ligament resection). Arthroscopic distal clavicle resection.  Surgeon:  Luca Rodríguez MD  Location: [facility]     BREAST SURGERY      implants       SECTION       COSMETIC SURGERY      Implants     ENT SURGERY      Tubes     orif leg Right     right lower leg, rodding      ORTHOPEDIC SURGERY  1995    Broken Leg - chin and screws     WRIST SURGERY Left 1990    Left wrist, cyst excision       Family History   Problem Relation Age of Onset     Cancer Mother 55        Lung cancer     Other Cancer Mother         Lung     Depression Mother      Anxiety Disorder Mother      Rheumatoid Arthritis Sister      Breast Cancer Maternal Grandmother      Rheumatoid Arthritis Paternal Grandmother      Depression Sister      Anxiety Disorder Sister        Social History     Socioeconomic History     Marital status:      Spouse name: Not on file     Number of children: Not on file     Years of education: Not on file     Highest education level: Not on file   Social Needs     Financial resource strain: Not on file     Food insecurity - worry: Not on file     Food insecurity - inability: Not on file     Transportation needs - medical: Not on file     Transportation needs - non-medical: Not on file   Occupational History     Not on file   Tobacco Use     Smoking status: Never Smoker     Smokeless tobacco: Never Used   Substance and Sexual Activity     Alcohol use: Yes     Alcohol/week: 0.0 oz     Comment: three times per week about 2 glasses of wine     Drug use: No     Sexual activity: Yes     Partners: Male     Birth control/protection: Male Surgical, None   Other Topics Concern     Parent/sibling w/ CABG, MI or angioplasty before 65F 55M? No      Service No     Blood Transfusions No     Caffeine Concern Yes     Comment: 2 pops a day      Occupational Exposure Not Asked     Hobby Hazards Not Asked     Sleep Concern Not Asked     Stress Concern Not Asked     Weight Concern Not Asked     Special Diet Not Asked     Back Care Not Asked     Exercise Not Asked     Bike Helmet Not Asked     Seat Belt Not Asked     Self-Exams Yes   Social History Narrative     Not on file       Current Outpatient Medications   Medication Sig Dispense Refill     busPIRone (BUSPAR) 5 MG tablet Take 1 tablet (5  mg) by mouth daily 90 tablet 1     fluticasone (FLONASE) 50 MCG/ACT spray Spray 2 sprays into both nostrils daily 48 g 1     minocycline (MINOCIN/DYNACIN) 100 MG capsule Take 1 capsule (100 mg) by mouth 2 times daily 180 capsule 1     PARoxetine (PAXIL) 40 MG tablet Take 1 tablet (40 mg) by mouth daily 90 tablet 1       No Known Allergies    REVIEW OF SYSTEMS:  CONSTITUTIONAL:  NEGATIVE for fever, chills, change in weight  INTEGUMENTARY/SKIN:  NEGATIVE for worrisome rashes, moles or lesions  EYES:  NEGATIVE for vision changes or irritation  ENT/MOUTH:  NEGATIVE for ear, mouth and throat problems  RESP:  NEGATIVE for significant cough or SOB  BREAST:  NEGATIVE for masses, tenderness or discharge  CV:  NEGATIVE for chest pain, palpitations or peripheral edema  GI:  NEGATIVE for nausea, abdominal pain, heartburn, or change in bowel habits  :  Negative   MUSCULOSKELETAL:  See HPI above  NEURO:  NEGATIVE for weakness, dizziness or paresthesias  ENDOCRINE:  NEGATIVE for temperature intolerance, skin/hair changes  HEME/ALLERGY/IMMUNE:  NEGATIVE for bleeding problems  PSYCHIATRIC:  Depression, panic attacks    PHYSICAL EXAM:  /76 (BP Location: Right arm, Patient Position: Chair, Cuff Size: Adult Regular)   Wt 90.7 kg (200 lb)   BMI 34.33 kg/m     Body mass index is 34.33 kg/m .   GENERAL APPEARANCE: healthy, alert and no distress   HEENT: No apparent thyroid megaly. Clear sclera with normal ocular movement  RESPIRATORY: No labored breathing  SKIN: no suspicious lesions or rashes  NEURO: Normal strength and tone, mentation intact and speech normal  VASCULAR: Good pulses, and capillary refill   LYMPH: no lymphadenopathy   PSYCH:  mentation appears normal and affect normal/bright    MUSCULOSKELETAL:  Full range of motion of the shoulders  Negative impingement sign  Negative arm drop test  Mild biceps groove tenderness, left    Full range of motion of the elbows  No deformities in the left elbow  No swelling or  erythema, left elbow  No palpable bony prominences, in particular at the olecranon tip  No palpable pain specific to the elbow  No medial or lateral epicondyle pain to palpation  Motor strength is full  No crepitus  Sensation is intact  Skin is intact      ASSESSMENT:  left elbow pain, at the moment, not clear about the etiology  Doing well from right shoulder surgery of 2017    PLAN:  X-ray images of the left elbow taken today were visualized.  Findings were explained.  Other than very minimal degenerative changes at the humeral ulnar joint, no other specific findings are noted.  Similarly, clinical examination were not very impressive either.  At this point, without lack of well identified pathology, further observation with symptomatic treatments was felt to be the best.  Over-the-counter medications and icing/heat and activity modification.  Follow-up as needed.      Imaging Interpretation:   3 views of the left elbow are negative.    Luca Rodríguez MD  Department of Orthopedic Surgery        Disclaimer: This note consists of symbols derived from keyboarding, dictation and/or voice recognition software. As a result, there may be errors in the script that have gone undetected. Please consider this when interpreting information found in this chart.      Again, thank you for allowing me to participate in the care of your patient.        Sincerely,        Luca Rodríguez MD

## 2019-01-07 DIAGNOSIS — L70.9 ACNE, UNSPECIFIED ACNE TYPE: ICD-10-CM

## 2019-01-08 RX ORDER — MINOCYCLINE HYDROCHLORIDE 100 MG/1
CAPSULE ORAL
Qty: 180 CAPSULE | Refills: 1 | Status: SHIPPED | OUTPATIENT
Start: 2019-01-08 | End: 2019-03-01

## 2019-01-08 NOTE — TELEPHONE ENCOUNTER
"Requested Prescriptions   Pending Prescriptions Disp Refills     minocycline (MINOCIN/DYNACIN) 100 MG capsule [Pharmacy Med Name: MINOCYCLINE HCL 100MG CAPS]  Last Written Prescription Date:  8/6/2018  Last Fill Quantity: 180 capsule,  # refills: 1   Last office visit: 6/20/2018 with prescribing provider:  Kyleigh     Future Office Visit:       180 capsule 1     Sig: TAKE ONE CAPSULE BY MOUTH TWICE A DAY    Oral Acne/Rosacea Medications Protocol Failed - 1/7/2019  4:14 PM       Failed - Confirmation of diagnosis is required    Please confirm diagnosis is acne or rosacea.     If NOT acne or rosacea; refer request to provider for further evaluation.    If diagnosis IS acne or rosacea, OK to refill BASED ON PREVIOUS REFILL CLINICAL NOTE RECOMMENDATION.         Passed - Patient is 12 years of age or older       Passed - Recent (12 mo) or future (30 days) visit within the authorizing provider's specialty    Patient had office visit in the last 12 months or has a visit in the next 30 days with authorizing provider or within the authorizing provider's specialty.  See \"Patient Info\" tab in inbasket, or \"Choose Columns\" in Meds & Orders section of the refill encounter.             Passed - Medication is active on med list       Passed - No active pregnancy on record       Passed - No positive prenancy test is past 12 months          "

## 2019-01-08 NOTE — TELEPHONE ENCOUNTER
Prescription approved per [facility] Refill Protocol.  ELIE MeehanN, RN  [facility]

## 2019-01-09 NOTE — TELEPHONE ENCOUNTER
SICK DAY FOODS    When you cannot eat regular foods, soft foods or liquids may be more easily tolerated.  Selecting 3 or 4 servings from the items below can substitute for your regular meal.  If the volume is too large for one time, try consuming one per hour.  Fluids with calories can satisfy both the need for liquids and carbohydrates.     Serving Size Food Grams of Carbohydrates     1/2 cup Apple Juice 15 grams   1/3 cup White Grape Juice 15 grams   1/2 cup Regular Ginger-Marianela 12 grams   1/2 cup Regular 7-Up 12 grams   1 cup Gatorade (no red or purple) 14 grams   3 tsp. sugar In Herbal Tea 12 grams   1/3 cup Regular gelatin (no red or purple)* 12 grams   1/2 twin pop Popsicle (no red or purple)* 12 grams   5 pieces Lifesavers 15 grams   1 cup Clear Broth 12 grams   3 tsp. honey In Herbal Tea 12 grams   3 tsp. regular syrup In Herbal Tea 12 grams   1/2 cup  Clear Jell-O (no red or purple)* 15 grams         *The Jell-O must be plain, no fruit or whipped cream, no red or purple.   Routing refill request to provider for review/approval because:  Hayde would like a 90 day supply. See also below she would like minocycline: Comment:  Paxil - it is only a 30 day supply and I would like a 90 day supply.  I was just at the clinic for this.  [facility] savage Minocycline - I just finished 180 day supply.  I take it for my acne on my face.  I thought Dr. Arizmendi approved the refill on this also but they won't fill it.  Hyvee savage Thank    Medication renewals requested in this message routed to other providers:  minocycline (DYNACIN) 100 MG tablet [Luisa Self, APRN CNP]  Minocycline must be pended in another encounter.   Thank you,   Kassy Van R.N.

## 2019-02-11 DIAGNOSIS — F33.2 MAJOR DEPRESSIVE DISORDER, RECURRENT, SEVERE WITHOUT PSYCHOTIC FEATURES (H): ICD-10-CM

## 2019-02-11 DIAGNOSIS — F41.0 ANXIETY ATTACK: ICD-10-CM

## 2019-02-11 DIAGNOSIS — F41.1 GENERALIZED ANXIETY DISORDER: ICD-10-CM

## 2019-02-11 NOTE — TELEPHONE ENCOUNTER
Routing refill request to provider for review/approval because:  A break in medication--buspar 6 mo supply would have run out end of December.   May be due for follow up?  Kassy Van R.N.

## 2019-02-11 NOTE — TELEPHONE ENCOUNTER
"Requested Prescriptions   Pending Prescriptions Disp Refills     PARoxetine (PAXIL) 40 MG tablet [Pharmacy Med Name: PAROXETINE HCL 40MG TABS]  Last Written Prescription Date:  8/6/18  Last Fill Quantity: 90,  # refills: 1   Last office visit: 6/20/2018 with prescribing provider:  Kyleigh   Future Office Visit:     90 tablet 1     Sig: TAKE ONE TABLET BY MOUTH EVERY DAY    SSRIs Protocol Failed - 2/11/2019  9:15 AM       Failed - PHQ-9 score less than 5 in past 6 months    Please review last PHQ-9 score.   PHQ-9 SCORE 12/21/2016 10/20/2017 6/20/2018   PHQ-9 Total Score - - -   PHQ-9 Total Score MyChart - - -   PHQ-9 Total Score 8 0 5     CARLOS-7 SCORE 12/21/2016 10/20/2017 6/20/2018   Total Score - - -   Total Score 15 6 8          Failed - Recent (6 mo) or future (30 days) visit within the authorizing provider's specialty    Patient had office visit in the last 6 months or has a visit in the next 30 days with authorizing provider or within the authorizing provider's specialty.  See \"Patient Info\" tab in inbasket, or \"Choose Columns\" in Meds & Orders section of the refill encounter.           Passed - Medication is active on med list       Passed - Patient is age 18 or older       Passed - No active pregnancy on record       Passed - No positive pregnancy test in last 12 months        busPIRone (BUSPAR) 5 MG tablet [Pharmacy Med Name: BUSPIRONE HCL 5MG TABS]  Last Written Prescription Date:  6/28/18  Last Fill Quantity: 90,  # refills: 1   Last office visit: 6/20/2018 with prescribing provider:  Kyleigh   Future Office Visit:     90 tablet 1     Sig: TAKE ONE TABLET BY MOUTH EVERY DAY    Atypical Antidepressants Protocol Failed - 2/11/2019  9:15 AM       Failed - Patient has PHQ-9 score less than 5 in past 6 months.    Please review last PHQ-9 score.   PHQ-9 SCORE 12/21/2016 10/20/2017 6/20/2018   PHQ-9 Total Score - - -   PHQ-9 Total Score MyChart - - -   PHQ-9 Total Score 8 0 5     CARLOS-7 SCORE 12/21/2016 10/20/2017 " "6/20/2018   Total Score - - -   Total Score 15 6 8          Failed - Recent (6 mo) or future (30 days) visit within the authorizing provider's specialty    Patient had office visit in the last 6 months or has a visit in the next 30 days with authorizing provider or within the authorizing provider's specialty.  See \"Patient Info\" tab in inbasket, or \"Choose Columns\" in Meds & Orders section of the refill encounter.           Passed - Medication active on med list       Passed - Patient is age 18 or older       Passed - No active pregnancy on record       Passed - No positive pregnancy test in past 12 mos          "

## 2019-02-13 ENCOUNTER — MYC REFILL (OUTPATIENT)
Dept: FAMILY MEDICINE | Facility: CLINIC | Age: 49
End: 2019-02-13

## 2019-02-13 DIAGNOSIS — F41.1 GENERALIZED ANXIETY DISORDER: ICD-10-CM

## 2019-02-13 DIAGNOSIS — F33.2 MAJOR DEPRESSIVE DISORDER, RECURRENT, SEVERE WITHOUT PSYCHOTIC FEATURES (H): ICD-10-CM

## 2019-02-13 RX ORDER — PAROXETINE 40 MG/1
TABLET, FILM COATED ORAL
Qty: 30 TABLET | Refills: 0 | Status: SHIPPED | OUTPATIENT
Start: 2019-02-13 | End: 2019-03-01

## 2019-02-13 RX ORDER — PAROXETINE 40 MG/1
40 TABLET, FILM COATED ORAL DAILY
Qty: 30 TABLET | Refills: 0 | Status: CANCELLED | OUTPATIENT
Start: 2019-02-13

## 2019-02-13 RX ORDER — BUSPIRONE HYDROCHLORIDE 5 MG/1
TABLET ORAL
Qty: 30 TABLET | Refills: 0 | Status: SHIPPED | OUTPATIENT
Start: 2019-02-13 | End: 2019-03-01

## 2019-02-13 NOTE — TELEPHONE ENCOUNTER
Chart reviewed. She was last seen in June 2018. Due for follow up in clinic. 30 day refill signed. Please contact patient and assist her in scheduling appointment.    Dominik Arizmendi,   2/13/2019 12:33 PM

## 2019-02-14 NOTE — TELEPHONE ENCOUNTER
Please see 2/13/19 MyChart refill encounter. [employer] message sent to patient with information below.  ELIE MeehanN, RN  [facility]

## 2019-02-14 NOTE — TELEPHONE ENCOUNTER
Please see 2/11/19 refill encounter.  Laura Mathias, ELIEN, RN  [facility]

## 2019-02-25 ENCOUNTER — MYC MEDICAL ADVICE (OUTPATIENT)
Dept: FAMILY MEDICINE | Facility: CLINIC | Age: 49
End: 2019-02-25

## 2019-02-25 NOTE — TELEPHONE ENCOUNTER
is working on rescheduling patients appointment. They have her down on 3/1 at 11 am & will wait for her to confirm. No further action needed at this time.    Lisa Calixto

## 2019-03-01 ENCOUNTER — OFFICE VISIT (OUTPATIENT)
Dept: FAMILY MEDICINE | Facility: CLINIC | Age: 49
End: 2019-03-01
Payer: COMMERCIAL

## 2019-03-01 VITALS
OXYGEN SATURATION: 99 % | SYSTOLIC BLOOD PRESSURE: 128 MMHG | RESPIRATION RATE: 16 BRPM | HEIGHT: 64 IN | DIASTOLIC BLOOD PRESSURE: 82 MMHG | BODY MASS INDEX: 33.89 KG/M2 | WEIGHT: 198.5 LBS | HEART RATE: 79 BPM | TEMPERATURE: 97.7 F

## 2019-03-01 DIAGNOSIS — Z12.4 SCREENING FOR CERVICAL CANCER: ICD-10-CM

## 2019-03-01 DIAGNOSIS — Z11.3 SCREEN FOR STD (SEXUALLY TRANSMITTED DISEASE): ICD-10-CM

## 2019-03-01 DIAGNOSIS — L70.9 ACNE, UNSPECIFIED ACNE TYPE: ICD-10-CM

## 2019-03-01 DIAGNOSIS — F33.2 MAJOR DEPRESSIVE DISORDER, RECURRENT, SEVERE WITHOUT PSYCHOTIC FEATURES (H): ICD-10-CM

## 2019-03-01 DIAGNOSIS — F41.1 GENERALIZED ANXIETY DISORDER: ICD-10-CM

## 2019-03-01 DIAGNOSIS — Z00.00 ROUTINE GENERAL MEDICAL EXAMINATION AT A HEALTH CARE FACILITY: Primary | ICD-10-CM

## 2019-03-01 LAB
CHOLEST SERPL-MCNC: 316 MG/DL
GLUCOSE SERPL-MCNC: 72 MG/DL (ref 70–99)
HDLC SERPL-MCNC: 54 MG/DL
HGB BLD-MCNC: 13.1 G/DL (ref 11.7–15.7)
LDLC SERPL CALC-MCNC: 221 MG/DL
NONHDLC SERPL-MCNC: 262 MG/DL
TRIGL SERPL-MCNC: 207 MG/DL

## 2019-03-01 PROCEDURE — 80061 LIPID PANEL: CPT | Performed by: NURSE PRACTITIONER

## 2019-03-01 PROCEDURE — 99396 PREV VISIT EST AGE 40-64: CPT | Performed by: NURSE PRACTITIONER

## 2019-03-01 PROCEDURE — G0145 SCR C/V CYTO,THINLAYER,RESCR: HCPCS | Performed by: NURSE PRACTITIONER

## 2019-03-01 PROCEDURE — 99213 OFFICE O/P EST LOW 20 MIN: CPT | Mod: 25 | Performed by: NURSE PRACTITIONER

## 2019-03-01 PROCEDURE — 87624 HPV HI-RISK TYP POOLED RSLT: CPT | Performed by: NURSE PRACTITIONER

## 2019-03-01 PROCEDURE — 0064U ANTB TP TOTAL&RPR IA QUAL: CPT | Performed by: NURSE PRACTITIONER

## 2019-03-01 PROCEDURE — 87491 CHLMYD TRACH DNA AMP PROBE: CPT | Performed by: NURSE PRACTITIONER

## 2019-03-01 PROCEDURE — 86803 HEPATITIS C AB TEST: CPT | Performed by: NURSE PRACTITIONER

## 2019-03-01 PROCEDURE — 85018 HEMOGLOBIN: CPT | Performed by: NURSE PRACTITIONER

## 2019-03-01 PROCEDURE — 87591 N.GONORRHOEAE DNA AMP PROB: CPT | Performed by: NURSE PRACTITIONER

## 2019-03-01 PROCEDURE — 36415 COLL VENOUS BLD VENIPUNCTURE: CPT | Performed by: NURSE PRACTITIONER

## 2019-03-01 PROCEDURE — 82947 ASSAY GLUCOSE BLOOD QUANT: CPT | Performed by: NURSE PRACTITIONER

## 2019-03-01 PROCEDURE — 87389 HIV-1 AG W/HIV-1&-2 AB AG IA: CPT | Performed by: NURSE PRACTITIONER

## 2019-03-01 PROCEDURE — 82306 VITAMIN D 25 HYDROXY: CPT | Performed by: NURSE PRACTITIONER

## 2019-03-01 RX ORDER — MINOCYCLINE HYDROCHLORIDE 100 MG/1
CAPSULE ORAL
Qty: 180 CAPSULE | Refills: 1 | Status: SHIPPED | OUTPATIENT
Start: 2019-03-01 | End: 2019-10-15

## 2019-03-01 RX ORDER — PAROXETINE 40 MG/1
40 TABLET, FILM COATED ORAL DAILY
Qty: 90 TABLET | Refills: 1 | Status: SHIPPED | OUTPATIENT
Start: 2019-03-01 | End: 2019-09-03

## 2019-03-01 RX ORDER — BUSPIRONE HYDROCHLORIDE 5 MG/1
5 TABLET ORAL DAILY
Qty: 90 TABLET | Refills: 1 | Status: SHIPPED | OUTPATIENT
Start: 2019-03-01 | End: 2019-09-03

## 2019-03-01 ASSESSMENT — MIFFLIN-ST. JEOR: SCORE: 1520.39

## 2019-03-01 NOTE — PROGRESS NOTES
SUBJECTIVE:   CC: Hayde Monique is an 48 year old woman who presents for preventive health visit.     Physical   Annual:     Getting at least 3 servings of Calcium per day:  Yes    Bi-annual eye exam:  Yes    Dental care twice a year:  Yes    Sleep apnea or symptoms of sleep apnea:  None    Diet:  Carbohydrate counting    Frequency of exercise:  2-3 days/week    Duration of exercise:  30-45 minutes    Taking medications regularly:  Yes    Additional concerns today:  No    PHQ-2 Total Score: 0        Today's PHQ-2 Score:   PHQ-2 ( 1999 [employer]) 2/27/2019   Q1: Little interest or pleasure in doing things 0   Q2: Feeling down, depressed or hopeless 0   PHQ-2 Score 0   Q1: Little interest or pleasure in doing things Not at all   Q2: Feeling down, depressed or hopeless Not at all   PHQ-2 Score 0       Abuse: Current or Past(Physical, Sexual or Emotional)- No  Do you feel safe in your environment? Yes    Social History     Tobacco Use     Smoking status: Never Smoker     Smokeless tobacco: Never Used   Substance Use Topics     Alcohol use: Yes     Alcohol/week: 0.0 oz     Comment: three times per week about 2 glasses of wine     Alcohol Use 2/27/2019   If you drink alcohol do you typically have greater than 3 drinks per day OR greater than 7 drinks per week? No   No flowsheet data found.    Reviewed orders with patient.  Reviewed health maintenance and updated orders accordingly - Yes  Labs reviewed in EPIC  BP Readings from Last 3 Encounters:   03/01/19 128/82   12/18/18 128/76   06/26/18 132/86    Wt Readings from Last 3 Encounters:   03/01/19 90 kg (198 lb 8 oz)   12/18/18 90.7 kg (200 lb)   06/26/18 91.6 kg (202 lb)                  Patient Active Problem List   Diagnosis     CARDIOVASCULAR SCREENING; LDL GOAL LESS THAN 160     OCD (obsessive compulsive disorder)     Anxiety attack     Major depression     Generalized anxiety disorder     Melasma     Family history of breast cancer     Class 1 obesity due to excess  calories without serious comorbidity with body mass index (BMI) of 32.0 to 32.9 in adult     Right shoulder pain     Aftercare following surgery of the musculoskeletal system     Past Surgical History:   Procedure Laterality Date     ABDOMEN SURGERY           ARTHROSCOPY SHOULDER Right 2017    Procedure:  Right shoulder arthroscopy and biceps tenolysis.  Arthroscopic subacromial decompresson (acromioplasty, bursectomy and coracoacromial ligament resection). Arthroscopic distal clavicle resection.  Surgeon:  Luca Rodríguez MD  Location: [facility]     BREAST SURGERY      implants       SECTION       COSMETIC SURGERY      Implants     ENT SURGERY      Tubes     orif leg Right     right lower leg, rodding     ORTHOPEDIC SURGERY      Broken Leg - chin and screws     WRIST SURGERY Left     Left wrist, cyst excision       Social History     Tobacco Use     Smoking status: Never Smoker     Smokeless tobacco: Never Used   Substance Use Topics     Alcohol use: Yes     Alcohol/week: 0.0 oz     Comment: three times per week about 2 glasses of wine     Family History   Problem Relation Age of Onset     Cancer Mother 55        Lung cancer     Other Cancer Mother         Lung     Depression Mother      Anxiety Disorder Mother      Rheumatoid Arthritis Sister      Breast Cancer Maternal Grandmother      Rheumatoid Arthritis Paternal Grandmother      Depression Sister      Anxiety Disorder Sister          Current Outpatient Medications   Medication Sig Dispense Refill     busPIRone (BUSPAR) 5 MG tablet TAKE ONE TABLET BY MOUTH EVERY DAY 30 tablet 0     fluticasone (FLONASE) 50 MCG/ACT spray Spray 2 sprays into both nostrils daily 48 g 1     minocycline (MINOCIN/DYNACIN) 100 MG capsule TAKE ONE CAPSULE BY MOUTH TWICE A  capsule 1     minocycline (MINOCIN/DYNACIN) 100 MG capsule Take 1 capsule (100 mg) by mouth 2 times daily 180 capsule 1     PARoxetine (PAXIL) 40 MG  "tablet TAKE ONE TABLET BY MOUTH EVERY DAY 30 tablet 0     No Known Allergies  Recent Labs   Lab Test 06/20/18  1150 12/21/16  1630 10/08/14  1115 04/02/13  1212   *  --  200* 191*   HDL 48*  --  52 51   TRIG 394*  --  109 201*   ALT 39  --  55* 61*   CR 0.54 0.60 0.62 0.64   GFRESTIMATED >90 >90  Non  GFR Calc   >90  Non  GFR Calc   >90   GFRESTBLACK >90 >90   GFR Calc   >90   GFR Calc   >90   POTASSIUM 4.2 4.0 4.6 4.4   TSH 1.56 2.37  --  2.75        Mammogram Screening: Patient under age 50, mutual decision reflected in health maintenance.      Pertinent mammograms are reviewed under the imaging tab.  History of abnormal Pap smear:   NO - age 30- 65 PAP every 3 years recommended  Last 3 Pap Results:   PAP (no units)   Date Value   10/08/2014 NIL   04/02/2013 NIL     PAP / HPV 10/8/2014 4/2/2013   PAP NIL NIL     Sexually active.  Requesting STD panel.    Reviewed and updated as needed this visit by clinical staff  Tobacco  Allergies  Meds  Med Hx  Surg Hx  Fam Hx  Soc Hx        Reviewed and updated as needed this visit by Provider          Acne:  On minocycline.  She has seen dermatology in the past \"but they couldn't help me.\"  She has been on the minocycline \"forever.\"  When she stops the minocycline, she gets a return of her acne, especially on her chin.      Buspar/Paxil:  For anxiety/depression.  Mood is controlled.  \"When I don't take it, I know it.\"      Review of Systems  CONSTITUTIONAL: NEGATIVE for fever, chills, change in weight  INTEGUMENTARU/SKIN: NEGATIVE for worrisome rashes, moles or lesions  EYES: NEGATIVE for vision changes or irritation  ENT: NEGATIVE for ear, mouth and throat problems  RESP: NEGATIVE for significant cough or SOB  BREAST: NEGATIVE for masses, tenderness or discharge  CV: NEGATIVE for chest pain, palpitations or peripheral edema  GI: NEGATIVE for nausea, abdominal pain, heartburn, or change in bowel " "habits  : NEGATIVE for unusual urinary or vaginal symptoms. Periods are regular.  MUSCULOSKELETAL: NEGATIVE for significant arthralgias or myalgia  NEURO: NEGATIVE for weakness, dizziness or paresthesias  PSYCHIATRIC: NEGATIVE for changes in mood or affect     OBJECTIVE:   /82   Pulse 79   Temp 97.7  F (36.5  C) (Oral)   Resp 16   Ht 1.626 m (5' 4\")   Wt 90 kg (198 lb 8 oz)   LMP 02/11/2019 (Exact Date)   SpO2 99%   Breastfeeding? No   BMI 34.07 kg/m    Physical Exam  GENERAL: healthy, alert and no distress  EYES: Eyes grossly normal to inspection, PERRL and conjunctivae and sclerae normal  HENT: ear canals and TM's normal, nose and mouth without ulcers or lesions  NECK: no adenopathy, no asymmetry, masses, or scars and thyroid normal to palpation  RESP: lungs clear to auscultation - no rales, rhonchi or wheezes  BREAST: normal without masses, tenderness or nipple discharge and no palpable axillary masses or adenopathy  CV: regular rate and rhythm, normal S1 S2, no S3 or S4, no murmur, click or rub, no peripheral edema and peripheral pulses strong  ABDOMEN: soft, nontender, no hepatosplenomegaly, no masses and bowel sounds normal   (female): normal female external genitalia, normal urethral meatus, vaginal mucosa pink, moist, well rugated, and normal cervix/adnexa/uterus without masses or discharge  MS: no gross musculoskeletal defects noted, no edema  SKIN: no suspicious lesions or rashes  NEURO: Normal strength and tone, mentation intact and speech normal  PSYCH: mentation appears normal, affect normal/bright    Diagnostic Test Results:  Results pending    ASSESSMENT/PLAN:   (Z00.00) Routine general medical examination at a health care facility  (primary encounter diagnosis)  Comment:   Plan: Pap imaged thin layer screen with HPV -         recommended age 30 - 65 years (select HPV order        below), HPV High Risk Types DNA Cervical,         Chlamydia trachomatis PCR, Neisseria         " gonorrhoeae PCR, Hepatitis C antibody, HIV         Antigen Antibody Combo, Treponema Abs w Reflex         to RPR and Titer, Lipid panel reflex to direct         LDL Fasting, Hemoglobin, Glucose, Vitamin D         Deficiency            (L70.9) Acne, unspecified acne type  Comment: controlled  Plan: minocycline (MINOCIN/DYNACIN) 100 MG capsule,         OFFICE/OUTPT VISIT,EST,LEVL III        I did go ahead and refill her minocycline today.  She is to continue routine skin care and other acne management.  We discussed a dermatology referral but the patient declined today.    (F33.2) Major depressive disorder, recurrent, severe without psychotic features (H)  Comment: stable  Plan: busPIRone (BUSPAR) 5 MG tablet, PARoxetine         (PAXIL) 40 MG tablet, OFFICE/OUTPT         VISIT,EST,LEVL III        Refills were given.  Continue healthy self cares, meds, etc.  Return to the clinic in 6 months for a med check/recheck. Anticipate a summer med check recheck, a physical in one year and additional refills, and if she continues mentally stable at that time I will approve yearly rechecks/refills.     (F41.1) Generalized anxiety disorder  Comment:   Plan: busPIRone (BUSPAR) 5 MG tablet, PARoxetine         (PAXIL) 40 MG tablet, OFFICE/OUTPT         VISIT,EST,LEVL III        As above.    (Z11.3) Screen for STD (sexually transmitted disease)  Comment: routine  Plan: Chlamydia trachomatis PCR, Neisseria         gonorrhoeae PCR, Hepatitis C antibody, HIV         Antigen Antibody Combo, Treponema Abs w Reflex         to RPR and Titer        Done today    (Z12.4) Screening for cervical cancer  Comment:   Plan: Pap imaged thin layer screen with HPV -         recommended age 30 - 65 years (select HPV order        below), HPV High Risk Types DNA Cervical        Done today      COUNSELING:  Reviewed preventive health counseling, as reflected in patient instructions    BP Readings from Last 1 Encounters:   03/01/19 128/82     Estimated body  "mass index is 34.07 kg/m  as calculated from the following:    Height as of this encounter: 1.626 m (5' 4\").    Weight as of this encounter: 90 kg (198 lb 8 oz).      Weight management plan: Discussed healthy diet and exercise guidelines     reports that  has never smoked. she has never used smokeless tobacco.      Counseling Resources:  ATP IV Guidelines  Pooled Cohorts Equation Calculator  Breast Cancer Risk Calculator  FRAX Risk Assessment  ICSI Preventive Guidelines  Dietary Guidelines for Americans, 2010  USDA's MyPlate  ASA Prophylaxis  Lung CA Screening    MARY Dias [facility]  "

## 2019-03-01 NOTE — LETTER
March 1, 2019      Hayde BERRIOS [place]  [address] [address] [address]        To Whom It May Concern,     Due to anxiety and depression, Hayde would benefit from additional lighting at her work space.      Sincerely,        MARY Dias CNP

## 2019-03-01 NOTE — PATIENT INSTRUCTIONS
MVI one per day.  Add vitamin D3 2000 I U's per day.         Preventive Health Recommendations  Female Ages 40 to 49    Yearly exam:     See your health care provider every year in order to  1. Review health changes.   2. Discuss preventive care.    3. Review your medicines if your doctor prescribed any.      Get a Pap test every three years (unless you have an abnormal result and your provider advises testing more often).      If you get Pap tests with HPV test, you only need to test every 5 years, unless you have an abnormal result. You do not need a Pap test if your uterus was removed (hysterectomy) and you have not had cancer.      You should be tested each year for STDs (sexually transmitted diseases), if you're at risk.     Ask your doctor if you should have a mammogram.      Have a colonoscopy (test for colon cancer) if someone in your family has had colon cancer or polyps before age 50.       Have a cholesterol test every 5 years.       Have a diabetes test (fasting glucose) after age 45. If you are at risk for diabetes, you should have this test every 3 years.    Shots: Get a flu shot each year. Get a tetanus shot every 10 years.     Nutrition:     Eat at least 5 servings of fruits and vegetables each day.    Eat whole-grain bread, whole-wheat pasta and brown rice instead of white grains and rice.    Get adequate Calcium and Vitamin D.      Lifestyle    Exercise at least 150 minutes a week (an average of 30 minutes a day, 5 days a week). This will help you control your weight and prevent disease.    Limit alcohol to one drink per day.    No smoking.     Wear sunscreen to prevent skin cancer.    See your dentist every six months for an exam and cleaning.

## 2019-03-02 LAB — T PALLIDUM AB SER QL: NONREACTIVE

## 2019-03-03 LAB
C TRACH DNA SPEC QL NAA+PROBE: NEGATIVE
N GONORRHOEA DNA SPEC QL NAA+PROBE: NEGATIVE
SPECIMEN SOURCE: NORMAL
SPECIMEN SOURCE: NORMAL

## 2019-03-04 LAB
DEPRECATED CALCIDIOL+CALCIFEROL SERPL-MC: 15 UG/L (ref 20–75)
HCV AB SERPL QL IA: NONREACTIVE
HIV 1+2 AB+HIV1 P24 AG SERPL QL IA: NONREACTIVE

## 2019-03-05 ENCOUNTER — MYC MEDICAL ADVICE (OUTPATIENT)
Dept: FAMILY MEDICINE | Facility: CLINIC | Age: 49
End: 2019-03-05

## 2019-03-05 LAB
COPATH REPORT: NORMAL
PAP: NORMAL

## 2019-03-05 NOTE — RESULT ENCOUNTER NOTE
Vinayak Lock,    This note is to let you know the results of your recent lab studies.  Your STD panel was negative.    Your blood count and blood sugar are normal.    Your cholesterol panel is high.  Your bad fat of LDL cholesterol and triglycerides are above the normal limit and your total cholesterol is also high.  I would definitely recommend a low-fat diet, regular aerobic activity, maintaining healthy weight etc.  Please message me back with your thoughts regarding your cholesterol and going on a cholesterol medication.     Daisy HERNANDEZ CNP

## 2019-03-05 NOTE — TELEPHONE ENCOUNTER
Daisy Almonte, APRN CNP    Patient inquiring via my chart if she can return in 6 months rather than the 2 months you had suggested on result note     Please advise     Thank you,   Teresa Denney, Registered Nurse   [facility]

## 2019-03-07 LAB
FINAL DIAGNOSIS: NORMAL
HPV HR 12 DNA CVX QL NAA+PROBE: NEGATIVE
HPV16 DNA SPEC QL NAA+PROBE: NEGATIVE
HPV18 DNA SPEC QL NAA+PROBE: NEGATIVE
SPECIMEN DESCRIPTION: NORMAL
SPECIMEN SOURCE CVX/VAG CYTO: NORMAL

## 2019-06-11 ENCOUNTER — MYC REFILL (OUTPATIENT)
Dept: FAMILY MEDICINE | Facility: CLINIC | Age: 49
End: 2019-06-11

## 2019-06-11 DIAGNOSIS — F33.2 MAJOR DEPRESSIVE DISORDER, RECURRENT, SEVERE WITHOUT PSYCHOTIC FEATURES (H): ICD-10-CM

## 2019-06-11 DIAGNOSIS — J30.2 SEASONAL ALLERGIC RHINITIS: ICD-10-CM

## 2019-06-11 DIAGNOSIS — F41.1 GENERALIZED ANXIETY DISORDER: ICD-10-CM

## 2019-06-11 RX ORDER — FLUTICASONE PROPIONATE 50 MCG
2 SPRAY, SUSPENSION (ML) NASAL DAILY
Qty: 48 G | Refills: 1 | Status: CANCELLED | OUTPATIENT
Start: 2019-06-11

## 2019-06-11 NOTE — TELEPHONE ENCOUNTER
"Requested Prescriptions   Pending Prescriptions Disp Refills     fluticasone (FLONASE) 50 MCG/ACT nasal spray  Last Written Prescription Date:  5/15/2018  Last Fill Quantity: 48 g,  # refills: 1   Last office visit: 6/20/2018 with prescribing provider:  Kyleigh     Future Office Visit:       48 g 1     Sig: Spray 2 sprays into both nostrils daily       Inhaled Steroids Protocol Passed - 6/11/2019 12:17 PM        Passed - Patient is age 12 or older        Passed - Recent (12 mo) or future (30 days) visit within the authorizing provider's specialty     Patient had office visit in the last 12 months or has a visit in the next 30 days with authorizing provider or within the authorizing provider's specialty.  See \"Patient Info\" tab in inbasket, or \"Choose Columns\" in Meds & Orders section of the refill encounter.              Passed - Medication is active on med list        "

## 2019-06-11 NOTE — TELEPHONE ENCOUNTER
Routing to [place], patient has established care with new provider?    ELIE VelasquezN, RN  [employer] [facility]

## 2019-06-12 NOTE — TELEPHONE ENCOUNTER
This is an over the counter medication and does not require a prescription.  If she needs a presription, please ask her to do an evisit with me.    Thank you.

## 2019-06-12 NOTE — TELEPHONE ENCOUNTER
Daisy Almonte CNP,  Please review/sign or advise for refill request of: fluticasone (FLONASE) 50 MCG/ACT nasal spray     Routing refill request to provider for review/approval because:  Medication has not been prescribed by provider in the clinic    Thank You!  Angeles Sharp, RN  Triage Nurse

## 2019-06-14 RX ORDER — BUSPIRONE HYDROCHLORIDE 5 MG/1
TABLET ORAL
Qty: 0.1 TABLET | Refills: 0 | OUTPATIENT
Start: 2019-06-14

## 2019-06-14 RX ORDER — PAROXETINE 40 MG/1
TABLET, FILM COATED ORAL
Qty: 0.1 TABLET | Refills: 0 | OUTPATIENT
Start: 2019-06-14

## 2019-06-14 NOTE — TELEPHONE ENCOUNTER
Duplicate see script sent PARoxetine (PAXIL) 40 MG tablet # 90 tablet x 1 refills she should have enough refills until she is due in for a follow up 9/2019.  Duplicate see script sent 3/1/2019 busPIRone (BUSPAR) 5 MG tablet # 90 tablet x 1 refill she should have refills until she is due in 9/2019

## 2019-09-03 DIAGNOSIS — F41.1 GENERALIZED ANXIETY DISORDER: ICD-10-CM

## 2019-09-03 DIAGNOSIS — F33.2 MAJOR DEPRESSIVE DISORDER, RECURRENT, SEVERE WITHOUT PSYCHOTIC FEATURES (H): ICD-10-CM

## 2019-09-03 NOTE — TELEPHONE ENCOUNTER
"Requested Prescriptions   Pending Prescriptions Disp Refills     PARoxetine (PAXIL) 40 MG tablet [Pharmacy Med Name: PAROXETINE HCL 40MG TABS]  Last Written Prescription Date:  3-1-19  Last Fill Quantity: 90 tab,  # refills: 1   Last office visit: 3/1/2019 with prescribing provider:  Daisy Almonte Office Visit:    90 tablet 1     Sig: TAKE ONE TABLET BY MOUTH EVERY DAY       SSRIs Protocol Failed - 9/3/2019  4:27 AM        Failed - PHQ-9 score less than 5 in past 6 months     Please review last PHQ-9 score.   PHQ-9 SCORE 12/21/2016 10/20/2017 6/20/2018   PHQ-9 Total Score - - -   PHQ-9 Total Score MyChart - - -   PHQ-9 Total Score 8 0 5     CARLOS-7 SCORE 12/21/2016 10/20/2017 6/20/2018   Total Score - - -   Total Score 15 6 8           Failed - Recent (6 mo) or future (30 days) visit within the authorizing provider's specialty     Patient had office visit in the last 6 months or has a visit in the next 30 days with authorizing provider or within the authorizing provider's specialty.  See \"Patient Info\" tab in inbasket, or \"Choose Columns\" in Meds & Orders section of the refill encounter.            Passed - Medication is active on med list        Passed - Patient is age 18 or older        Passed - No active pregnancy on record        Passed - No positive pregnancy test in last 12 months     ____________________________________         busPIRone (BUSPAR) 5 MG tablet [Pharmacy Med Name: BUSPIRONE HCL 5MG TABS]  Last Written Prescription Date:  3-1-19  Last Fill Quantity: 90 tab,  # refills: 1   Last office visit: 3/1/2019 with prescribing provider:  Daisy Almonte    Future Office Visit:    90 tablet 1     Sig: TAKE ONE TABLET BY MOUTH EVERY DAY       Atypical Antidepressants Protocol Failed - 9/3/2019  4:27 AM        Failed - Patient has PHQ-9 score less than 5 in past 6 months.     Please review last PHQ-9 score.   PHQ-9 SCORE 12/21/2016 10/20/2017 6/20/2018   PHQ-9 Total Score - - -   PHQ-9 " "Total Score MyChart - - -   PHQ-9 Total Score 8 0 5     CARLOS-7 SCORE 12/21/2016 10/20/2017 6/20/2018   Total Score - - -   Total Score 15 6 8           Failed - Recent (6 mo) or future (30 days) visit within the authorizing provider's specialty     Patient had office visit in the last 6 months or has a visit in the next 30 days with authorizing provider or within the authorizing provider's specialty.  See \"Patient Info\" tab in inbasket, or \"Choose Columns\" in Meds & Orders section of the refill encounter.            Passed - Medication active on med list        Passed - Patient is age 18 or older        Passed - No active pregnancy on record        Passed - No positive pregnancy test in past 12 mos         "

## 2019-09-03 NOTE — LETTER
[address] [address]  Phone: [phone]    09/06/19    Hayde Monique  [address] [place] [address]      To whom it may concern:     In order to ensure we are providing the best quality care, we have reviewed your chart and see that you are due for a follow-up appointment for further refills.    We have also sent your medications to your pharmacy. For future medication refills, please contact your primary care clinic to schedule the above appointment. This can be requested via [employer] or by calling the clinic at [phone].    We greatly appreciate the opportunity to serve you. Thank you for trusting us with your health care.      Sincerely,    Your care team at [facility]

## 2019-09-05 NOTE — TELEPHONE ENCOUNTER
Tried to contact pt but her mailbox was full.   Sent pt a [employer] message.     Georgina RIVAS     [facility]

## 2019-09-05 NOTE — TELEPHONE ENCOUNTER
Routing refill request to provider for review/approval because:  --Overdue for PHQ-9 - last done 6/20/18.  --Due for office visit per plan in last office visit 3/1/19 and per RN protocol for this medication.     ,  --Please contact patient and ask her to schedule a f/u office visit with Gage as planned in last ov.    --A refill request for below medication was sent to the provider.

## 2019-09-06 RX ORDER — PAROXETINE 40 MG/1
TABLET, FILM COATED ORAL
Qty: 30 TABLET | Refills: 0 | Status: SHIPPED | OUTPATIENT
Start: 2019-09-06 | End: 2019-10-01

## 2019-09-06 RX ORDER — BUSPIRONE HYDROCHLORIDE 5 MG/1
TABLET ORAL
Qty: 30 TABLET | Refills: 0 | Status: SHIPPED | OUTPATIENT
Start: 2019-09-06 | End: 2019-10-01

## 2019-10-01 DIAGNOSIS — F41.1 GENERALIZED ANXIETY DISORDER: ICD-10-CM

## 2019-10-01 DIAGNOSIS — F33.2 MAJOR DEPRESSIVE DISORDER, RECURRENT, SEVERE WITHOUT PSYCHOTIC FEATURES (H): ICD-10-CM

## 2019-10-04 NOTE — TELEPHONE ENCOUNTER
Routing refill request to provider for review/approval because:  Pended bridge to appointment

## 2019-10-04 NOTE — TELEPHONE ENCOUNTER
Please call pt and see if she can come in for a follow up appointment for med refills  Thanks!     Brissa Bolden RN

## 2019-10-04 NOTE — TELEPHONE ENCOUNTER
The patient has an appointment 10/15 but will run out of her medication 10/8.  Please fill enough until her appointment 10/15.

## 2019-10-05 RX ORDER — PAROXETINE 40 MG/1
TABLET, FILM COATED ORAL
Qty: 15 TABLET | Refills: 0 | Status: SHIPPED | OUTPATIENT
Start: 2019-10-05 | End: 2019-10-15

## 2019-10-05 RX ORDER — BUSPIRONE HYDROCHLORIDE 5 MG/1
TABLET ORAL
Qty: 15 TABLET | Refills: 0 | Status: SHIPPED | OUTPATIENT
Start: 2019-10-05 | End: 2019-10-15

## 2019-10-15 ENCOUNTER — OFFICE VISIT (OUTPATIENT)
Dept: FAMILY MEDICINE | Facility: CLINIC | Age: 49
End: 2019-10-15
Payer: COMMERCIAL

## 2019-10-15 VITALS
SYSTOLIC BLOOD PRESSURE: 126 MMHG | TEMPERATURE: 97.7 F | DIASTOLIC BLOOD PRESSURE: 78 MMHG | HEART RATE: 82 BPM | WEIGHT: 207 LBS | BODY MASS INDEX: 35.34 KG/M2 | HEIGHT: 64 IN | RESPIRATION RATE: 16 BRPM | OXYGEN SATURATION: 98 %

## 2019-10-15 DIAGNOSIS — N95.1 PERIMENOPAUSE: ICD-10-CM

## 2019-10-15 DIAGNOSIS — F41.1 GENERALIZED ANXIETY DISORDER: ICD-10-CM

## 2019-10-15 DIAGNOSIS — L70.9 ACNE, UNSPECIFIED ACNE TYPE: ICD-10-CM

## 2019-10-15 DIAGNOSIS — E66.9 OBESITY (BMI 35.0-39.9 WITHOUT COMORBIDITY): ICD-10-CM

## 2019-10-15 DIAGNOSIS — F33.2 MAJOR DEPRESSIVE DISORDER, RECURRENT, SEVERE WITHOUT PSYCHOTIC FEATURES (H): Primary | ICD-10-CM

## 2019-10-15 DIAGNOSIS — Z12.39 SCREENING FOR BREAST CANCER: ICD-10-CM

## 2019-10-15 PROCEDURE — 99214 OFFICE O/P EST MOD 30 MIN: CPT | Performed by: NURSE PRACTITIONER

## 2019-10-15 RX ORDER — MINOCYCLINE HYDROCHLORIDE 100 MG/1
CAPSULE ORAL
Qty: 180 CAPSULE | Refills: 1 | Status: SHIPPED | OUTPATIENT
Start: 2019-10-15 | End: 2020-10-19

## 2019-10-15 RX ORDER — PAROXETINE 40 MG/1
TABLET, FILM COATED ORAL
Qty: 90 TABLET | Refills: 3 | Status: SHIPPED | OUTPATIENT
Start: 2019-10-15 | End: 2020-10-19

## 2019-10-15 RX ORDER — BUSPIRONE HYDROCHLORIDE 5 MG/1
TABLET ORAL
Qty: 90 TABLET | Refills: 3 | Status: SHIPPED | OUTPATIENT
Start: 2019-10-15 | End: 2020-10-19

## 2019-10-15 RX ORDER — MINOCYCLINE HYDROCHLORIDE 100 MG/1
CAPSULE ORAL
Qty: 180 CAPSULE | Refills: 1 | Status: SHIPPED | OUTPATIENT
Start: 2019-10-15 | End: 2019-10-15

## 2019-10-15 ASSESSMENT — ANXIETY QUESTIONNAIRES
5. BEING SO RESTLESS THAT IT IS HARD TO SIT STILL: MORE THAN HALF THE DAYS
1. FEELING NERVOUS, ANXIOUS, OR ON EDGE: SEVERAL DAYS
6. BECOMING EASILY ANNOYED OR IRRITABLE: MORE THAN HALF THE DAYS
7. FEELING AFRAID AS IF SOMETHING AWFUL MIGHT HAPPEN: MORE THAN HALF THE DAYS
GAD7 TOTAL SCORE: 11
3. WORRYING TOO MUCH ABOUT DIFFERENT THINGS: SEVERAL DAYS
2. NOT BEING ABLE TO STOP OR CONTROL WORRYING: SEVERAL DAYS

## 2019-10-15 ASSESSMENT — PATIENT HEALTH QUESTIONNAIRE - PHQ9
SUM OF ALL RESPONSES TO PHQ QUESTIONS 1-9: 1
5. POOR APPETITE OR OVEREATING: MORE THAN HALF THE DAYS

## 2019-10-15 ASSESSMENT — MIFFLIN-ST. JEOR: SCORE: 1553.95

## 2019-10-15 NOTE — PROGRESS NOTES
"Chief Complaint   Patient presents with     Depression     and anxiety. needs paxil and buspar refills     Derm Problem     face     Menopausal Sx     hot flashes, periods are irregular, hot flashes x 4 months     Subjective     Hayde Monique is a 49 year old female who presents to clinic today for the following health issues:    Anxiety/depression:  Hayde has been on buspar and paxil for several years.  It is working well.  She denies any side effects.  She goes to therapy and it is helping.  She is requesting refills of her medications today.     Periods:  Her periods have been regular overall.  InJune she had a period and then she did not get her next period until the end of September.   Some hot flashes.  Vasectomy for birth control.  She is wondering if this is menopause.    Acne:  She has been on minocycline \"for a long time now.\"  She still take the minocycline as prescribed but she has breakthrough acne.  Previously seen by a dermatologist.  She is requesting refills on her minocycline.    Reviewed and updated as needed this visit by Provider  Tobacco  Allergies  Meds  Problems  Med Hx  Surg Hx  Fam Hx         Review of Systems   ROS COMP: Constitutional, HEENT, cardiovascular, pulmonary, GI, , musculoskeletal, neuro, skin, endocrine and psych systems are negative, except as otherwise noted.      Objective    /78 (BP Location: Left arm, Patient Position: Sitting, Cuff Size: Adult Large)   Pulse 82   Temp 97.7  F (36.5  C) (Oral)   Resp 16   Ht 1.626 m (5' 4\")   Wt 93.9 kg (207 lb)   LMP 10/01/2019 (Exact Date)   SpO2 98%   BMI 35.53 kg/m    Body mass index is 35.53 kg/m .  Physical Exam   GENERAL: healthy, alert and no distress  EYES: Eyes grossly normal to inspection, PERRL and conjunctivae and sclerae normal  NECK: no adenopathy and thyroid normal to palpation  RESP: lungs clear to auscultation - no rales, rhonchi or wheezes  CV: regular rate and rhythm, normal S1 S2, no S3 or S4, " no murmur, click or rub, no peripheral edema and peripheral pulses strong  MS: no gross musculoskeletal defects noted, no edema. Ambulatory with a steady gait.   SKIN: warm and dry.  She has a few scattered open and closed comedones on her face as well as a few cystic acne noted.  NEURO: Normal strength and tone, mentation intact and speech normal  PSYCH: mentation appears normal, affect normal/bright      Diagnostic Test Results:  Labs reviewed in Epic        Assessment & Plan     (F33.2) Major depressive disorder, recurrent, severe without psychotic features (H)  (primary encounter diagnosis)  Comment: stable  Plan: busPIRone (BUSPAR) 5 MG tablet, PARoxetine         (PAXIL) 40 MG tablet        Per Hayde, she has been feeling great on her paroxetine and buspar.  I did refil her medications today.  She is to continue to take her medications as prescribed.  I did give her 1 year of refills.  She is to return to the clinic in 1 year for her next recheck/refills, return sooner as needed.  I also encouraged her to continue therapy.    (F41.1) Generalized anxiety disorder  Comment: Improved  Plan: busPIRone (BUSPAR) 5 MG tablet, PARoxetine         (PAXIL) 40 MG tablet        As above    (L70.9) Acne, unspecified acne type  Comment: Uncertain  Plan: DERMATOLOGY REFERRAL, minocycline         (MINOCIN/DYNACIN) 100 MG capsule, DISCONTINUED:        minocycline (MINOCIN/DYNACIN) 100 MG capsule        I had a discussion that if she has been on the minocycline for a long time and she continues to have breakthrough acne, I would like her to see a dermatologist for additional treatment options.  I did briefly discuss Accutane but I told her dermatology would have additional treatment options.  For now, I did give her a refill of her minocycline which will bridge her to her dermatology appointment.  She is appreciative.    (N95.1) Perimenopause  Comment:   Plan: I had a long discussion with the patient today regarding her hormone  "changes, menopause, perimenopause etc.  Per definition, today she is perimenopausal.  I encouraged her to continue to keep track of her menstrual cycles and when she has gone a full year without her menstrual period, she is considered menopausal.  With her hot flashes, she will continue to eat healthy, get exercise, dress in layers etc.  She is to recheck in 1 year, sooner as needed.    (E66.9) Obesity (BMI 35.0-39.9 without comorbidity)  Comment:   Plan: Smaller portions, less carbs, more exercise and weight loss encouraged.    (Z12.39) Screening for breast cancer  Comment: Routine  Plan: MA Screening Digital Bilateral        Due now       Tobacco Cessation:   reports that she has been smoking cigarettes. She has been smoking about 0.00 packs per day for the past 1.00 year. She has never used smokeless tobacco.      BMI:   Estimated body mass index is 35.53 kg/m  as calculated from the following:    Height as of this encounter: 1.626 m (5' 4\").    Weight as of this encounter: 93.9 kg (207 lb).   Weight management plan: Discussed healthy diet and exercise guidelines      Return in about 1 year (around 10/15/2020) for Medication follow up, Physical Exam.    MARY Dias [facility]        "

## 2019-10-16 ASSESSMENT — ANXIETY QUESTIONNAIRES: GAD7 TOTAL SCORE: 11

## 2019-10-31 ENCOUNTER — TELEPHONE (OUTPATIENT)
Dept: FAMILY MEDICINE | Facility: CLINIC | Age: 49
End: 2019-10-31

## 2019-10-31 DIAGNOSIS — N64.4 BREAST PAIN, RIGHT: Primary | ICD-10-CM

## 2019-10-31 NOTE — TELEPHONE ENCOUNTER
Reason for Call:  Other call back    Detailed comments: Pt came into the clinic this afternoon and was going to get a Mammogram done, however, the pt said that she is suffering from right nipple sharp pain and tingling. Pt was not allowed to compelte her mammogram this evening. Please give pt a call back in order to assess. Pt has seen Kyleigh in the past. Thank you.    Phone Number Patient can be reached at: Home number on file [phone] (home)    Best Time:     Can we leave a detailed message on this number? YES    Call taken on 10/31/2019 at 4:39 PM by Lizzie Thornton

## 2019-11-01 NOTE — TELEPHONE ENCOUNTER
Tried to call pt but it rang and rang and then her mailbox was full. I will send my chart msg.Brissa Bolden RN

## 2019-11-03 ENCOUNTER — HOSPITAL ENCOUNTER (EMERGENCY)
Facility: CLINIC | Age: 49
Discharge: HOME | End: 2019-11-04
Attending: EMERGENCY MEDICINE | Admitting: EMERGENCY MEDICINE
Payer: COMMERCIAL

## 2019-11-03 DIAGNOSIS — R45.851 SUICIDAL IDEATION: ICD-10-CM

## 2019-11-03 DIAGNOSIS — N64.4 BREAST PAIN, RIGHT: ICD-10-CM

## 2019-11-03 DIAGNOSIS — F41.9 ANXIETY: ICD-10-CM

## 2019-11-03 LAB
ANION GAP SERPL CALCULATED.3IONS-SCNC: 7 MMOL/L (ref 3–14)
BASOPHILS # BLD AUTO: 0.1 10E9/L (ref 0–0.2)
BASOPHILS NFR BLD AUTO: 0.6 %
BUN SERPL-MCNC: 8 MG/DL (ref 7–30)
CALCIUM SERPL-MCNC: 8.6 MG/DL (ref 8.5–10.1)
CHLORIDE SERPL-SCNC: 109 MMOL/L (ref 94–109)
CO2 SERPL-SCNC: 24 MMOL/L (ref 20–32)
CREAT SERPL-MCNC: 0.54 MG/DL (ref 0.52–1.04)
DIFFERENTIAL METHOD BLD: NORMAL
EOSINOPHIL # BLD AUTO: 0.1 10E9/L (ref 0–0.7)
EOSINOPHIL NFR BLD AUTO: 0.9 %
ERYTHROCYTE [DISTWIDTH] IN BLOOD BY AUTOMATED COUNT: 12.8 % (ref 10–15)
GFR SERPL CREATININE-BSD FRML MDRD: >90 ML/MIN/{1.73_M2}
GLUCOSE SERPL-MCNC: 88 MG/DL (ref 70–99)
HCT VFR BLD AUTO: 38.6 % (ref 35–47)
HGB BLD-MCNC: 13 G/DL (ref 11.7–15.7)
IMM GRANULOCYTES # BLD: 0.1 10E9/L (ref 0–0.4)
IMM GRANULOCYTES NFR BLD: 0.6 %
LYMPHOCYTES # BLD AUTO: 2 10E9/L (ref 0.8–5.3)
LYMPHOCYTES NFR BLD AUTO: 18.8 %
MCH RBC QN AUTO: 31.4 PG (ref 26.5–33)
MCHC RBC AUTO-ENTMCNC: 33.7 G/DL (ref 31.5–36.5)
MCV RBC AUTO: 93 FL (ref 78–100)
MONOCYTES # BLD AUTO: 0.6 10E9/L (ref 0–1.3)
MONOCYTES NFR BLD AUTO: 5.2 %
NEUTROPHILS # BLD AUTO: 7.9 10E9/L (ref 1.6–8.3)
NEUTROPHILS NFR BLD AUTO: 73.9 %
NRBC # BLD AUTO: 0 10*3/UL
NRBC BLD AUTO-RTO: 0 /100
PLATELET # BLD AUTO: 235 10E9/L (ref 150–450)
POTASSIUM SERPL-SCNC: 3.5 MMOL/L (ref 3.4–5.3)
RBC # BLD AUTO: 4.14 10E12/L (ref 3.8–5.2)
SODIUM SERPL-SCNC: 140 MMOL/L (ref 133–144)
WBC # BLD AUTO: 10.7 10E9/L (ref 4–11)

## 2019-11-03 PROCEDURE — 90791 PSYCH DIAGNOSTIC EVALUATION: CPT

## 2019-11-03 PROCEDURE — 80048 BASIC METABOLIC PNL TOTAL CA: CPT | Performed by: EMERGENCY MEDICINE

## 2019-11-03 PROCEDURE — 85025 COMPLETE CBC W/AUTO DIFF WBC: CPT | Performed by: EMERGENCY MEDICINE

## 2019-11-03 PROCEDURE — 99285 EMERGENCY DEPT VISIT HI MDM: CPT | Mod: 25

## 2019-11-03 PROCEDURE — 80329 ANALGESICS NON-OPIOID 1 OR 2: CPT | Performed by: EMERGENCY MEDICINE

## 2019-11-03 PROCEDURE — 36415 COLL VENOUS BLD VENIPUNCTURE: CPT | Performed by: EMERGENCY MEDICINE

## 2019-11-03 PROCEDURE — 93005 ELECTROCARDIOGRAM TRACING: CPT

## 2019-11-03 RX ORDER — PAROXETINE 20 MG/1
40 TABLET, FILM COATED ORAL DAILY
Status: DISCONTINUED | OUTPATIENT
Start: 2019-11-04 | End: 2019-11-04 | Stop reason: HOSPADM

## 2019-11-03 RX ORDER — ACETAMINOPHEN 325 MG/1
650 TABLET ORAL EVERY 4 HOURS PRN
Status: DISCONTINUED | OUTPATIENT
Start: 2019-11-03 | End: 2019-11-04 | Stop reason: HOSPADM

## 2019-11-03 RX ORDER — BUSPIRONE HYDROCHLORIDE 5 MG/1
5 TABLET ORAL DAILY
Status: DISCONTINUED | OUTPATIENT
Start: 2019-11-04 | End: 2019-11-04 | Stop reason: HOSPADM

## 2019-11-03 ASSESSMENT — ENCOUNTER SYMPTOMS: NERVOUS/ANXIOUS: 1

## 2019-11-03 NOTE — ED AVS SNAPSHOT
[facility] Emergency Department  [address]  [facility] [address]  Phone:  [phone]  Fax:  [phone]                                    Hayde Monique   MRN: [id number]    Department:  [facility] Emergency Department   Date of Visit:  11/3/2019           After Visit Summary Signature Page    I have received my discharge instructions, and my questions have been answered. I have discussed any challenges I see with this plan with the nurse or doctor.    ..........................................................................................................................................  Patient/Patient Representative Signature      ..........................................................................................................................................  Patient Representative Print Name and Relationship to Patient    ..................................................               ................................................  Date                                   Time    ..........................................................................................................................................  Reviewed by Signature/Title    ...................................................              ..............................................  Date                                               Time          22EPIC Rev 08/18

## 2019-11-04 VITALS
SYSTOLIC BLOOD PRESSURE: 164 MMHG | WEIGHT: 184 LBS | HEART RATE: 87 BPM | DIASTOLIC BLOOD PRESSURE: 101 MMHG | OXYGEN SATURATION: 97 % | RESPIRATION RATE: 16 BRPM | TEMPERATURE: 98 F | BODY MASS INDEX: 31.58 KG/M2

## 2019-11-04 LAB
APAP SERPL-MCNC: <2 MG/L (ref 10–20)
INTERPRETATION ECG - MUSE: NORMAL

## 2019-11-04 PROCEDURE — 25000132 ZZH RX MED GY IP 250 OP 250 PS 637: Performed by: EMERGENCY MEDICINE

## 2019-11-04 RX ORDER — MULTIPLE VITAMINS W/ MINERALS TAB 9MG-400MCG
1 TAB ORAL DAILY
COMMUNITY
End: 2021-05-07

## 2019-11-04 RX ORDER — FLUTICASONE PROPIONATE 50 MCG
2 SPRAY, SUSPENSION (ML) NASAL DAILY PRN
COMMUNITY
End: 2022-10-21

## 2019-11-04 RX ADMIN — ACETAMINOPHEN 650 MG: 325 TABLET, FILM COATED ORAL at 11:30

## 2019-11-04 RX ADMIN — BUSPIRONE HYDROCHLORIDE 5 MG: 5 TABLET ORAL at 07:13

## 2019-11-04 RX ADMIN — ACETAMINOPHEN 650 MG: 325 TABLET, FILM COATED ORAL at 07:13

## 2019-11-04 RX ADMIN — PAROXETINE HYDROCHLORIDE 40 MG: 20 TABLET, FILM COATED ORAL at 07:13

## 2019-11-04 NOTE — ED PROVIDER NOTES
[facility] ED Behavioral Health Handoff Note:       Brief HPI:  This is a 49 year old female signed out to me by Dr. De Santiago.  See initial ED Provider note for details of the presentation.       Hold Status:  Active Orders   Legal    Legal Status: JOSEE - Health Officer Authority to Detain     Frequency: Effective Now     Start Date/Time: 11/03/19 2211      Number of Occurrences: Until Specified       The patient has not required medication for agitation.      Exam:   Temp:  [97.8  F (36.6  C)-98  F (36.7  C)] 98  F (36.7  C)  Pulse:  [] 87  Resp:  [16-18] 16  BP: (136-179)/() 136/109  SpO2:  [99 %] 99 %  Patient sitting cross-legged on ED stretcher. Normal affect. Calm and cooperative. Appropriate. Exhibits goal directed and future oriented thinking. No psychomotor agitation, evidence of psychosis. Patient denies any active SI or thoughts of self harm. Respirations are even and unlabored. Speech is clear and fluent.    ED Course:    Medications   acetaminophen (TYLENOL) tablet 650 mg (650 mg Oral Given 11/4/19 1130)   melatonin tablet 3 mg (has no administration in time range)   busPIRone (BUSPAR) tablet 5 mg (5 mg Oral Given 11/4/19 0713)   PARoxetine (PAXIL) tablet 40 mg (40 mg Oral Given 11/4/19 0713)       There were no significant events while under my care.      The patient reported feeling improved. Her father presented to the ED. The patient denied any ongoing thoughts of SI or self harm. The patient's father feels that it is safe for the patient to discharge home. The patient was re-evaluated by DEC who agrees that the patient is appropriate for discharge and close outpatient follow up. A therapy appointment was set up for same day at 6pm which the patient reported she is wanting and able to attend. Patient no longer expressing any SI. At this time it is appropriate for discharge to home and close outpatient follow up. She should call 911 or return to the hospital immediately for any recurrent SI or  thoughts of self harm. Patient able to contract for safety.       Impression:    ICD-10-CM    1. Suicidal ideation R45.851 Acetaminophen level     Acetaminophen level   2. Anxiety F41.9        Discharge:    Discharge to home with father.      RESULTS:   Results for orders placed or performed during the hospital encounter of 11/03/19 (from the past 24 hour(s))   EKG 12-lead, tracing only     Status: None    Collection Time: 11/03/19 10:15 PM   Result Value Ref Range    Interpretation ECG Click View Image link to view waveform and result    Basic metabolic panel     Status: None    Collection Time: 11/03/19 10:30 PM   Result Value Ref Range    Sodium 140 133 - 144 mmol/L    Potassium 3.5 3.4 - 5.3 mmol/L    Chloride 109 94 - 109 mmol/L    Carbon Dioxide 24 20 - 32 mmol/L    Anion Gap 7 3 - 14 mmol/L    Glucose 88 70 - 99 mg/dL    Urea Nitrogen 8 7 - 30 mg/dL    Creatinine 0.54 0.52 - 1.04 mg/dL    GFR Estimate >90 >60 mL/min/[1.73_m2]    GFR Estimate If Black >90 >60 mL/min/[1.73_m2]    Calcium 8.6 8.5 - 10.1 mg/dL   CBC with platelets differential     Status: None    Collection Time: 11/03/19 10:30 PM   Result Value Ref Range    WBC 10.7 4.0 - 11.0 10e9/L    RBC Count 4.14 3.8 - 5.2 10e12/L    Hemoglobin 13.0 11.7 - 15.7 g/dL    Hematocrit 38.6 35.0 - 47.0 %    MCV 93 78 - 100 fl    MCH 31.4 26.5 - 33.0 pg    MCHC 33.7 31.5 - 36.5 g/dL    RDW 12.8 10.0 - 15.0 %    Platelet Count 235 150 - 450 10e9/L    Diff Method Automated Method     % Neutrophils 73.9 %    % Lymphocytes 18.8 %    % Monocytes 5.2 %    % Eosinophils 0.9 %    % Basophils 0.6 %    % Immature Granulocytes 0.6 %    Nucleated RBCs 0 0 /100    Absolute Neutrophil 7.9 1.6 - 8.3 10e9/L    Absolute Lymphocytes 2.0 0.8 - 5.3 10e9/L    Absolute Monocytes 0.6 0.0 - 1.3 10e9/L    Absolute Eosinophils 0.1 0.0 - 0.7 10e9/L    Absolute Basophils 0.1 0.0 - 0.2 10e9/L    Abs Immature Granulocytes 0.1 0 - 0.4 10e9/L    Absolute Nucleated RBC 0.0    Acetaminophen level      Status: None    Collection Time: 11/03/19 10:30 PM   Result Value Ref Range    Acetaminophen Level <2 mg/L             MD Edil Sheth, Yaniv Byrnes MD  11/04/19 3984

## 2019-11-04 NOTE — ED NOTES
Explained to pt that she would be discharged and if her dad was able to come back to pick her up that would be great. Pt stated that her dad is already [facility] home but her  could pick her up at 3, or if she was getting discharged soon, then she could try her daughter to see if she can pick her up. Writer explained that discharge would be completed shortly and as long as she has a ride she can go at anytime. Pt verbalized understanding and stated she would call her daughter to see if she could come pick her up shortly.

## 2019-11-04 NOTE — ED NOTES
Patient escorted to bathroom. Obtained set of VS. Patient calm, cooperative and tearful at bedside. Plan reviewed with patient. She agrees with the plan. Gave coffee and water.

## 2019-11-04 NOTE — ED PROVIDER NOTES
History     Chief Complaint:  Anxiety and Suicidal    The history is provided by the patient.      Hayde Monique is a 49 year old female with a history of depression who presents to the emergency department today for evaluation of anxiety and suicidal ideation. The patient reports she has felt very overwhelmed and has had thoughts of driving her car off the road. She has been under increased stress at home, as her son is struggling with depression and her  is struggling with alcohol use and recently lost his job. The patient works full time, and feels she spends all her time helping her family and has no time for herself. She says she spent all weekend in bed, as she was too exhausted to deal with things. The patient is on medications, and adds that she has spoken with her doctor regarding her depression and anxiety. The patient admits she has overdosed on sleeping pills in an attempt to harm herself in the past, but she denies having done anything to harm herself in the recent past.    Allergies:  No Known Drug Allergies     Medications:    Buspirone  Paroxetine  Minocycline    Past Medical History:    Depression  OCD  Generalized anxiety disorder   Low grade squamous intraepithelial lesion on pap smear  Dermatofibroma  Acetaminophen overdose    Past Surgical History:     section  Breast augmentation  Arthroscopy, right shoulder  Ear tube placement  Open reduction internal fixation, right lower leg rodding  Wrist surgery, left wrist with cyst excision    Family History:    Cancer- lung  Anxiety disorder  Rheumatoid arthritis  Depression  Anxiety     Social History:  The patient was unaccompanied to the ED.  Smoking Status: Positive, 1 PPD  Smokeless Tobacco: Never Used  Alcohol Use: Positive  Drug Use: Negative    Marital Status:       Review of Systems   Psychiatric/Behavioral: Positive for suicidal ideas. Negative for self-injury. The patient is nervous/anxious.         Depressed   All  other systems reviewed and are negative.      Physical Exam     Patient Vitals for the past 24 hrs:   BP Temp Temp src Pulse Resp SpO2   11/03/19 2150 (!) 179/128 97.8  F (36.6  C) Temporal 117 18 99 %      Physical Exam  General: Patient is alert, awake and interactive when I enter the room  Head: The scalp, face, and head appear normal  Eyes: Conjunctivae are normal  ENT: The nose is normal, Pinnae are normal, External acoustic canals are normal  Neck: Trachea midline  CV: Pulses are normal.   Resp: No respiratory distress   Musc: Normal muscular tone, moving all extremities.  Skin: No rash or lesions noted  Neuro:  Speech is normal and fluent. Face is symmetric.   Psych: tearful, depressed affect. Suicidal ideations with a plan       Emergency Department Course     ECG:  ECG taken at 2215, ECG read at 2322  Normal sinus rhythm  Normal ECG   Rate 91 bpm. MD interval 138. QRS duration 84. QT/QTc 390/479. P-R-T axes 48 74 45.     Laboratory:  Laboratory findings were communicated with the patient who voiced understanding of the findings.  CBC: AWNL (WBC 10.7, HGB 13.0, )  BMP: AWNL (Creatinine 0.54)     Acetaminophen Level: Pending    Emergency Department Course:  2152 Nursing notes and vitals reviewed.  2154 I performed an exam of the patient as documented above.   2215 An ECG was performed, results above.   2231 IV was inserted and blood was drawn for laboratory testing, results above.   2240 The patient was evaluated by a DEC  here in the emergency department.     The patient was signed out to the care of the oncoming emergency department physician, Dr. De Santiago.    I personally reviewed the laboratory results with the Patient and answered all related questions prior to transfer of care.      Impression & Plan      Medical Decision Making:  Patient is a 49-year-old woman who presents to the emergency department with anxiety and suicidal ideations.  She has not taken any actions to hurt herself this  evening but does admit to having a plan in which she would drive her car off the road.  I did obtain overdose labs that given the patient is tachycardic here today.  This does not show any acute signs of concerning overdose pathology based on the results.  The patient was evaluated by our psych associate who agrees with me that she requires inpatient psychological evaluation  And treatment.  I discussed my plan with the patient who is amenable at this time.  Patient will be signed out to my colleague Dr. De Santiago.       Diagnosis:    ICD-10-CM    1. Suicidal ideation R45.851 Acetaminophen level     Acetaminophen level     CANCELED: Acetaminophen level       Disposition:  Signed out to my partner, Dr. De Santiago.     Scribe Disclosure:  I, Paula Antonio, am serving as a scribe at 9:53 PM on 11/3/2019 to document services personally performed by Agustin Lutz based on my observations and the provider's statements to me.    11/3/2019   [facility] EMERGENCY DEPARTMENT       Agustin Lutz MD  11/03/19 1437

## 2019-11-04 NOTE — ED NOTES
Pt and her family member requesting to speak with MD. Pt's family member stated that if pt could be discharged home he would be able to give her a ride, but he has to leave shortly. Writer spoke to MD to who explained that we are waiting for DEC to reassess and they will determine if she could be discharged. This was relayed to pt and her family member her verbalized understanding. Pt cooperative throughout entire encounter. Pt requested some diet soda which was provided. MD also went to speak with pt at bedside.

## 2019-11-04 NOTE — PHARMACY-ADMISSION MEDICATION HISTORY
Admission medication history interview status for this patient is complete. See [facility] admission navigator for allergy information, prior to admission medications and immunization status.     Medication history interview source(s):Patient  Medication history resources (including written lists, pill bottles, clinic record):None  Primary pharmacy:    Changes made to PTA medication list:  Added: Vitamin D, MVI  Deleted:   Changed: Flonase to prn    Actions taken by pharmacist (provider contacted, etc):sticky note for provider     Additional medication history information:None    Medication reconciliation/reorder completed by provider prior to medication history?  No     Do you take OTC medications (eg tylenol, ibuprofen, fish oil, eye/ear drops, etc)? Yes     For patients on insulin therapy: No      Prior to Admission medications    Medication Sig Last Dose Taking? Auth Provider   busPIRone (BUSPAR) 5 MG tablet TAKE ONE TABLET BY MOUTH EVERY DAY Past Week at Unknown time Yes Daisy Almonte APRN CNP   fluticasone (FLONASE) 50 MCG/ACT nasal spray Spray 2 sprays into both nostrils daily as needed for rhinitis or allergies not recent Yes Unknown, Entered By History   minocycline (MINOCIN/DYNACIN) 100 MG capsule TAKE ONE CAPSULE BY MOUTH TWICE A DAY Past Week at Unknown time Yes Daisy Almonte APRN CNP   multivitamin w/minerals (MULTI-VITAMIN) tablet Take 1 tablet by mouth daily Past Week at Unknown time Yes Unknown, Entered By History   PARoxetine (PAXIL) 40 MG tablet TAKE ONE TABLET BY MOUTH EVERY DAY Past Week at Unknown time Yes Daisy Almonte APRN CNP   VITAMIN D, CHOLECALCIFEROL, PO Take by mouth daily Patient does not know strength  Yes Unknown, Entered By History

## 2019-11-04 NOTE — ED PROVIDER NOTES
[facility] ED Behavioral Health Handoff Note:       Brief HPI:  This is a 49 year old female signed out to me by Dr. Gamez .  See initial ED Provider note for details of the presentation.     Patient is medically cleared for admission to a Behavioral Health unit.          Hold Status:  Active Orders   Legal    Legal Status: JOSEE - Health Officer Authority to Detain     Frequency: Effective Now     Start Date/Time: 11/03/19 2211      Number of Occurrences: Until Specified       The patient has not required medication for agitation.      Exam:   Temp:  [97.8  F (36.6  C)] 97.8  F (36.6  C)  Pulse:  [117] 117  Resp:  [18] 18  BP: (179)/(128) 179/128  SpO2:  [99 %] 99 %      ED Course:    Medications - No data to display    There were no significant events while under my care.  Pt sleeping overnight.  Home meds ordered and meal tray ordered.    Patient was signed out to the oncoming provider. Dr. Solo        RESULTS:   Results for orders placed or performed during the hospital encounter of 11/03/19 (from the past 24 hour(s))   EKG 12-lead, tracing only     Status: None (Preliminary result)    Collection Time: 11/03/19 10:15 PM   Result Value Ref Range    Interpretation ECG Click View Image link to view waveform and result    Basic metabolic panel     Status: None    Collection Time: 11/03/19 10:30 PM   Result Value Ref Range    Sodium 140 133 - 144 mmol/L    Potassium 3.5 3.4 - 5.3 mmol/L    Chloride 109 94 - 109 mmol/L    Carbon Dioxide 24 20 - 32 mmol/L    Anion Gap 7 3 - 14 mmol/L    Glucose 88 70 - 99 mg/dL    Urea Nitrogen 8 7 - 30 mg/dL    Creatinine 0.54 0.52 - 1.04 mg/dL    GFR Estimate >90 >60 mL/min/[1.73_m2]    GFR Estimate If Black >90 >60 mL/min/[1.73_m2]    Calcium 8.6 8.5 - 10.1 mg/dL   CBC with platelets differential     Status: None    Collection Time: 11/03/19 10:30 PM   Result Value Ref Range    WBC 10.7 4.0 - 11.0 10e9/L    RBC Count 4.14 3.8 - 5.2 10e12/L    Hemoglobin 13.0 11.7 - 15.7 g/dL    Hematocrit  38.6 35.0 - 47.0 %    MCV 93 78 - 100 fl    MCH 31.4 26.5 - 33.0 pg    MCHC 33.7 31.5 - 36.5 g/dL    RDW 12.8 10.0 - 15.0 %    Platelet Count 235 150 - 450 10e9/L    Diff Method Automated Method     % Neutrophils 73.9 %    % Lymphocytes 18.8 %    % Monocytes 5.2 %    % Eosinophils 0.9 %    % Basophils 0.6 %    % Immature Granulocytes 0.6 %    Nucleated RBCs 0 0 /100    Absolute Neutrophil 7.9 1.6 - 8.3 10e9/L    Absolute Lymphocytes 2.0 0.8 - 5.3 10e9/L    Absolute Monocytes 0.6 0.0 - 1.3 10e9/L    Absolute Eosinophils 0.1 0.0 - 0.7 10e9/L    Absolute Basophils 0.1 0.0 - 0.2 10e9/L    Abs Immature Granulocytes 0.1 0 - 0.4 10e9/L    Absolute Nucleated RBC 0.0              MD Jonnathan Amaro Kristi Jo Schneider, MD  11/04/19 0933

## 2019-11-04 NOTE — ED NOTES
Patient resting comfortably in room. Patient given scheduled medications and PRN APAP for HA. Patient requesting to know the plan. Note left for MD regarding DEC consultation to better determine disposition. Meal tray ordered.

## 2019-11-04 NOTE — ED NOTES
Writer went to administer more tylenol for pt also asked if pt would like some lunch. Pt agreeable to both tylenol and lunch. Lunch ordered. Writer also informed pt that provider should be in shortly to speak with her at her request from earlier. Pt verbalized understanding. Pt also has visitor at bedside. Pt cooperative throughout entire encounter.

## 2019-11-04 NOTE — ED TRIAGE NOTES
Pt in with C/O anxiety and depression. Pt reports she has become overwhelmed caring for her family, in particular her son who suffers from depression. Pt endorces SI and reports she has been thinking about hurting herself this past weekend. Pt reports he plan was to drive her car into oncoming traffic or off a bridge. Pt tearful in triage, making poor eye contact. Pt Cooperative.

## 2019-11-04 NOTE — ED NOTES
Pt had possessions returned to her that was being held by security. Pt's father will give her a ride home.

## 2019-11-04 NOTE — DISCHARGE INSTRUCTIONS
Discharge Instructions  Mental Health Concerns    You were seen today for mental health concerns, such as depression, anxiety, or suicidal thinking. Your provider feels that you do not require hospitalization at this time. However, your symptoms may become worse, and you may need to return to the Emergency Department. Most treatments of depression and suicidal thoughts are a process rather than a single intervention.  Medications and counseling can take several weeks or more to help.    Generally, every Emergency Department visit should have a follow-up clinic visit with either a primary or a specialty clinic/provider. Please follow-up as instructed by your emergency provider today.    By accepting these discharge instructions:  You promise to not harm yourself or others.  You agree that if you feel you are becoming unable to keep that promise, you will do something to help yourself before you do anything to harm yourself or others.   You agree to keep any safety plan arranged on your visit here today.  You agree to take any medication prescribed or recommended by your provider.  If you are getting worse, you can contact a friend or a family member, contact your counselor or family provider, contact a crisis line, or other options discussed with the provider or therapist today.  At any time, you can call 911 and return to the Emergency Department for more help.  You understand that follow-up is essential to your treatment, and you will make and keep appointments recommended on your visit today.    How to improve your mental health and prevent suicide:  Involve others by letting family, friends, counselors know.  Do not isolate yourself.  Avoid alcohol or drugs. Remove weapons, poisons from your home.  Try to stick to routines for eating, sleeping and getting regular exercise.    Try to get into sunlight. Bright natural light not only treats seasonal affective disorder but also depression.  Increase safe activities  that you enjoy.    If you feel worse, contact 1-800-suicide (1-581.400.4580), or call 911, or your primary provider/counselor for additional assistance.    If you were given a prescription for medicine here today, be sure to read all of the information (including the package insert) that comes with your prescription.  This will include important information about the medicine, its side effects, and any warnings that you need to know about.  The pharmacist who fills the prescription can provide more information and answer questions you may have about the medicine.  If you have questions or concerns that the pharmacist cannot address, please call or return to the Emergency Department.   Remember that you can always come back to the Emergency Department if you are not able to see your regular provider in the amount of time listed above, if you get any new symptoms, or if there is anything that worries you.

## 2019-11-08 ENCOUNTER — HEALTH MAINTENANCE LETTER (OUTPATIENT)
Age: 49
End: 2019-11-08

## 2019-11-15 ENCOUNTER — HOSPITAL ENCOUNTER (OUTPATIENT)
Dept: ULTRASOUND IMAGING | Facility: CLINIC | Age: 49
End: 2019-11-15
Attending: NURSE PRACTITIONER
Payer: COMMERCIAL

## 2019-11-15 ENCOUNTER — HOSPITAL ENCOUNTER (OUTPATIENT)
Dept: MAMMOGRAPHY | Facility: CLINIC | Age: 49
Discharge: HOME | End: 2019-11-15
Attending: NURSE PRACTITIONER | Admitting: NURSE PRACTITIONER
Payer: COMMERCIAL

## 2019-11-15 DIAGNOSIS — N64.4 BREAST PAIN, RIGHT: ICD-10-CM

## 2019-11-15 PROCEDURE — 77066 DX MAMMO INCL CAD BI: CPT

## 2019-11-15 PROCEDURE — 76642 ULTRASOUND BREAST LIMITED: CPT | Mod: RT

## 2019-11-15 PROCEDURE — G0279 TOMOSYNTHESIS, MAMMO: HCPCS

## 2020-05-04 ENCOUNTER — TELEPHONE (OUTPATIENT)
Dept: ORTHOPEDICS | Facility: CLINIC | Age: 50
End: 2020-05-04

## 2020-05-04 NOTE — TELEPHONE ENCOUNTER
Reason for call:  Linda would like an appt to get her elbow xrayed and checked.    Phone number to reach patient:  Cell number on file:    Telephone Information:   Mobile [phone]       Best Time:  any    Can we leave a detailed message on this number?  NO

## 2020-05-06 NOTE — TELEPHONE ENCOUNTER
Spoke with patient. She states that she saw a chiropractor and he thinks it is tennis elbow. She is going to continue treating with them. She declines an appointment at this time.     Katia Saunders M.Ed., LAT, ATC

## 2020-06-22 ENCOUNTER — MYC MEDICAL ADVICE (OUTPATIENT)
Dept: FAMILY MEDICINE | Facility: CLINIC | Age: 50
End: 2020-06-22

## 2020-06-23 ENCOUNTER — TRANSFERRED RECORDS (OUTPATIENT)
Dept: HEALTH INFORMATION MANAGEMENT | Facility: CLINIC | Age: 50
End: 2020-06-23

## 2020-06-23 ENCOUNTER — VIRTUAL VISIT (OUTPATIENT)
Dept: INTERNAL MEDICINE | Facility: CLINIC | Age: 50
End: 2020-06-23
Payer: COMMERCIAL

## 2020-06-23 DIAGNOSIS — R20.2 PARESTHESIAS: ICD-10-CM

## 2020-06-23 DIAGNOSIS — R20.2 PARESTHESIAS: Primary | ICD-10-CM

## 2020-06-23 LAB
ERYTHROCYTE [DISTWIDTH] IN BLOOD BY AUTOMATED COUNT: 12.6 % (ref 10–15)
ERYTHROCYTE [SEDIMENTATION RATE] IN BLOOD BY WESTERGREN METHOD: 9 MM/H (ref 0–20)
HCT VFR BLD AUTO: 39.3 % (ref 35–47)
HGB BLD-MCNC: 12.5 G/DL (ref 11.7–15.7)
MCH RBC QN AUTO: 30.3 PG (ref 26.5–33)
MCHC RBC AUTO-ENTMCNC: 31.8 G/DL (ref 31.5–36.5)
MCV RBC AUTO: 95 FL (ref 78–100)
PLATELET # BLD AUTO: 239 10E9/L (ref 150–450)
RBC # BLD AUTO: 4.13 10E12/L (ref 3.8–5.2)
VIT B12 SERPL-MCNC: 285 PG/ML (ref 193–986)
WBC # BLD AUTO: 7 10E9/L (ref 4–11)

## 2020-06-23 PROCEDURE — 84443 ASSAY THYROID STIM HORMONE: CPT | Performed by: INTERNAL MEDICINE

## 2020-06-23 PROCEDURE — 82306 VITAMIN D 25 HYDROXY: CPT | Performed by: INTERNAL MEDICINE

## 2020-06-23 PROCEDURE — 99213 OFFICE O/P EST LOW 20 MIN: CPT | Mod: TEL | Performed by: INTERNAL MEDICINE

## 2020-06-23 PROCEDURE — 80053 COMPREHEN METABOLIC PANEL: CPT | Performed by: INTERNAL MEDICINE

## 2020-06-23 PROCEDURE — 36415 COLL VENOUS BLD VENIPUNCTURE: CPT | Performed by: INTERNAL MEDICINE

## 2020-06-23 PROCEDURE — 82607 VITAMIN B-12: CPT | Performed by: INTERNAL MEDICINE

## 2020-06-23 PROCEDURE — 83735 ASSAY OF MAGNESIUM: CPT | Performed by: INTERNAL MEDICINE

## 2020-06-23 PROCEDURE — 85652 RBC SED RATE AUTOMATED: CPT | Performed by: INTERNAL MEDICINE

## 2020-06-23 PROCEDURE — 85027 COMPLETE CBC AUTOMATED: CPT | Performed by: INTERNAL MEDICINE

## 2020-06-23 NOTE — PROGRESS NOTES
"Hayde Monique is a 49 year old female who is being evaluated via a billable telephone visit.      The patient has been notified of following:     \"This telephone visit will be conducted via a call between you and your physician/provider. We have found that certain health care needs can be provided without the need for a physical exam.  This service lets us provide the care you need with a short phone conversation.  If a prescription is necessary we can send it directly to your pharmacy.  If lab work is needed we can place an order for that and you can then stop by our lab to have the test done at a later time.    Telephone visits are billed at different rates depending on your insurance coverage. During this emergency period, for some insurers they may be billed the same as an in-person visit.  Please reach out to your insurance provider with any questions.    If during the course of the call the physician/provider feels a telephone visit is not appropriate, you will not be charged for this service.\"    Patient has given verbal consent for Telephone visit?  Yes    What phone number would you like to be contacted at? [phone]    How would you like to obtain your AVS? MyChart    Subjective     Hayde Monique is a 49 year old female who presents via phone visit today for the following health issues:    HPI  Both legs numbness  X 6 years - went away for a while , now back   Only when laying down on one side or the other   Also pain in left lower back     Presents with paresthesias in the legs.   For 6 years. Symptoms occur at night, when laying on the side, develops numbness and tingling of the leg that she is laying on.   With change of  Position symptoms develop on the other leg. No legs weakness , no pain.   Noted poor balance, has fallen in the parking lot with no good reason.   Has had LBP, on the left lower side, radiating to the hip. Will be seeing ortho tomorrow.            Patient Active Problem List "   Diagnosis     CARDIOVASCULAR SCREENING; LDL GOAL LESS THAN 160     OCD (obsessive compulsive disorder)     Anxiety attack     Major depression     Generalized anxiety disorder     Melasma     Family history of breast cancer     Class 1 obesity due to excess calories without serious comorbidity with body mass index (BMI) of 32.0 to 32.9 in adult     Right shoulder pain     Aftercare following surgery of the musculoskeletal system     Acne, unspecified acne type     Perimenopause     Past Surgical History:   Procedure Laterality Date     ABDOMEN SURGERY           ARTHROSCOPY SHOULDER Right 2017    Procedure:  Right shoulder arthroscopy and biceps tenolysis.  Arthroscopic subacromial decompresson (acromioplasty, bursectomy and coracoacromial ligament resection). Arthroscopic distal clavicle resection.  Surgeon:  Luca Rodríguez MD  [facility]     BREAST SURGERY      implants       SECTION       COSMETIC SURGERY      Implants     ENT SURGERY      Tubes     orif leg Right     right lower leg, rodding     ORTHOPEDIC SURGERY      Broken Leg - chin and screws     WRIST SURGERY Left     Left wrist, cyst excision       Social History     Tobacco Use     Smoking status: Current Some Day Smoker     Packs/day: 0.00     Years: 1.00     Pack years: 0.00     Types: Cigarettes     Smokeless tobacco: Never Used   Substance Use Topics     Alcohol use: Not Currently     Alcohol/week: 0.0 standard drinks     Comment: three times per week about 2 glasses of wine     Family History   Problem Relation Age of Onset     Cancer Mother 55        Lung cancer     Other Cancer Mother         Lung     Depression Mother      Anxiety Disorder Mother      Rheumatoid Arthritis Sister      Breast Cancer Maternal Grandmother      Rheumatoid Arthritis Paternal Grandmother      Depression Sister      Anxiety Disorder Sister          Current Outpatient Medications   Medication Sig  Dispense Refill     busPIRone (BUSPAR) 5 MG tablet TAKE ONE TABLET BY MOUTH EVERY DAY 90 tablet 3     fluticasone (FLONASE) 50 MCG/ACT nasal spray Spray 2 sprays into both nostrils daily as needed for rhinitis or allergies       minocycline (MINOCIN/DYNACIN) 100 MG capsule TAKE ONE CAPSULE BY MOUTH TWICE A  capsule 1     multivitamin w/minerals (MULTI-VITAMIN) tablet Take 1 tablet by mouth daily       PARoxetine (PAXIL) 40 MG tablet TAKE ONE TABLET BY MOUTH EVERY DAY 90 tablet 3     VITAMIN D, CHOLECALCIFEROL, PO Take by mouth daily Patient does not know strength         Reviewed and updated as needed this visit by Provider         Review of Systems   Constitutional, HEENT, cardiovascular, pulmonary, gi and gu systems are negative, except as otherwise noted.       Objective   Normal speech and affect  Remainder of exam unable to be completed due to telephone visits    Diagnostic Test Results:  Labs reviewed in Epic        Assessment/Plan:  1. Paresthesias    - CBC with platelets; Future  - Comprehensive metabolic panel; Future  - TSH with free T4 reflex; Future  - Erythrocyte sedimentation rate auto; Future  - Vitamin D Deficiency; Future  - Vitamin B12; Future  - Magnesium; Future    Chronic symptoms, possible relate to position/ nerve compression/ lumbar radiculopathy/ peripheral neuropathy  Assess lab work  Will be seeing ortho for assessment of LBP, may need LS spine MRI     Phone call duration:  11 minutes    Jaquan Keenan MD

## 2020-06-24 ENCOUNTER — TRANSFERRED RECORDS (OUTPATIENT)
Dept: HEALTH INFORMATION MANAGEMENT | Facility: CLINIC | Age: 50
End: 2020-06-24

## 2020-06-24 LAB
ALBUMIN SERPL-MCNC: 4 G/DL (ref 3.4–5)
ALP SERPL-CCNC: 87 U/L (ref 40–150)
ALT SERPL W P-5'-P-CCNC: 50 U/L (ref 0–50)
ANION GAP SERPL CALCULATED.3IONS-SCNC: 7 MMOL/L (ref 3–14)
AST SERPL W P-5'-P-CCNC: 21 U/L (ref 0–45)
BILIRUB SERPL-MCNC: 0.5 MG/DL (ref 0.2–1.3)
BUN SERPL-MCNC: 13 MG/DL (ref 7–30)
CALCIUM SERPL-MCNC: 9.1 MG/DL (ref 8.5–10.1)
CHLORIDE SERPL-SCNC: 105 MMOL/L (ref 94–109)
CO2 SERPL-SCNC: 25 MMOL/L (ref 20–32)
CREAT SERPL-MCNC: 0.62 MG/DL (ref 0.52–1.04)
DEPRECATED CALCIDIOL+CALCIFEROL SERPL-MC: 47 UG/L (ref 20–75)
GFR SERPL CREATININE-BSD FRML MDRD: >90 ML/MIN/{1.73_M2}
GLUCOSE SERPL-MCNC: 87 MG/DL (ref 70–99)
MAGNESIUM SERPL-MCNC: 2.2 MG/DL (ref 1.6–2.3)
POTASSIUM SERPL-SCNC: 3.9 MMOL/L (ref 3.4–5.3)
PROT SERPL-MCNC: 7.4 G/DL (ref 6.8–8.8)
SODIUM SERPL-SCNC: 137 MMOL/L (ref 133–144)
TSH SERPL DL<=0.005 MIU/L-ACNC: 3.04 MU/L (ref 0.4–4)

## 2020-06-25 ENCOUNTER — MYC MEDICAL ADVICE (OUTPATIENT)
Dept: FAMILY MEDICINE | Facility: CLINIC | Age: 50
End: 2020-06-25

## 2020-06-25 NOTE — TELEPHONE ENCOUNTER
Please see ReSn[employer] message. Can we assist patient with scheduling injection with one of our providers at [place]? Thank you.  ELIE MeehanN, RN  [facility]

## 2020-06-26 NOTE — TELEPHONE ENCOUNTER
Response sent.  Also faxed request for pertinent records to [facility].  Nuvia Huffman

## 2020-07-01 ENCOUNTER — TRANSFERRED RECORDS (OUTPATIENT)
Dept: HEALTH INFORMATION MANAGEMENT | Facility: CLINIC | Age: 50
End: 2020-07-01

## 2020-07-09 ENCOUNTER — MYC MEDICAL ADVICE (OUTPATIENT)
Dept: FAMILY MEDICINE | Facility: CLINIC | Age: 50
End: 2020-07-09

## 2020-07-09 ENCOUNTER — MYC MEDICAL ADVICE (OUTPATIENT)
Dept: INTERNAL MEDICINE | Facility: CLINIC | Age: 50
End: 2020-07-09

## 2020-07-09 DIAGNOSIS — R20.2 PARESTHESIAS: Primary | ICD-10-CM

## 2020-07-10 ENCOUNTER — OFFICE VISIT (OUTPATIENT)
Dept: FAMILY MEDICINE | Facility: CLINIC | Age: 50
End: 2020-07-10
Payer: COMMERCIAL

## 2020-07-10 VITALS
WEIGHT: 217 LBS | BODY MASS INDEX: 37.05 KG/M2 | HEIGHT: 64 IN | OXYGEN SATURATION: 99 % | HEART RATE: 101 BPM | SYSTOLIC BLOOD PRESSURE: 124 MMHG | TEMPERATURE: 98.5 F | DIASTOLIC BLOOD PRESSURE: 84 MMHG

## 2020-07-10 DIAGNOSIS — M77.11 RIGHT LATERAL EPICONDYLITIS: Primary | ICD-10-CM

## 2020-07-10 PROCEDURE — 20605 DRAIN/INJ JOINT/BURSA W/O US: CPT | Mod: RT | Performed by: FAMILY MEDICINE

## 2020-07-10 PROCEDURE — 99213 OFFICE O/P EST LOW 20 MIN: CPT | Mod: 25 | Performed by: FAMILY MEDICINE

## 2020-07-10 RX ORDER — TRIAMCINOLONE ACETONIDE 40 MG/ML
12 INJECTION, SUSPENSION INTRA-ARTICULAR; INTRAMUSCULAR ONCE
Status: COMPLETED | OUTPATIENT
Start: 2020-07-10 | End: 2020-07-10

## 2020-07-10 RX ADMIN — TRIAMCINOLONE ACETONIDE 12 MG: 40 INJECTION, SUSPENSION INTRA-ARTICULAR; INTRAMUSCULAR at 15:30

## 2020-07-10 ASSESSMENT — ANXIETY QUESTIONNAIRES
6. BECOMING EASILY ANNOYED OR IRRITABLE: SEVERAL DAYS
5. BEING SO RESTLESS THAT IT IS HARD TO SIT STILL: NOT AT ALL
1. FEELING NERVOUS, ANXIOUS, OR ON EDGE: NOT AT ALL
IF YOU CHECKED OFF ANY PROBLEMS ON THIS QUESTIONNAIRE, HOW DIFFICULT HAVE THESE PROBLEMS MADE IT FOR YOU TO DO YOUR WORK, TAKE CARE OF THINGS AT HOME, OR GET ALONG WITH OTHER PEOPLE: NOT DIFFICULT AT ALL
7. FEELING AFRAID AS IF SOMETHING AWFUL MIGHT HAPPEN: NOT AT ALL
2. NOT BEING ABLE TO STOP OR CONTROL WORRYING: NOT AT ALL
GAD7 TOTAL SCORE: 1
3. WORRYING TOO MUCH ABOUT DIFFERENT THINGS: NOT AT ALL

## 2020-07-10 ASSESSMENT — MIFFLIN-ST. JEOR: SCORE: 1599.31

## 2020-07-10 ASSESSMENT — PATIENT HEALTH QUESTIONNAIRE - PHQ9
5. POOR APPETITE OR OVEREATING: NOT AT ALL
SUM OF ALL RESPONSES TO PHQ QUESTIONS 1-9: 1

## 2020-07-10 NOTE — PATIENT INSTRUCTIONS
Norma Schwarz          [email or web address]&shtk=HQNdszmvBAGjCx58GjPQFuBetNY3TNWQAIO6PDEkwBZfPPDeNNOvNS40HCKLhBUpM5ucKCCwLSVkX5HxLWAtdVXzVp%3D%3D&shdk=MxWnqE84ugFnVCa0n4npUZvnFDehgaJfsCT3lsSAl4VdA6QguaHdlFDdraGbDpPbNHNAPTlaBSLaCZAmFHGcmeGvhTuuFDSqx46eyEIqFASno0Tix4ZhAq08jkAynT8gydXgKLD2KRHxjRWnfiygGE6jNGN2nhJ6K4jzfdEvi1RbdLCtwkhpXGKyUt05CI7iNFxnpZBmZHgpISXlT00iRMlyhWPxiv5wHbDwJCZrxLLrpxCZa5VqYL0kUOGsFHZryvRmj07lwG3qTHF5OAV5RPMnlwCkKwFwkZJtpuawwcIyKLIxHQSnSFUdSZY0gLWlQkJenYLkbmF5YQQkiO7bX5G1VNQgRZJkPqT2c5AoYHSzuk5jsgZcbGNxzmKgXBhqKwGBSWafSAR1cqWwoZ2tdGluRSM9goImhiNYLWAjLz4bqnAezEPffewvM6y9vt4jIEDbEf1qwdUmZa7%3D&shhk=hZ4huI8qftfUBPpr39yqkhCAqQ%2ESb1TRoB6y1NslSBU%3D&form=VDSHOT&shth=OSH.e37Zi4sPEp3%252FEinDjvCKVQ

## 2020-07-10 NOTE — PROGRESS NOTES
"Subjective   Hayde Monique is a 49 year old female who presents to clinic today for the following health issues:    HPI   Right lat elbow p[ain for 5-6 months - does  work and that seems to make it worse. Has tried stretching   Ice, chiro, band. Not improving.  Elbow injection    Reviewed and updated as needed this visit by provider:  Tobacco  Allergies  Meds  Problems  Med Hx  Surg Hx  Fam Hx         Review of Systems   Constitutional, HEENT, cardiovascular, pulmonary, GI, , musculoskeletal, neuro, skin, endocrine and psych systems are negative, except as otherwise noted.          Objective   /84   Pulse 101   Temp 98.5  F (36.9  C) (Tympanic)   Ht 1.626 m (5' 4\")   Wt 98.4 kg (217 lb)   SpO2 99%   BMI 37.25 kg/m   Body mass index is 37.25 kg/m .  Physical Exam   GENERAL: healthy, alert, well nourished, well hydrated, no distress  NECK: no tenderness, no adenopathy, no asymmetry, no masses, no stiffness; thyroid- normal to palpation  MS: right elbow - lateral epicondyle tenderness and worse with forced supination and grasping otherwise extremities- no gross deformities noted, no edema  SKIN: no suspicious lesions, no rashes  NEURO: strength and tone- normal, sensory exam- grossly normal, mentation- intact, speech- normal, reflexes- symmetric          Assessment & Plan   Right lateral epicondylitis  Ongoing symptoms and will add steroid injection.  Exercises reviewed and video for home therapy given -   - DRAIN/INJECT MEDIUM JOINT/BURSA    After consent was obtained, using sterile technique the 5 lateral elbow was prepped and using a 1ml syringe with 25 gauge 0.5 inch needle - 0.75 ml of 1% plain Lidocaine and 0.25 ml of 40 mg/ml Kenalog was then injected and the needle withdrawn.  The procedure was well tolerated.  The patient is asked to continue to rest the treated area for 5-7 more days before resuming regular activities.  It may be more painful for the first 1-2 days.  Watch for " "fever, or increased swelling or persistent pain in elbow. Call or return to clinic prn if such symptoms occur or the pain fails to improve as anticipated.       Tobacco Cessation:   reports that she has been smoking cigarettes. She has been smoking about 0.00 packs per day for the past 1.00 year. She has never used smokeless tobacco.  Tobacco Cessation Action Plan: Self help information given to patient      BMI:   Estimated body mass index is 31.58 kg/m  as calculated from the following:    Height as of 10/15/19: 1.626 m (5' 4\").    Weight as of 11/4/19: 83.5 kg (184 lb).   Weight management plan: Discussed healthy diet and exercise guidelines        See Patient Instructions    Return in about 1 month (around 8/10/2020) for symptoms failing to improve or sooner if worsening.          Alfonzo Ny MD      [address] [address]  [email or web address]   Office: [phone]  Pager: [phone]         "

## 2020-07-11 ASSESSMENT — ANXIETY QUESTIONNAIRES: GAD7 TOTAL SCORE: 1

## 2020-07-22 ENCOUNTER — TRANSFERRED RECORDS (OUTPATIENT)
Dept: HEALTH INFORMATION MANAGEMENT | Facility: CLINIC | Age: 50
End: 2020-07-22

## 2020-07-22 DIAGNOSIS — R53.83 FATIGUE: ICD-10-CM

## 2020-07-22 DIAGNOSIS — G25.81 RESTLESS LEG: Primary | ICD-10-CM

## 2020-07-22 PROCEDURE — 82784 ASSAY IGA/IGD/IGG/IGM EACH: CPT | Mod: 59 | Performed by: PSYCHIATRY & NEUROLOGY

## 2020-07-22 PROCEDURE — 86334 IMMUNOFIX E-PHORESIS SERUM: CPT | Performed by: PSYCHIATRY & NEUROLOGY

## 2020-07-22 PROCEDURE — 82728 ASSAY OF FERRITIN: CPT | Performed by: PSYCHIATRY & NEUROLOGY

## 2020-07-22 PROCEDURE — 82784 ASSAY IGA/IGD/IGG/IGM EACH: CPT | Performed by: PSYCHIATRY & NEUROLOGY

## 2020-07-22 PROCEDURE — 36415 COLL VENOUS BLD VENIPUNCTURE: CPT | Performed by: PSYCHIATRY & NEUROLOGY

## 2020-07-23 LAB — FERRITIN SERPL-MCNC: 84 NG/ML (ref 8–252)

## 2020-07-24 LAB
IGA SERPL-MCNC: 61 MG/DL (ref 84–499)
IGG SERPL-MCNC: 538 MG/DL (ref 610–1616)
IGM SERPL-MCNC: 83 MG/DL (ref 35–242)
PROT PATTERN SERPL IFE-IMP: ABNORMAL

## 2020-10-13 DIAGNOSIS — F41.1 GENERALIZED ANXIETY DISORDER: ICD-10-CM

## 2020-10-13 DIAGNOSIS — F33.2 MAJOR DEPRESSIVE DISORDER, RECURRENT, SEVERE WITHOUT PSYCHOTIC FEATURES (H): ICD-10-CM

## 2020-10-13 NOTE — TELEPHONE ENCOUNTER
I last saw Hayde October 2019 for med check/mental health appointment.  She is now due her yearly appointment for recheck and refills.  It looks like she may be using a new clinic for her primary care.    If does a case, she should see her new PCP for her refills.  Otherwise if she still considers me her PCP, please have her schedule appointment with me for her mental health recheck ASAP.    Thank you.

## 2020-10-14 ENCOUNTER — VIRTUAL VISIT (OUTPATIENT)
Dept: URGENT CARE | Facility: CLINIC | Age: 50
End: 2020-10-14
Payer: COMMERCIAL

## 2020-10-14 DIAGNOSIS — J02.9 SORE THROAT: Primary | ICD-10-CM

## 2020-10-14 PROCEDURE — 99207 PR NO CHARGE LOS: CPT | Performed by: EMERGENCY MEDICINE

## 2020-10-15 ENCOUNTER — OFFICE VISIT (OUTPATIENT)
Dept: FAMILY MEDICINE | Facility: CLINIC | Age: 50
End: 2020-10-15
Payer: COMMERCIAL

## 2020-10-15 VITALS
SYSTOLIC BLOOD PRESSURE: 124 MMHG | RESPIRATION RATE: 14 BRPM | TEMPERATURE: 97.7 F | OXYGEN SATURATION: 98 % | HEART RATE: 72 BPM | DIASTOLIC BLOOD PRESSURE: 80 MMHG

## 2020-10-15 DIAGNOSIS — J02.9 SORE THROAT: Primary | ICD-10-CM

## 2020-10-15 LAB
DEPRECATED S PYO AG THROAT QL EIA: NEGATIVE
SPECIMEN SOURCE: NORMAL

## 2020-10-15 PROCEDURE — U0003 INFECTIOUS AGENT DETECTION BY NUCLEIC ACID (DNA OR RNA); SEVERE ACUTE RESPIRATORY SYNDROME CORONAVIRUS 2 (SARS-COV-2) (CORONAVIRUS DISEASE [COVID-19]), AMPLIFIED PROBE TECHNIQUE, MAKING USE OF HIGH THROUGHPUT TECHNOLOGIES AS DESCRIBED BY CMS-2020-01-R: HCPCS | Performed by: NURSE PRACTITIONER

## 2020-10-15 PROCEDURE — 99N1174 PR STATISTIC STREP A RAPID: Performed by: NURSE PRACTITIONER

## 2020-10-15 PROCEDURE — 87651 STREP A DNA AMP PROBE: CPT | Performed by: NURSE PRACTITIONER

## 2020-10-15 PROCEDURE — 99213 OFFICE O/P EST LOW 20 MIN: CPT | Performed by: NURSE PRACTITIONER

## 2020-10-15 NOTE — PROGRESS NOTES
HPI: Patient is a 50-year-old female who had the onset of sore throat this morning.  Her daughter who lives with her has had sore throat for 2 days.  No obvious strep exposure.  There is some question of cold exposure.  Patient has no cough, fever, shortness of breath, or any other cold symptoms      ROS: See HPI otherwise normal.    No Known Allergies   Current Outpatient Medications   Medication Sig Dispense Refill     busPIRone (BUSPAR) 5 MG tablet TAKE ONE TABLET BY MOUTH EVERY DAY 90 tablet 3     fluticasone (FLONASE) 50 MCG/ACT nasal spray Spray 2 sprays into both nostrils daily as needed for rhinitis or allergies       minocycline (MINOCIN/DYNACIN) 100 MG capsule TAKE ONE CAPSULE BY MOUTH TWICE A  capsule 1     multivitamin w/minerals (MULTI-VITAMIN) tablet Take 1 tablet by mouth daily       PARoxetine (PAXIL) 40 MG tablet TAKE ONE TABLET BY MOUTH EVERY DAY 90 tablet 3     VITAMIN D, CHOLECALCIFEROL, PO Take by mouth daily Patient does not know strength           PE: No acute distress and nondyspneic sounding on the phone.  No dysphonia.        TREATMENT: None.      ASSESSMENT: Sore throat in a 50-year-old female concern for possible strep throat versus viral such as COVID illness.  No concerning associated symptoms at this time.      DIAGNOSIS: Sore throat.      PLAN: Discussed optimal care is to have patient seen due to the possibility of sore throat being caused by strep and possibility of needing to be tested for COVID if strep negative.  She would like to contact her PCP tomorrow if she can be seen.  I asked that she be seen in urgent care as a backup if she is unable to see her primary care physician.    Time: 10 minutes

## 2020-10-16 LAB
SPECIMEN SOURCE: NORMAL
STREP GROUP A PCR: NOT DETECTED

## 2020-10-17 ENCOUNTER — MYC REFILL (OUTPATIENT)
Dept: FAMILY MEDICINE | Facility: CLINIC | Age: 50
End: 2020-10-17

## 2020-10-17 ENCOUNTER — MYC MEDICAL ADVICE (OUTPATIENT)
Dept: FAMILY MEDICINE | Facility: CLINIC | Age: 50
End: 2020-10-17

## 2020-10-17 DIAGNOSIS — F33.2 MAJOR DEPRESSIVE DISORDER, RECURRENT, SEVERE WITHOUT PSYCHOTIC FEATURES (H): ICD-10-CM

## 2020-10-17 DIAGNOSIS — L70.9 ACNE, UNSPECIFIED ACNE TYPE: ICD-10-CM

## 2020-10-17 DIAGNOSIS — F41.1 GENERALIZED ANXIETY DISORDER: ICD-10-CM

## 2020-10-17 LAB
SARS-COV-2 RNA SPEC QL NAA+PROBE: NOT DETECTED
SPECIMEN SOURCE: NORMAL

## 2020-10-17 NOTE — TELEPHONE ENCOUNTER
Tried to leave vm, but mailbox is full. Sent [facility] message verify primary care provider.    Tiffany MERAZ  [facility]

## 2020-10-19 RX ORDER — BUSPIRONE HYDROCHLORIDE 5 MG/1
TABLET ORAL
Qty: 90 TABLET | Refills: 1 | Status: SHIPPED | OUTPATIENT
Start: 2020-10-19 | End: 2021-05-07

## 2020-10-19 RX ORDER — MINOCYCLINE HYDROCHLORIDE 100 MG/1
CAPSULE ORAL
Qty: 180 CAPSULE | Refills: 1 | Status: SHIPPED | OUTPATIENT
Start: 2020-10-19 | End: 2021-07-12

## 2020-10-19 RX ORDER — PAROXETINE 40 MG/1
TABLET, FILM COATED ORAL
Qty: 90 TABLET | Refills: 1 | Status: SHIPPED | OUTPATIENT
Start: 2020-10-19 | End: 2021-05-07

## 2020-10-21 RX ORDER — BUSPIRONE HYDROCHLORIDE 5 MG/1
TABLET ORAL
Qty: 30 TABLET | Refills: 0 | OUTPATIENT
Start: 2020-10-21

## 2020-10-21 RX ORDER — PAROXETINE 40 MG/1
TABLET, FILM COATED ORAL
Qty: 30 TABLET | Refills: 0 | Status: SHIPPED | OUTPATIENT
Start: 2020-10-21 | End: 2020-12-01

## 2020-10-21 RX ORDER — BUSPIRONE HYDROCHLORIDE 5 MG/1
TABLET ORAL
Qty: 30 TABLET | Refills: 0 | Status: SHIPPED | OUTPATIENT
Start: 2020-10-21 | End: 2020-12-01

## 2020-10-21 RX ORDER — PAROXETINE 40 MG/1
TABLET, FILM COATED ORAL
Qty: 30 TABLET | Refills: 0 | OUTPATIENT
Start: 2020-10-21

## 2020-10-29 ENCOUNTER — MYC MEDICAL ADVICE (OUTPATIENT)
Dept: FAMILY MEDICINE | Facility: CLINIC | Age: 50
End: 2020-10-29

## 2020-10-29 NOTE — TELEPHONE ENCOUNTER
Please see [employer] message. Patient is due for a preventative visit now and mammo in November. However, per health maintenance, not due for Pap until 2022. Please assist with scheduling. Thank you.  GERA Meehan, RN  [facility]

## 2020-12-01 ENCOUNTER — OFFICE VISIT (OUTPATIENT)
Dept: FAMILY MEDICINE | Facility: CLINIC | Age: 50
End: 2020-12-01
Payer: COMMERCIAL

## 2020-12-01 VITALS
DIASTOLIC BLOOD PRESSURE: 68 MMHG | SYSTOLIC BLOOD PRESSURE: 110 MMHG | HEART RATE: 104 BPM | TEMPERATURE: 96.9 F | WEIGHT: 215.9 LBS | HEIGHT: 64 IN | BODY MASS INDEX: 36.86 KG/M2 | OXYGEN SATURATION: 98 %

## 2020-12-01 DIAGNOSIS — F41.1 GENERALIZED ANXIETY DISORDER: ICD-10-CM

## 2020-12-01 DIAGNOSIS — F33.2 MAJOR DEPRESSIVE DISORDER, RECURRENT, SEVERE WITHOUT PSYCHOTIC FEATURES (H): Primary | ICD-10-CM

## 2020-12-01 DIAGNOSIS — Z23 NEED FOR IMMUNIZATION AGAINST INFLUENZA: ICD-10-CM

## 2020-12-01 DIAGNOSIS — Z12.31 ENCOUNTER FOR SCREENING MAMMOGRAM FOR BREAST CANCER: ICD-10-CM

## 2020-12-01 PROCEDURE — 90686 IIV4 VACC NO PRSV 0.5 ML IM: CPT | Performed by: FAMILY MEDICINE

## 2020-12-01 PROCEDURE — 99213 OFFICE O/P EST LOW 20 MIN: CPT | Mod: 25 | Performed by: FAMILY MEDICINE

## 2020-12-01 PROCEDURE — 90471 IMMUNIZATION ADMIN: CPT | Performed by: FAMILY MEDICINE

## 2020-12-01 RX ORDER — GABAPENTIN 300 MG/1
CAPSULE ORAL
COMMUNITY
Start: 2020-07-22 | End: 2020-12-01

## 2020-12-01 ASSESSMENT — MIFFLIN-ST. JEOR: SCORE: 1589.32

## 2020-12-01 NOTE — PROGRESS NOTES
"Subjective     Hayde Monique is a 50 year old female who presents to clinic today for the following health issues:    HPI         Medication Followup of buspirone 5 mg Paroxetine 40 mg     Taking Medication as prescribed: yes    Side Effects:  None    Medication Helping Symptoms:  yes     PHQ 6/20/2018 10/15/2019 7/10/2020   PHQ-9 Total Score 5 1 1   Q9: Thoughts of better off dead/self-harm past 2 weeks Not at all Not at all Not at all   F/U: Thoughts of suicide or self-harm No - -   F/U: Safety concerns No - -     CARLOS-7 SCORE 6/20/2018 10/15/2019 7/10/2020   Total Score - - -   Total Score 8 11 1       Review of Systems   Constitutional, HEENT, cardiovascular, pulmonary, gi and gu systems are negative, except as otherwise noted.      Objective    /68   Pulse 104   Temp 96.9  F (36.1  C) (Oral)   Ht 1.626 m (5' 4\")   Wt 97.9 kg (215 lb 14.4 oz)   SpO2 98%   BMI 37.06 kg/m    Body mass index is 37.06 kg/m .  Physical Exam   GENERAL: healthy, alert and no distress  RESP: lungs clear to auscultation - no rales, rhonchi or wheezes  CV: regular rate and rhythm, normal S1 S2, no S3 or S4, no murmur, click or rub, no peripheral edema and peripheral pulses strong  PSYCH: mentation appears normal, affect normal/bright          Assessment & Plan     Major depressive disorder, recurrent, severe without psychotic features (H): medication working well. COntinue current plan    Generalized anxiety disorder            Return in about 6 months (around 6/1/2021) for follow up.    Dominik Arizmendi DO  [facility]    "

## 2020-12-06 ENCOUNTER — HEALTH MAINTENANCE LETTER (OUTPATIENT)
Age: 50
End: 2020-12-06

## 2020-12-29 ENCOUNTER — HOSPITAL ENCOUNTER (OUTPATIENT)
Dept: MAMMOGRAPHY | Facility: CLINIC | Age: 50
Discharge: HOME | End: 2020-12-29
Attending: FAMILY MEDICINE | Admitting: FAMILY MEDICINE
Payer: COMMERCIAL

## 2020-12-29 DIAGNOSIS — Z12.31 ENCOUNTER FOR SCREENING MAMMOGRAM FOR BREAST CANCER: ICD-10-CM

## 2020-12-29 PROCEDURE — 77063 BREAST TOMOSYNTHESIS BI: CPT

## 2021-05-07 ENCOUNTER — OFFICE VISIT (OUTPATIENT)
Dept: FAMILY MEDICINE | Facility: CLINIC | Age: 51
End: 2021-05-07
Payer: COMMERCIAL

## 2021-05-07 VITALS
WEIGHT: 216 LBS | TEMPERATURE: 97.7 F | SYSTOLIC BLOOD PRESSURE: 114 MMHG | OXYGEN SATURATION: 99 % | HEART RATE: 76 BPM | HEIGHT: 64 IN | BODY MASS INDEX: 36.88 KG/M2 | DIASTOLIC BLOOD PRESSURE: 72 MMHG | RESPIRATION RATE: 14 BRPM

## 2021-05-07 DIAGNOSIS — F33.2 MAJOR DEPRESSIVE DISORDER, RECURRENT, SEVERE WITHOUT PSYCHOTIC FEATURES (H): Primary | ICD-10-CM

## 2021-05-07 DIAGNOSIS — F41.1 GENERALIZED ANXIETY DISORDER: ICD-10-CM

## 2021-05-07 DIAGNOSIS — R10.13 DYSPEPSIA: ICD-10-CM

## 2021-05-07 PROCEDURE — 99214 OFFICE O/P EST MOD 30 MIN: CPT | Performed by: FAMILY MEDICINE

## 2021-05-07 RX ORDER — BUSPIRONE HYDROCHLORIDE 10 MG/1
10 TABLET ORAL DAILY
Qty: 90 TABLET | Refills: 1 | Status: SHIPPED | OUTPATIENT
Start: 2021-05-07 | End: 2021-10-07

## 2021-05-07 RX ORDER — PAROXETINE 40 MG/1
TABLET, FILM COATED ORAL
Qty: 90 TABLET | Refills: 1 | Status: SHIPPED | OUTPATIENT
Start: 2021-05-07 | End: 2021-10-07

## 2021-05-07 ASSESSMENT — PATIENT HEALTH QUESTIONNAIRE - PHQ9
5. POOR APPETITE OR OVEREATING: NOT AT ALL
SUM OF ALL RESPONSES TO PHQ QUESTIONS 1-9: 5

## 2021-05-07 ASSESSMENT — MIFFLIN-ST. JEOR: SCORE: 1589.77

## 2021-05-07 ASSESSMENT — ANXIETY QUESTIONNAIRES
2. NOT BEING ABLE TO STOP OR CONTROL WORRYING: SEVERAL DAYS
GAD7 TOTAL SCORE: 6
3. WORRYING TOO MUCH ABOUT DIFFERENT THINGS: MORE THAN HALF THE DAYS
6. BECOMING EASILY ANNOYED OR IRRITABLE: SEVERAL DAYS
1. FEELING NERVOUS, ANXIOUS, OR ON EDGE: NOT AT ALL
5. BEING SO RESTLESS THAT IT IS HARD TO SIT STILL: SEVERAL DAYS
7. FEELING AFRAID AS IF SOMETHING AWFUL MIGHT HAPPEN: SEVERAL DAYS
IF YOU CHECKED OFF ANY PROBLEMS ON THIS QUESTIONNAIRE, HOW DIFFICULT HAVE THESE PROBLEMS MADE IT FOR YOU TO DO YOUR WORK, TAKE CARE OF THINGS AT HOME, OR GET ALONG WITH OTHER PEOPLE: NOT DIFFICULT AT ALL

## 2021-05-07 ASSESSMENT — PAIN SCALES - GENERAL: PAINLEVEL: NO PAIN (0)

## 2021-05-07 NOTE — PROGRESS NOTES
"    Assessment & Plan     Major depressive disorder, recurrent, severe without psychotic features (H): will try increasing Buspar dose to 10 mg daily. If still no benefit, could increase to 10 mg twice daily.  - busPIRone (BUSPAR) 10 MG tablet; Take 1 tablet (10 mg) by mouth daily  - PARoxetine (PAXIL) 40 MG tablet; TAKE ONE TABLET BY MOUTH EVERY DAY    Generalized anxiety disorder  - busPIRone (BUSPAR) 10 MG tablet; Take 1 tablet (10 mg) by mouth daily  - PARoxetine (PAXIL) 40 MG tablet; TAKE ONE TABLET BY MOUTH EVERY DAY    Dyspepsia: discussed common triggers, keeping symptom diary. Will start omeprazole for the next 1-2 months and if needed, can change to H2 blocker in the future. Check H pylori.  - Helicobacter pylori Antigen Stool; Future  - omeprazole (PRILOSEC) 20 MG DR capsule; Take 1 capsule (20 mg) by mouth daily       BMI:   Estimated body mass index is 37.08 kg/m  as calculated from the following:    Height as of this encounter: 1.626 m (5' 4\").    Weight as of this encounter: 98 kg (216 lb).     Return in about 1 month (around 6/7/2021) for follow up dose adjustment.    Dominik Arizmendi DO  [facility]    Duyen Lock is a 50 year old who presents for the following health issues     HPI     Depression and Anxiety Follow-Up    How are you doing with your depression since your last visit? Worsened     How are you doing with your anxiety since your last visit?  Worsened     Have you had a significant life event? No, her son is very depressed which affects her mood as well.    Do you have any concerns with your use of alcohol or other drugs? No    Social History     Tobacco Use     Smoking status: Current Some Day Smoker     Packs/day: 0.00     Years: 1.00     Pack years: 0.00     Types: Cigarettes     Smokeless tobacco: Never Used   Substance Use Topics     Alcohol use: Not Currently     Alcohol/week: 0.0 standard drinks     Comment: three times per week about 2 glasses of " "wine     Drug use: No     PHQ 6/20/2018 10/15/2019 7/10/2020   PHQ-9 Total Score 5 1 1   Q9: Thoughts of better off dead/self-harm past 2 weeks Not at all Not at all Not at all   F/U: Thoughts of suicide or self-harm No - -   F/U: Safety concerns No - -     CARLOS-7 SCORE 6/20/2018 10/15/2019 7/10/2020   Total Score - - -   Total Score 8 11 1         Suicide Assessment Five-step Evaluation and Treatment (SAFE-T)      How many servings of fruits and vegetables do you eat daily?  0-1    On average, how many sweetened beverages do you drink each day (Examples: soda, juice, sweet tea, etc.  Do NOT count diet or artificially sweetened beverages)?   0    How many days per week do you exercise enough to make your heart beat faster? 3 or less    How many minutes a day do you exercise enough to make your heart beat faster? 30 - 60    How many days per week do you miss taking your medication? 0      Pt would also like add Pt notes sour stomach for the past 2 months has tried Pepto-Bismol, Rolaids, Tums with no relief. After eating, will feel bloated, full, 'icky' feeling. Sometimes feels nauseated and did throw up once. Sometimes will notice heartburn with this. Does notice some sour taste in the mouth as well. No blood in stool or dark black tarry stools. No constipation or diarrhea. No fevers or chills.       Review of Systems   Constitutional, HEENT, cardiovascular, pulmonary, gi and gu systems are negative, except as otherwise noted.      Objective    /72   Pulse 76   Temp 97.7  F (36.5  C) (Tympanic)   Resp 14   Ht 1.626 m (5' 4\")   Wt 98 kg (216 lb)   LMP 11/01/2020 (Approximate)   SpO2 99%   BMI 37.08 kg/m    Body mass index is 37.08 kg/m .  Physical Exam   GENERAL: healthy, alert and no distress  RESP: lungs clear to auscultation - no rales, rhonchi or wheezes  CV: regular rate and rhythm, normal S1 S2, no S3 or S4, no murmur, click or rub, no peripheral edema and peripheral pulses strong  ABDOMEN: soft, " nontender, no hepatosplenomegaly, no masses and bowel sounds normal  PSYCH: mentation appears normal, affect normal/bright

## 2021-05-08 ASSESSMENT — ANXIETY QUESTIONNAIRES: GAD7 TOTAL SCORE: 6

## 2021-05-10 DIAGNOSIS — R10.13 DYSPEPSIA: ICD-10-CM

## 2021-06-11 NOTE — ED NOTES
"Patient has had increased stress at home, son has some issues,  has been drinking more and patient feels isolated, not needed, feels alone. Patient has been trying to deal with increased stressors with therapy/son, family therapy but tonight felt overwhelmed with everything and presented to ER for help. Patient calm and cooperative in ER. Visibly tearful and upset. Patient knows that \"she needs to be here\".    " 4

## 2021-07-08 DIAGNOSIS — L70.9 ACNE, UNSPECIFIED ACNE TYPE: ICD-10-CM

## 2021-07-12 RX ORDER — MINOCYCLINE HYDROCHLORIDE 100 MG/1
CAPSULE ORAL
Qty: 180 CAPSULE | Refills: 1 | Status: SHIPPED | OUTPATIENT
Start: 2021-07-12 | End: 2022-01-21

## 2021-07-12 NOTE — TELEPHONE ENCOUNTER
Routing refill request to provider for review/approval because:  Per protocol:  If diagnosis IS acne or rosacea, OK to refill BASED ON PREVIOUS REFILL CLINICAL NOTE RECOMMENDATION.    This medication has not been addressed in recent visits  Carlos Dowling RN, BSN

## 2021-09-25 ENCOUNTER — HEALTH MAINTENANCE LETTER (OUTPATIENT)
Age: 51
End: 2021-09-25

## 2021-10-06 ASSESSMENT — ENCOUNTER SYMPTOMS
CONSTIPATION: 0
BREAST MASS: 0
HEMATOCHEZIA: 0
FREQUENCY: 1
CHILLS: 1
FEVER: 0
DIZZINESS: 1
NAUSEA: 1
HEMATURIA: 0
SORE THROAT: 1
HEARTBURN: 1
COUGH: 1
ARTHRALGIAS: 1
MYALGIAS: 1
DYSURIA: 0
SHORTNESS OF BREATH: 1
NERVOUS/ANXIOUS: 0
JOINT SWELLING: 1
DIARRHEA: 1
HEADACHES: 1
ABDOMINAL PAIN: 1
EYE PAIN: 0
WEAKNESS: 0
PARESTHESIAS: 1
PALPITATIONS: 1

## 2021-10-07 ENCOUNTER — OFFICE VISIT (OUTPATIENT)
Dept: FAMILY MEDICINE | Facility: CLINIC | Age: 51
End: 2021-10-07
Payer: COMMERCIAL

## 2021-10-07 VITALS
DIASTOLIC BLOOD PRESSURE: 72 MMHG | RESPIRATION RATE: 20 BRPM | BODY MASS INDEX: 37.15 KG/M2 | OXYGEN SATURATION: 98 % | SYSTOLIC BLOOD PRESSURE: 114 MMHG | TEMPERATURE: 97.8 F | HEIGHT: 64 IN | HEART RATE: 72 BPM | WEIGHT: 217.6 LBS

## 2021-10-07 DIAGNOSIS — K21.00 GASTROESOPHAGEAL REFLUX DISEASE WITH ESOPHAGITIS, UNSPECIFIED WHETHER HEMORRHAGE: ICD-10-CM

## 2021-10-07 DIAGNOSIS — F41.1 GENERALIZED ANXIETY DISORDER: ICD-10-CM

## 2021-10-07 DIAGNOSIS — F33.2 MAJOR DEPRESSIVE DISORDER, RECURRENT, SEVERE WITHOUT PSYCHOTIC FEATURES (H): ICD-10-CM

## 2021-10-07 DIAGNOSIS — Z12.11 SCREEN FOR COLON CANCER: ICD-10-CM

## 2021-10-07 DIAGNOSIS — Z13.220 SCREENING FOR LIPID DISORDERS: ICD-10-CM

## 2021-10-07 DIAGNOSIS — Z00.00 ROUTINE GENERAL MEDICAL EXAMINATION AT A HEALTH CARE FACILITY: Primary | ICD-10-CM

## 2021-10-07 DIAGNOSIS — Z12.31 ENCOUNTER FOR SCREENING MAMMOGRAM FOR BREAST CANCER: ICD-10-CM

## 2021-10-07 DIAGNOSIS — R10.13 EPIGASTRIC PAIN: ICD-10-CM

## 2021-10-07 PROCEDURE — 99396 PREV VISIT EST AGE 40-64: CPT | Mod: 25 | Performed by: NURSE PRACTITIONER

## 2021-10-07 PROCEDURE — 99214 OFFICE O/P EST MOD 30 MIN: CPT | Mod: 25 | Performed by: NURSE PRACTITIONER

## 2021-10-07 PROCEDURE — 90682 RIV4 VACC RECOMBINANT DNA IM: CPT | Performed by: NURSE PRACTITIONER

## 2021-10-07 PROCEDURE — 90471 IMMUNIZATION ADMIN: CPT | Performed by: NURSE PRACTITIONER

## 2021-10-07 RX ORDER — BUSPIRONE HYDROCHLORIDE 10 MG/1
10 TABLET ORAL DAILY
Qty: 90 TABLET | Refills: 1 | Status: SHIPPED | OUTPATIENT
Start: 2021-10-07 | End: 2022-04-01

## 2021-10-07 RX ORDER — PAROXETINE 40 MG/1
TABLET, FILM COATED ORAL
Qty: 90 TABLET | Refills: 1 | Status: SHIPPED | OUTPATIENT
Start: 2021-10-07 | End: 2022-01-05

## 2021-10-07 ASSESSMENT — ANXIETY QUESTIONNAIRES
GAD7 TOTAL SCORE: 3
2. NOT BEING ABLE TO STOP OR CONTROL WORRYING: NOT AT ALL
1. FEELING NERVOUS, ANXIOUS, OR ON EDGE: NOT AT ALL
1. FEELING NERVOUS, ANXIOUS, OR ON EDGE: NOT AT ALL
7. FEELING AFRAID AS IF SOMETHING AWFUL MIGHT HAPPEN: NOT AT ALL
6. BECOMING EASILY ANNOYED OR IRRITABLE: SEVERAL DAYS
2. NOT BEING ABLE TO STOP OR CONTROL WORRYING: NOT AT ALL
5. BEING SO RESTLESS THAT IT IS HARD TO SIT STILL: SEVERAL DAYS
GAD7 TOTAL SCORE: 3
6. BECOMING EASILY ANNOYED OR IRRITABLE: SEVERAL DAYS
IF YOU CHECKED OFF ANY PROBLEMS ON THIS QUESTIONNAIRE, HOW DIFFICULT HAVE THESE PROBLEMS MADE IT FOR YOU TO DO YOUR WORK, TAKE CARE OF THINGS AT HOME, OR GET ALONG WITH OTHER PEOPLE: SOMEWHAT DIFFICULT
7. FEELING AFRAID AS IF SOMETHING AWFUL MIGHT HAPPEN: NOT AT ALL
3. WORRYING TOO MUCH ABOUT DIFFERENT THINGS: NOT AT ALL
IF YOU CHECKED OFF ANY PROBLEMS ON THIS QUESTIONNAIRE, HOW DIFFICULT HAVE THESE PROBLEMS MADE IT FOR YOU TO DO YOUR WORK, TAKE CARE OF THINGS AT HOME, OR GET ALONG WITH OTHER PEOPLE: NOT DIFFICULT AT ALL
5. BEING SO RESTLESS THAT IT IS HARD TO SIT STILL: SEVERAL DAYS
3. WORRYING TOO MUCH ABOUT DIFFERENT THINGS: NOT AT ALL

## 2021-10-07 ASSESSMENT — PATIENT HEALTH QUESTIONNAIRE - PHQ9
5. POOR APPETITE OR OVEREATING: SEVERAL DAYS
5. POOR APPETITE OR OVEREATING: SEVERAL DAYS
SUM OF ALL RESPONSES TO PHQ QUESTIONS 1-9: 3

## 2021-10-07 ASSESSMENT — MIFFLIN-ST. JEOR: SCORE: 1592.03

## 2021-10-07 NOTE — PROGRESS NOTES
"   SUBJECTIVE:   CC: Hayde Monique is an 51 year old woman who presents for preventive health visit.   Patient has been advised of split billing requirements and indicates understanding: Yes     HPI  Epigastric Pain  Was seen 5/7/2021 for dyspepsia. Was prescribed omeprazole 20 mg, but did not fill prescription. Symptoms began 6 months ago and have been getting progressively worse. Is worried about an ulcer or tumor. Associated symptoms include bloating, epigastric abdominal pain, diaphoresis, dysphagia daily with every meal, feeling like food is getting stuck in esophagus, nausea/queasy (80% of the time), vomiting 4 times/month, acid reflux daily. Denies diarrhea, constipation, fever, or chills. Has noticed dark red blood in her stool one time in the past 6 months. Is due for colonoscopy and would like to schedule. Symptoms are worse after eating. Has tried raising HOB, taking smaller bites and chewing food more thoroughly with no relief. Had previously tried Tums, Mylanta, Maalox with no relief.    Menopausal, LMP: 2 years ago    Anxiety/ MDD  Is feeling good and tolerating the dose increase of buspirone. States she has no concerns and \"everything in my life is good right now.\"    Health maintenance  Would like to schedule mammogram and colonoscopy  Would like to receive Flu shot  Declines pneumococcal vaccine    Healthy Habits:     Getting at least 3 servings of Calcium per day:  NO    Bi-annual eye exam:  Yes    Dental care twice a year:  Yes    Sleep apnea or symptoms of sleep apnea:  None    Diet:  Regular (no restrictions)    Frequency of exercise:  2-3 days/week    Duration of exercise:  15-30 minutes    Taking medications regularly:  Yes    Medication side effects:  None    PHQ-2 Total Score: 0    Additional concerns today:  Yes    Exercise: uses Fit[employer], walks every day for 15-30 minutes  Work: works at home as a  and \"loves it\"    Depression and Anxiety Follow-Up    How are you doing with your " depression since your last visit? No change    How are you doing with your anxiety since your last visit?  No change    Are you having other symptoms that might be associated with depression or anxiety? No    Have you had a significant life event? No     Do you have any concerns with your use of alcohol or other drugs? No    Social History     Tobacco Use     Smoking status: Current Some Day Smoker     Packs/day: 0.00     Years: 1.00     Pack years: 0.00     Types: Cigarettes     Smokeless tobacco: Never Used   Substance Use Topics     Alcohol use: Not Currently     Alcohol/week: 0.0 standard drinks     Comment: three times per week about 2 glasses of wine     Drug use: No     Smokes: 5 cigarettes/ week on weekends  Alcohol: drinks 5 glasses wine/week average on weekends  Caffeine: 5 cups/day  Drugs: none    PHQ 5/7/2021 10/7/2021 10/7/2021   PHQ-9 Total Score 5 3 3   Q9: Thoughts of better off dead/self-harm past 2 weeks Not at all Not at all Not at all   F/U: Thoughts of suicide or self-harm - - -   F/U: Safety concerns - - -     CARLOS-7 SCORE 5/7/2021 10/7/2021 10/7/2021   Total Score - - -   Total Score 6 3 3         Today's PHQ-2 Score:   PHQ-2 ( 1999 Pfizer) 10/6/2021   Q1: Little interest or pleasure in doing things 0   Q2: Feeling down, depressed or hopeless 0   PHQ-2 Score 0   Q1: Little interest or pleasure in doing things Not at all   Q2: Feeling down, depressed or hopeless Not at all   PHQ-2 Score 0       Abuse: Current or Past (Physical, Sexual or Emotional) - No  Do you feel safe in your environment? Yes    Have you ever done Advance Care Planning? (For example, a Health Directive, POLST, or a discussion with a medical provider or your loved ones about your wishes): No, advance care planning information given to patient to review.  Patient declined advance care planning discussion at this time.    Social History     Tobacco Use     Smoking status: Current Some Day Smoker     Packs/day: 0.00     Years:  1.00     Pack years: 0.00     Types: Cigarettes     Smokeless tobacco: Never Used   Substance Use Topics     Alcohol use: Not Currently     Alcohol/week: 0.0 standard drinks     Comment: three times per week about 2 glasses of wine       Alcohol Use 10/6/2021   Prescreen: >3 drinks/day or >7 drinks/week? No   Prescreen: >3 drinks/day or >7 drinks/week? -       Reviewed orders with patient.  Reviewed health maintenance and updated orders accordingly - Yes  BP Readings from Last 3 Encounters:   10/07/21 114/72   21 114/72   20 110/68    Wt Readings from Last 3 Encounters:   10/07/21 98.7 kg (217 lb 9.6 oz)   21 98 kg (216 lb)   20 97.9 kg (215 lb 14.4 oz)                  Patient Active Problem List   Diagnosis     CARDIOVASCULAR SCREENING; LDL GOAL LESS THAN 160     OCD (obsessive compulsive disorder)     Anxiety attack     Major depression     Generalized anxiety disorder     Melasma     Family history of breast cancer     Class 1 obesity due to excess calories without serious comorbidity with body mass index (BMI) of 32.0 to 32.9 in adult     Right shoulder pain     Aftercare following surgery of the musculoskeletal system     Acne, unspecified acne type     Perimenopause     Right lateral epicondylitis     Past Surgical History:   Procedure Laterality Date     ABDOMEN SURGERY           ARTHROSCOPY SHOULDER Right 2017    Procedure:  Right shoulder arthroscopy and biceps tenolysis.  Arthroscopic subacromial decompresson (acromioplasty, bursectomy and coracoacromial ligament resection). Arthroscopic distal clavicle resection.  Surgeon:  Luca Rodríguez MD  Location[facility]     BREAST SURGERY      implants       SECTION       COSMETIC SURGERY      Implants     ENT SURGERY      Tubes     orif leg Right     right lower leg, rodding     ORTHOPEDIC SURGERY      Broken Leg - chin and screws     WRIST SURGERY Left     Left wrist, cyst  excision       Social History     Tobacco Use     Smoking status: Current Some Day Smoker     Packs/day: 0.00     Years: 1.00     Pack years: 0.00     Types: Cigarettes     Smokeless tobacco: Never Used   Substance Use Topics     Alcohol use: Not Currently     Alcohol/week: 0.0 standard drinks     Comment: three times per week about 2 glasses of wine     Family History   Problem Relation Age of Onset     Cancer Mother 55        Lung cancer     Other Cancer Mother         Lung     Depression Mother      Anxiety Disorder Mother      Rheumatoid Arthritis Sister      Breast Cancer Maternal Grandmother      Rheumatoid Arthritis Paternal Grandmother      Depression Sister      Anxiety Disorder Sister          Current Outpatient Medications   Medication Sig Dispense Refill     busPIRone (BUSPAR) 10 MG tablet Take 1 tablet (10 mg) by mouth daily 90 tablet 1     fluticasone (FLONASE) 50 MCG/ACT nasal spray Spray 2 sprays into both nostrils daily as needed for rhinitis or allergies       minocycline (MINOCIN) 100 MG capsule TAKE ONE CAPSULE BY MOUTH TWICE A  capsule 1     PARoxetine (PAXIL) 40 MG tablet TAKE ONE TABLET BY MOUTH EVERY DAY 90 tablet 1     omeprazole (PRILOSEC) 20 MG DR capsule Take 1 capsule (20 mg) by mouth daily (Patient not taking: Reported on 10/7/2021) 60 capsule 0       Breast Cancer Screening:    FHS-7:   Breast CA Risk Assessment (FHS-7) 10/6/2021   Did any of your first-degree relatives have breast or ovarian cancer? Yes   Did any of your relatives have bilateral breast cancer? No   Did any man in your family have breast cancer? No   Did any woman in your family have breast and ovarian cancer? Yes   Did any woman in your family have breast cancer before age 50 y? Yes   Do you have 2 or more relatives with breast and/or ovarian cancer? No   Do you have 2 or more relatives with breast and/or bowel cancer? No       Mammogram Screening: Recommended annual mammography  Pertinent mammograms are  reviewed under the imaging tab.    History of abnormal Pap smear: NO - age 30- 65 PAP every 3 years recommended  PAP / HPV Latest Ref Rng & Units 3/1/2019 10/8/2014 2013   PAP (Historical) - NIL NIL NIL   HPV16 NEG:Negative Negative - -   HPV18 NEG:Negative Negative - -   HRHPV NEG:Negative Negative - -     Reviewed and updated as needed this visit by clinical staff  Tobacco  Allergies  Meds  Problems  Med Hx  Surg Hx  Fam Hx          Reviewed and updated as needed this visit by Provider   Allergies  Meds  Problems            Past Medical History:   Diagnosis Date     Anxiety attack      Depressive disorder 2000     Dermatofibroma 2015     Family history of breast cancer      Family history of breast cancer      LSIL (low grade squamous intraepithelial lesion) on Pap smear 10/2010    2012 pap/hpv neg     OCD (obsessive compulsive disorder)      Right lateral epicondylitis 7/10/2020      Past Surgical History:   Procedure Laterality Date     ABDOMEN SURGERY           ARTHROSCOPY SHOULDER Right 2017    Procedure:  Right shoulder arthroscopy and biceps tenolysis.  Arthroscopic subacromial decompresson (acromioplasty, bursectomy and coracoacromial ligament resection). Arthroscopic distal clavicle resection.  Surgeon:  Luca Rodríguez MD  Location[facility]     BREAST SURGERY      implants       SECTION       COSMETIC SURGERY      Implants     ENT SURGERY      Tubes     orif leg Right     right lower leg, rodding     ORTHOPEDIC SURGERY      Broken Leg - chin and screws     WRIST SURGERY Left     Left wrist, cyst excision         CONSTITUTIONAL: POSITIVE for chills (recent cold symptoms, negative COVID test). Reports 17 lb weight gain during COVID pandemic. NEGATIVE for body aches.  HEENT: NEGATIVE for rhinorrhea, nasal congestion, loss of taste or smell.  RESP: POSITIVE for non-productive cough and SOB (recent cold symptoms, negative COVID  "test)  SKIN: POSITIVE for diaphoresis (attributes to menopause and/or recent URI)  BREAST: POSITIVE for slight bilateral tenderness occurring cyclically for 1-2 days  CV: POSITIVE for chest pain (cold & GERD symptoms), palpitations and peripheral edema (bilaterally, ankles feel \"tight,\" notices when wearing socks & imprint is left)  GI: POSITIVE for nausea, vomiting, abdominal pain and cramping, heartburn, bloating, dysphagia, \"softer stools,\" dark blood in stool x 1,  NEGATIVE for diarrhea, constipation, coughing or vomiting up blood.  : NEGATIVE for unusual urinary or vaginal symptoms. No vaginal bleeding. POSITIVE for \"age-related\" urinary urgency and frequency.  MUSCULOSKELETAL: POSITIVE for generalized \"aches and pains\" and arthritis in elbows, shoulders, hips, and knees (received cortisone shot in left elbow and lower back 6 months ago; takes ibuprofen 4 tabs q 2 weeks; swimming improves pain)  NEURO: NEGATIVE for weakness or dizziness. POSITIVE for paresthesias- has noticed hands go numb when walking and legs go numb at HS 2x/week (has seen neurology & was prescribed gabapentin but never filled rx; visits chiropractor with some relief). POSITIVE for headache.   PSYCHIATRIC: NEGATIVE for changes in mood or affect        OBJECTIVE:   /72   Pulse 72   Temp 97.8  F (36.6  C) (Tympanic)   Resp 20   Ht 1.626 m (5' 4\")   Wt 98.7 kg (217 lb 9.6 oz)   SpO2 98%   BMI 37.35 kg/m    Physical Exam  GENERAL APPEARANCE: healthy, alert and no distress  RESP: lungs clear to auscultation - no rales, rhonchi or wheezes  CV: regular rate and rhythm, normal S1 S2, no S3 or S4, no murmur, click or rub, no peripheral edema  ABDOMEN: soft, nontender, no hepatosplenomegaly, no masses and bowel sounds normal  MS: no musculoskeletal defects are noted and gait is age appropriate without ataxia  SKIN: no suspicious lesions or rashes on visible skin. No diaphoresis.   NEURO: mentation intact and speech normal  PSYCH: " "mentation appears normal and affect normal/bright      ASSESSMENT/PLAN:     Hayde was seen today for physical.    Diagnoses and all orders for this visit:    Routine general medical examination at a health care facility  -     INFLUENZA QUAD, RECOMBINANT, P-FREE (RIV4) (FLUBLOK)    Encounter for screening mammogram for breast cancer  -     *MA Screening Digital Bilateral; Future    Screening for lipid disorders  -     Lipid panel reflex to direct LDL Fasting; Future    Screen for colon cancer  -     Adult Gastro Ref - Procedure Only; Future    Gastroesophageal reflux disease with esophagitis, unspecified whether hemorrhage  Epigastric pain  Worsening GERD/epigastric pain, proceed with EGD and concurrent screening colonoscopy.    -     Adult Gastro Ref - Procedure Only; Future  -     Comprehensive metabolic panel (BMP + Alb, Alk Phos, ALT, AST, Total. Bili, TP); Future  -     CBC with platelets; Future    Major depressive disorder, recurrent, severe without psychotic features (H)  Generalized anxiety disorder  PHQ 5/7/2021 10/7/2021 10/7/2021   PHQ-9 Total Score 5 3 3   Q9: Thoughts of better off dead/self-harm past 2 weeks Not at all Not at all Not at all   F/U: Thoughts of suicide or self-harm - - -   F/U: Safety concerns - - -     CARLOS-7 SCORE 5/7/2021 10/7/2021 10/7/2021   Total Score - - -   Total Score 6 3 3     Currently well controlled.  Refills completed.    -     busPIRone (BUSPAR) 10 MG tablet; Take 1 tablet (10 mg) by mouth daily  -     PARoxetine (PAXIL) 40 MG tablet; TAKE ONE TABLET BY MOUTH EVERY DAY        COUNSELING:  Reviewed preventive health counseling, as reflected in patient instructions    Estimated body mass index is 37.35 kg/m  as calculated from the following:    Height as of this encounter: 1.626 m (5' 4\").    Weight as of this encounter: 98.7 kg (217 lb 9.6 oz).        She reports that she has been smoking cigarettes. She has been smoking about 0.00 packs per day for the past 1.00 year. " She has never used smokeless tobacco.      Counseling Resources:  ATP IV Guidelines  Pooled Cohorts Equation Calculator  Breast Cancer Risk Calculator  BRCA-Related Cancer Risk Assessment: FHS-7 Tool  FRAX Risk Assessment  ICSI Preventive Guidelines  Dietary Guidelines for Americans, 2010  USDA's MyPlate  ASA Prophylaxis  Lung CA Screening    Kamilla Taveras, MARY [facility]

## 2021-10-08 ASSESSMENT — ANXIETY QUESTIONNAIRES: GAD7 TOTAL SCORE: 3

## 2021-10-21 ENCOUNTER — LAB (OUTPATIENT)
Dept: LAB | Facility: CLINIC | Age: 51
End: 2021-10-21
Payer: COMMERCIAL

## 2021-10-21 DIAGNOSIS — Z13.220 SCREENING FOR LIPID DISORDERS: ICD-10-CM

## 2021-10-21 DIAGNOSIS — R10.13 EPIGASTRIC PAIN: ICD-10-CM

## 2021-10-21 DIAGNOSIS — K21.00 GASTROESOPHAGEAL REFLUX DISEASE WITH ESOPHAGITIS, UNSPECIFIED WHETHER HEMORRHAGE: ICD-10-CM

## 2021-10-21 LAB
ERYTHROCYTE [DISTWIDTH] IN BLOOD BY AUTOMATED COUNT: 12.7 % (ref 10–15)
HCT VFR BLD AUTO: 41.4 % (ref 35–47)
HGB BLD-MCNC: 13.6 G/DL (ref 11.7–15.7)
MCH RBC QN AUTO: 30.2 PG (ref 26.5–33)
MCHC RBC AUTO-ENTMCNC: 32.9 G/DL (ref 31.5–36.5)
MCV RBC AUTO: 92 FL (ref 78–100)
PLATELET # BLD AUTO: 249 10E3/UL (ref 150–450)
RBC # BLD AUTO: 4.5 10E6/UL (ref 3.8–5.2)
WBC # BLD AUTO: 6.5 10E3/UL (ref 4–11)

## 2021-10-21 PROCEDURE — 85027 COMPLETE CBC AUTOMATED: CPT

## 2021-10-21 PROCEDURE — 80061 LIPID PANEL: CPT

## 2021-10-21 PROCEDURE — 36415 COLL VENOUS BLD VENIPUNCTURE: CPT

## 2021-10-21 PROCEDURE — 80053 COMPREHEN METABOLIC PANEL: CPT

## 2021-10-22 LAB
ALBUMIN SERPL-MCNC: 3.8 G/DL (ref 3.4–5)
ALP SERPL-CCNC: 89 U/L (ref 40–150)
ALT SERPL W P-5'-P-CCNC: 76 U/L (ref 0–50)
ANION GAP SERPL CALCULATED.3IONS-SCNC: 8 MMOL/L (ref 3–14)
AST SERPL W P-5'-P-CCNC: 32 U/L (ref 0–45)
BILIRUB SERPL-MCNC: 0.5 MG/DL (ref 0.2–1.3)
BUN SERPL-MCNC: 13 MG/DL (ref 7–30)
CALCIUM SERPL-MCNC: 9.1 MG/DL (ref 8.5–10.1)
CHLORIDE BLD-SCNC: 110 MMOL/L (ref 94–109)
CHOLEST SERPL-MCNC: 309 MG/DL
CO2 SERPL-SCNC: 24 MMOL/L (ref 20–32)
CREAT SERPL-MCNC: 0.72 MG/DL (ref 0.52–1.04)
FASTING STATUS PATIENT QL REPORTED: YES
GFR SERPL CREATININE-BSD FRML MDRD: >90 ML/MIN/1.73M2
GLUCOSE BLD-MCNC: 93 MG/DL (ref 70–99)
HDLC SERPL-MCNC: 50 MG/DL
LDLC SERPL CALC-MCNC: 208 MG/DL
NONHDLC SERPL-MCNC: 259 MG/DL
POTASSIUM BLD-SCNC: 4.4 MMOL/L (ref 3.4–5.3)
PROT SERPL-MCNC: 7.2 G/DL (ref 6.8–8.8)
SODIUM SERPL-SCNC: 142 MMOL/L (ref 133–144)
TRIGL SERPL-MCNC: 254 MG/DL

## 2021-10-25 ENCOUNTER — MYC MEDICAL ADVICE (OUTPATIENT)
Dept: FAMILY MEDICINE | Facility: CLINIC | Age: 51
End: 2021-10-25

## 2021-10-25 DIAGNOSIS — E78.5 HYPERLIPIDEMIA WITH TARGET LDL LESS THAN 100: Primary | ICD-10-CM

## 2021-10-26 NOTE — TELEPHONE ENCOUNTER
Please see my chart message below     Please review and advise     Thank you     Sofia Morrison RN, BSN  [place] [facility]

## 2021-10-26 NOTE — TELEPHONE ENCOUNTER
My chart message sent     Sofia Morrison RN, BSN  [facility]

## 2021-10-27 RX ORDER — ATORVASTATIN CALCIUM 40 MG/1
40 TABLET, FILM COATED ORAL DAILY
Qty: 90 TABLET | Refills: 3 | Status: SHIPPED | OUTPATIENT
Start: 2021-10-27 | End: 2022-10-25

## 2021-10-28 NOTE — TELEPHONE ENCOUNTER
Rx sent to pharmacy. We should recheck lipids in 3 months. Future lab orders placed.    Dominik Arizmendi DO  10/27/2021 11:03 PM

## 2021-10-29 DIAGNOSIS — Z11.59 ENCOUNTER FOR SCREENING FOR OTHER VIRAL DISEASES: ICD-10-CM

## 2021-11-26 ENCOUNTER — LAB (OUTPATIENT)
Dept: LAB | Facility: CLINIC | Age: 51
End: 2021-11-26
Attending: INTERNAL MEDICINE
Payer: COMMERCIAL

## 2021-11-26 DIAGNOSIS — Z11.59 ENCOUNTER FOR SCREENING FOR OTHER VIRAL DISEASES: ICD-10-CM

## 2021-11-26 PROCEDURE — U0005 INFEC AGEN DETEC AMPLI PROBE: HCPCS

## 2021-11-27 LAB — SARS-COV-2 RNA RESP QL NAA+PROBE: NEGATIVE

## 2021-11-30 ENCOUNTER — HOSPITAL ENCOUNTER (OUTPATIENT)
Facility: CLINIC | Age: 51
Discharge: HOME | End: 2021-11-30
Attending: INTERNAL MEDICINE | Admitting: INTERNAL MEDICINE
Payer: COMMERCIAL

## 2021-11-30 VITALS
TEMPERATURE: 96.3 F | RESPIRATION RATE: 16 BRPM | OXYGEN SATURATION: 98 % | DIASTOLIC BLOOD PRESSURE: 78 MMHG | HEART RATE: 80 BPM | SYSTOLIC BLOOD PRESSURE: 118 MMHG

## 2021-11-30 LAB
COLONOSCOPY: NORMAL
UPPER GI ENDOSCOPY: NORMAL

## 2021-11-30 PROCEDURE — 45378 DIAGNOSTIC COLONOSCOPY: CPT | Performed by: INTERNAL MEDICINE

## 2021-11-30 PROCEDURE — 99153 MOD SED SAME PHYS/QHP EA: CPT | Performed by: INTERNAL MEDICINE

## 2021-11-30 PROCEDURE — G0121 COLON CA SCRN NOT HI RSK IND: HCPCS | Performed by: INTERNAL MEDICINE

## 2021-11-30 PROCEDURE — G0500 MOD SEDAT ENDO SERVICE >5YRS: HCPCS | Performed by: INTERNAL MEDICINE

## 2021-11-30 PROCEDURE — 250N000011 HC RX IP 250 OP 636: Performed by: INTERNAL MEDICINE

## 2021-11-30 PROCEDURE — 88305 TISSUE EXAM BY PATHOLOGIST: CPT | Mod: TC | Performed by: INTERNAL MEDICINE

## 2021-11-30 PROCEDURE — 250N000009 HC RX 250: Performed by: INTERNAL MEDICINE

## 2021-11-30 PROCEDURE — 43239 EGD BIOPSY SINGLE/MULTIPLE: CPT | Performed by: INTERNAL MEDICINE

## 2021-11-30 RX ORDER — ONDANSETRON 2 MG/ML
4 INJECTION INTRAMUSCULAR; INTRAVENOUS
Status: DISCONTINUED | OUTPATIENT
Start: 2021-11-30 | End: 2021-11-30 | Stop reason: HOSPADM

## 2021-11-30 RX ORDER — FLUMAZENIL 0.1 MG/ML
0.2 INJECTION, SOLUTION INTRAVENOUS
Status: DISCONTINUED | OUTPATIENT
Start: 2021-11-30 | End: 2021-11-30 | Stop reason: HOSPADM

## 2021-11-30 RX ORDER — NALOXONE HYDROCHLORIDE 0.4 MG/ML
0.2 INJECTION, SOLUTION INTRAMUSCULAR; INTRAVENOUS; SUBCUTANEOUS
Status: DISCONTINUED | OUTPATIENT
Start: 2021-11-30 | End: 2021-11-30 | Stop reason: HOSPADM

## 2021-11-30 RX ORDER — PROCHLORPERAZINE MALEATE 10 MG
10 TABLET ORAL EVERY 6 HOURS PRN
Status: DISCONTINUED | OUTPATIENT
Start: 2021-11-30 | End: 2021-11-30 | Stop reason: HOSPADM

## 2021-11-30 RX ORDER — SIMETHICONE 40MG/0.6ML
133 SUSPENSION, DROPS(FINAL DOSAGE FORM)(ML) ORAL
Status: DISCONTINUED | OUTPATIENT
Start: 2021-11-30 | End: 2021-11-30 | Stop reason: HOSPADM

## 2021-11-30 RX ORDER — EPINEPHRINE 1 MG/ML
0.1 INJECTION, SOLUTION INTRAMUSCULAR; SUBCUTANEOUS
Status: DISCONTINUED | OUTPATIENT
Start: 2021-11-30 | End: 2021-11-30 | Stop reason: HOSPADM

## 2021-11-30 RX ORDER — LIDOCAINE 40 MG/G
CREAM TOPICAL
Status: DISCONTINUED | OUTPATIENT
Start: 2021-11-30 | End: 2021-11-30 | Stop reason: HOSPADM

## 2021-11-30 RX ORDER — ATROPINE SULFATE 0.4 MG/ML
0.4 AMPUL (ML) INJECTION
Status: DISCONTINUED | OUTPATIENT
Start: 2021-11-30 | End: 2021-11-30 | Stop reason: HOSPADM

## 2021-11-30 RX ORDER — NALOXONE HYDROCHLORIDE 0.4 MG/ML
0.4 INJECTION, SOLUTION INTRAMUSCULAR; INTRAVENOUS; SUBCUTANEOUS
Status: DISCONTINUED | OUTPATIENT
Start: 2021-11-30 | End: 2021-11-30 | Stop reason: HOSPADM

## 2021-11-30 RX ORDER — ONDANSETRON 2 MG/ML
4 INJECTION INTRAMUSCULAR; INTRAVENOUS EVERY 6 HOURS PRN
Status: DISCONTINUED | OUTPATIENT
Start: 2021-11-30 | End: 2021-11-30 | Stop reason: HOSPADM

## 2021-11-30 RX ORDER — FENTANYL CITRATE 50 UG/ML
50-100 INJECTION, SOLUTION INTRAMUSCULAR; INTRAVENOUS
Status: COMPLETED | OUTPATIENT
Start: 2021-11-30 | End: 2021-11-30

## 2021-11-30 RX ORDER — ONDANSETRON 4 MG/1
4 TABLET, ORALLY DISINTEGRATING ORAL EVERY 6 HOURS PRN
Status: DISCONTINUED | OUTPATIENT
Start: 2021-11-30 | End: 2021-11-30 | Stop reason: HOSPADM

## 2021-11-30 RX ORDER — FENTANYL CITRATE 50 UG/ML
25-50 INJECTION, SOLUTION INTRAMUSCULAR; INTRAVENOUS
Status: DISCONTINUED | OUTPATIENT
Start: 2021-11-30 | End: 2021-11-30 | Stop reason: HOSPADM

## 2021-11-30 RX ADMIN — FENTANYL CITRATE 100 MCG: 50 INJECTION INTRAMUSCULAR; INTRAVENOUS at 10:23

## 2021-11-30 RX ADMIN — FENTANYL CITRATE 50 MCG: 50 INJECTION INTRAMUSCULAR; INTRAVENOUS at 10:41

## 2021-11-30 RX ADMIN — TOPICAL ANESTHETIC 0.5 ML: 200 SPRAY DENTAL; PERIODONTAL at 10:25

## 2021-11-30 RX ADMIN — MIDAZOLAM 2 MG: 1 INJECTION INTRAMUSCULAR; INTRAVENOUS at 10:24

## 2021-11-30 RX ADMIN — MIDAZOLAM 1 MG: 1 INJECTION INTRAMUSCULAR; INTRAVENOUS at 10:41

## 2021-11-30 NOTE — LETTER
November 3, 2021      Hayde Monique  [address] [place] [address]        Dear Hayde,     Please be aware that coverage of these services is subject to the terms and limitations of your health insurance plan.  Call member services at your health plan with any benefit or coverage questions.    Thank you for choosing [facility]. You are scheduled for the following service(s):    Date:  Tuesday November 30, 2021             Procedure:  COLONOSCOPY  Doctor:        Kirt Benitez MD   Arrival Time:  11:15am *Enter and check in at the Main Hospital Entrance*  Procedure Time:  12:00pm    Location:   [facility]        Endoscopy Department, First Floor         [address] [place], [place] [address] [phone] or [phone] ([facility]) to reschedule        MIRALAX -GATORADE  PREP  Colonoscopy is the most accurate test to detect colon polyps and colon cancer; and the only test where polyps can be removed. During this procedure, a doctor examines the lining of your large intestine and rectum through a flexible tube.   Transportation  You must arrange for a ride for the day of your procedure with a responsible adult. A taxi , Uber, etc, is not an option unless you are accompanied by a responsible adult. If you fail to arrange transportation with a responsible adult, your procedure will be cancelled and rescheduled.    Purchase the  following supplies at your local pharmacy:  - 2 (two) bisacodyl tablets: each tablet contains 5 mg.  (Dulcolax  laxative NOT Dulcolax  stool softener)   - 1 (one) 8.3 oz bottle of Polyethylene Glycol (PEG) 3350 Powder   (MiraLAX , Smooth LAX , ClearLAX  or equivalent)  - 64 oz Gatorade    Regular Gatorade, Gatorade G2 , Powerade , Powerade Zero  or Pedialyte  is acceptable. Red colored flavors are not allowed; all other colors (yellow, green, orange, purple and blue) are okay. It is also okay to buy two 2.12 oz packets of  powdered Gatorade that can be mixed with water to a total volume of 64 oz of liquid.  - 1 (one) 10 oz bottle of Magnesium Citrate (Red colored flavors are not allowed)  It is also okay for you to use a 0.5 oz package of powdered magnesium citrate (17 g) mixed with 10 oz of water.      PREPARATION FOR COLONOSCOPY    7 days before:    Discontinue fiber supplements and medications containing iron. This includes Metamucil  and Fibercon ; and multivitamins with iron.    3 days before:    Begin a low-fiber diet. A low-fiber diet helps making the cleanout more effective.     Examples of a low-fiber diet include (but are not limited to): white bread, white rice, pasta, crackers, fish, chicken, eggs, ground beef, creamy peanut butter, cooked/steamed/boiled vegetables, canned fruit, bananas, melons, milk, plain yogurt cheese, salad dressing and other condiments.     The following are not allowed on a low-fiber diet: seeds, nuts, popcorn, bran, whole wheat, corn, quinoa, raw fruits and vegetables, berries and dried fruit, beans and lentils.    For additional details on low-fiber diet, please refer to the table on the last page.    2 days before:    Continue the low-fiber diet.     Drink at least 8 glasses of water throughout the day.     Stop eating solid foods at 11:45 pm.    1 day before:    In the morning: begin a clear liquid diet (liquids you can see through).     Examples of a clear liquid diet include: water, clear broth or bouillon, Gatorade, Pedialyte or Powerade, carbonated and non-carbonated soft drinks (Sprite , 7-Up , ginger ale), strained fruit juices without pulp (apple, white grape, white cranberry), Jell-O  and popsicles.     The following are not allowed on a clear liquid diet: red liquids, alcoholic beverages, dairy products (milk, creamer, and yogurt), protein shakes, creamy broths, juice with pulp and chewing tobacco.    At noon: take 2 (two) bisacodyl tablets     At 4 (and no later than 6pm): start  drinking the Miralax-Gatorade preparation (8.3 oz of Miralax mixed with 64 oz of Gatorade in a large pitcher). Drink 1(one) 8 oz glass every 15 minutes thereafter, until the mixture is gone.    COLON CLEANSING TIPS: drink adequate amounts of fluids before and after your colon cleansing to prevent dehydration. Stay near a toilet because you will have diarrhea. Even if you are sitting on the toilet, continue to drink the cleansing solution every 15 minutes. If you feel nauseous or vomit, rinse your mouth with water, take a 15 to 30-minute-break and then continue drinking the solution. You will be uncomfortable until the stool has flushed from your colon (in about 2 to 4 hours). You may feel chilled.    Day of your procedure  You may take all of your morning medications including blood pressure medications, blood thinners (if you have not been instructed to stop these by our office), methadone, anti-seizure medications with sips of water 3 hours prior to your procedure or earlier. Do not take insulin or vitamins prior to your procedure. Continue the clear liquid diet.       4 hours prior: drink 10 oz of magnesium citrate. It may be easier to drink it with a straw.    STOP consuming all liquids after that.     Do not take anything by mouth during this time.     Allow extra time to travel to your procedure as you may need to stop and use a restroom along the way.    You are ready for the procedure, if you followed all instructions and your stool is no longer formed, but clear or yellow liquid. If you are unsure whether your colon is clean, please call our office at [phone] before you leave for your appointment.    Bring the following to your procedure:  - Insurance Card/Photo ID.   - List of current medications including over-the-counter medications and supplements.   - Your rescue inhaler if you currently use one to control asthma.    Canceling or rescheduling your appointment:   If you must cancel or reschedule  your appointment, please call [phone] as soon as possible.      COLONOSCOPY PRE-PROCEDURE CHECKLIST    If you have diabetes, ask your regular doctor for diet and medication restrictions.  If you take an anticoagulant or anti-platelet medication (such as Coumadin , Lovenox , Pradaxa , Xarelto , Eliquis , etc.), please call your primary doctor for advice on holding this medication.  If you take aspirin you may continue to do so.  If you are or may be pregnant, please discuss the risks and benefits of this procedure with your doctor.        What happens during a colonoscopy?    Plan to spend up to two hours, starting at registration time, at the endoscopy center the day of your procedure. The colonoscopy takes an average of 15 to 30 minutes. Recovery time is about 30 minutes.      Before the exam:    You will change into a gown.    Your medical history and medication list will be reviewed with you, unless that has been done over the phone prior to the procedure.     A nurse will insert an intravenous (IV) line into your hand or arm.    The doctor will meet with you and will give you a consent form to sign.  During the exam:     Medicine will be given through the IV line to help you relax.     Your heart rate and oxygen levels will be monitored. If your blood pressure is low, you may be given fluids through the IV line.     The doctor will insert a flexible hollow tube, called a colonoscope, into your rectum. The scope will be advanced slowly through the large intestine (colon).    You may have a feeling of fullness or pressure.     If an abnormal tissue or a polyp is found, the doctor may remove it through the endoscope for closer examination, or biopsy. Tissue removal is painless    After the exam:           Any tissue samples removed during the exam will be sent to a lab for evaluation. It may take 5-7 working days for you to be notified of the results.     A nurse will provide you with complete discharge  instructions before you leave the endoscopy center. Be sure to ask the nurse for specific instructions if you take blood thinners such as Aspirin, Coumadin or Plavix.     The doctor will prepare a full report for you and for the physician who referred you for the procedure.     Your doctor will talk with you about the initial results of your exam.      Medication given during the exam will prohibit you from driving for the rest of the day.     Following the exam, you may resume your normal diet. Your first meal should be light, no greasy foods. Avoid alcohol until the next day.     You may resume your regular activities the day after the procedure.         LOW-FIBER DIET    Foods RECOMMENDED Foods to AVOID   Breads, Cereal, Rice and Pasta:   White bread, rolls, biscuits, croissant and dominic toast.   Waffles, Spanish toast and pancakes.   White rice, noodles, pasta, macaroni and peeled cooked potatoes.   Plain crackers and saltines.   Cooked cereals: farina, cream of rice.   Cold cereals: Puffed Rice , Rice Krispies , Corn Flakes  and Special K    Breads, Cereal, Rice and Pasta:   Breads or rolls with nuts, seeds or fruit.   Whole wheat, pumpernickel, rye breads and cornbread.   Potatoes with skin, brown or wild rice, and kasha (buckwheat).     Vegetables:   Tender cooked and canned vegetables without seeds: carrots, asparagus tips, green or wax beans, pumpkin, spinach, lima beans. Vegetables:   Raw or steamed vegetables.   Vegetables with seeds.   Sauerkraut.   Winter squash, peas, broccoli, Brussel sprouts, cabbage, onions, cauliflower, baked beans, peas and corn.   Fruits:   Strained fruit juice.   Canned fruit, except pineapple.   Ripe bananas and melon. Fruits:   Prunes and prune juice.   Raw fruits.   Dried fruits: figs, dates and raisins.   Milk/Dairy:   Milk: plain or flavored.   Yogurt, custard and ice cream.   Cheese and cottage cheese Milk/Dairy:     Meat and other proteins:   ground, well-cooked tender  beef, lamb, ham, veal, pork, fish, poultry and organ meats.   Eggs.   Peanut butter without nuts. Meat and other proteins:   Tough, fibrous meats with gristle.   Dry beans, peas and lentils.   Peanut butter with nuts.   Tofu.   Fats, Snack, Sweets, Condiments and Beverages:   Margarine, butter, oils, mayonnaise, sour cream and salad dressing, plain gravy.   Sugar, hard candy, clear jelly, honey and syrup.   Spices, cooked herbs, bouillon, broth and soups made with allowed vegetable, ketchup and mustard.   Coffee, tea and carbonated drinks.   Plain cakes, cookies and pretzels.   Gelatin, plain puddings, custard, ice cream, sherbet and popsicles. Fats, Snack, Sweets, Condiments and Beverages:   Nuts, seeds and coconut.   Jam, marmalade and preserves.   Pickles, olives, relish and horseradish.   All desserts containing nuts, seeds, dried fruit and coconut; or made from whole grains or bran.   Candy made with nuts or seeds.   Popcorn.         DIRECTIONS TO THE ENDOSCOPY DEPARTMENT    From the north ([facility])  Take [address] South, exit on [address] [address]. Get into the left hand meera, turn left (east), go one-half mile to [address] and turn left. Go north to the second stoplight, take a right on [address] and follow it to the Main Hospital entrance.    From the south ([facility])  Take 35N to the [address]E split and exit on [address] [address]. On [address] [address], turn left (west) to [address]. Turn right (north) on [address]. Go north to the second stoplight, take a right on [address] and follow it to the Main Hospital entrance.    From the east via 35E ([facility])  Take [address] south to [address] [address] exit. Turn right on [address] [address]. Go west to [address]. Turn right (north) on [address]. Go to the second stoplight, take a right on [address] to the Main Hospital entrance.    From the east via [address] ([facility])  Take [facility] [address]  [address] to [address]. Turn left (south) on [address] to [address], turn left (east) on [address] and follow it to the Main Hospital entrance.    From the west via [address] ([place], [place])  Take [address] east to [address]. Turn right (south) on [address] to [address], turn left (east) on [address] and follow it to the Main Hospital entrance.

## 2021-11-30 NOTE — H&P
Pre-Endoscopy History and Physical     Hayde Monique MRN# [id number]   YOB: 1970 Age: 51 year old     Date of Procedure: 2021  Primary care provider: Dominik Arizmendi  Type of Endoscopy: Colonoscopy with possible biopsy, possible polypectomy and Gastroscopy with possible biopsy, possible dilation  Reason for Procedure: screen,reflux  Type of Anesthesia Anticipated: Conscious Sedation    HPI:    Hayde is a 51 year old female who will be undergoing the above procedure.      A history and physical has been performed. The patient's medications and allergies have been reviewed. The risks and benefits of the procedure and the sedation options and risks were discussed with the patient.  All questions were answered and informed consent was obtained.      She denies a personal or family history of anesthesia complications or bleeding disorders.     Patient Active Problem List   Diagnosis     CARDIOVASCULAR SCREENING; LDL GOAL LESS THAN 160     OCD (obsessive compulsive disorder)     Anxiety attack     Major depression     Generalized anxiety disorder     Melasma     Family history of breast cancer     Class 1 obesity due to excess calories without serious comorbidity with body mass index (BMI) of 32.0 to 32.9 in adult     Right shoulder pain     Aftercare following surgery of the musculoskeletal system     Acne, unspecified acne type     Perimenopause     Right lateral epicondylitis        Past Medical History:   Diagnosis Date     Anxiety attack      Depressive disorder 2000     Dermatofibroma 2015     Family history of breast cancer      Family history of breast cancer      LSIL (low grade squamous intraepithelial lesion) on Pap smear 10/2010    2012 pap/hpv neg     OCD (obsessive compulsive disorder)      Right lateral epicondylitis 7/10/2020        Past Surgical History:   Procedure Laterality Date     ABDOMEN SURGERY           ARTHROSCOPY SHOULDER Right 2017    Procedure:   Right shoulder arthroscopy and biceps tenolysis.  Arthroscopic subacromial decompresson (acromioplasty, bursectomy and coracoacromial ligament resection). Arthroscopic distal clavicle resection.  Surgeon:  Luca Rodríguez MD  Location: [facility]     BREAST SURGERY      implants       SECTION       COSMETIC SURGERY      Implants     ENT SURGERY      Tubes     orif leg Right     right lower leg, rodding     ORTHOPEDIC SURGERY Right     Broken Leg - chin and screws     WRIST SURGERY Left     Left wrist, cyst excision       Social History     Tobacco Use     Smoking status: Former Smoker     Packs/day: 0.00     Years: 1.00     Pack years: 0.00     Types: Cigarettes     Smokeless tobacco: Never Used     Tobacco comment: 2021   Substance Use Topics     Alcohol use: Not Currently     Alcohol/week: 0.0 standard drinks     Comment: three times per week about 2 glasses of wine       Family History   Problem Relation Age of Onset     Cancer Mother 55        Lung cancer     Other Cancer Mother         Lung     Depression Mother      Anxiety Disorder Mother      Rheumatoid Arthritis Sister      Breast Cancer Maternal Grandmother      Rheumatoid Arthritis Paternal Grandmother      Depression Sister      Anxiety Disorder Sister      Colon Cancer No family hx of        Prior to Admission medications    Medication Sig Start Date End Date Taking? Authorizing Provider   atorvastatin (LIPITOR) 40 MG tablet Take 1 tablet (40 mg) by mouth daily 10/27/21  Yes Dominik Arizmendi, DO   busPIRone (BUSPAR) 10 MG tablet Take 1 tablet (10 mg) by mouth daily 10/7/21  Yes Kamilla Taveras APRN CNP   fluticasone (FLONASE) 50 MCG/ACT nasal spray Spray 2 sprays into both nostrils daily as needed for rhinitis or allergies   Yes Unknown, Entered By History   minocycline (MINOCIN) 100 MG capsule TAKE ONE CAPSULE BY MOUTH TWICE A DAY 21  Yes Dominik Arizmendi, DO   omeprazole (PRILOSEC) 20 MG DR capsule  "Take 1 capsule (20 mg) by mouth daily 5/7/21  Yes Dominik Arizmendi, DO   PARoxetine (PAXIL) 40 MG tablet TAKE ONE TABLET BY MOUTH EVERY DAY 10/7/21  Yes Kamilla Taveras, APRN CNP       No Known Allergies     REVIEW OF SYSTEMS:   5 point ROS negative except as noted above in HPI, including Gen., Resp., CV, GI &  system review.    PHYSICAL EXAM:   There were no vitals taken for this visit. Estimated body mass index is 37.35 kg/m  as calculated from the following:    Height as of 10/7/21: 1.626 m (5' 4\").    Weight as of 10/7/21: 98.7 kg (217 lb 9.6 oz).   GENERAL APPEARANCE: alert, and oriented  MENTAL STATUS: alert  AIRWAY EXAM: Mallampatti Class I (visualization of the soft palate, fauces, uvula, anterior and posterior pillars)  RESP: lungs clear to auscultation - no rales, rhonchi or wheezes  CV: regular rates and rhythm  DIAGNOSTICS:    Not indicated    IMPRESSION   ASA Class 2 - Mild systemic disease    PLAN:   Plan for Colonoscopy with possible biopsy, possible polypectomy and Gastroscopy with possible biopsy, possible dilation. We discussed the risks, benefits and alternatives and the patient wished to proceed.    The above has been forwarded to the consulting provider.      Signed Electronically by: Kirt Benitez MD  November 30, 2021          "

## 2021-11-30 NOTE — DISCHARGE INSTRUCTIONS
Understanding H. pylori and Ulcers  Traditionally, ulcers, or sores in the lining of your digestive tract, were thought to be caused by too much spicy food, stress, or an anxious personality. We now know that most ulcers are probably due to infection with bacteria known as Helicobacter pylori (H. pylori).     H. pylori invade and disturb the lining of the digestive tract. Acid may weaken the area, causing an ulcer.      Common Ulcer Symptoms  Burning, cramping, or hunger-like pain in the stomach area, often one to three hours after a meal or in the middle of the night  Pain that gets better or worse with eating  Nausea or vomiting  Black, tarry, or bloody stools (which means the ulcer is bleeding)  Or you may have no symptoms.     An ulcer can form in two areas of the digestive tract; the stomach and the duodenum (where the stomach meets the small intestine).      Your Evaluation  An evaluation by your doctor can show if you have an ulcer and determine whether it was caused by H. pylori. Your doctor may ask you questions, examine you, and possibly do some tests. These may include:  A special X-ray called a barium upper gastrointestinal series, to help locate an ulcer. During the test, you drink a chalky liquid. This liquid helps the ulcer show up on the X-ray.  An endoscopic exam, done with a long tube passed through your mouth into your stomach, to give the doctor a closer look at your ulcer. You will be lightly sedated for this procedure. Your doctor can also take a tissue sample to test for H. pylori.  Blood, stool, and breath tests are also available to show whether you have H. pylori in your digestive tract.  Your Treatment  To kill H. pylori so your ulcer can heal, your doctor will probably prescribe antibiotics. Other ulcer medications that help reduce stomach acid may also be prescribed as well. Testing after treatment is recommended to be sure the H. pylori infection is gone. Usually, killing H. pylori  helps keep the ulcer from returning.    3276-3238 [facility], [address], [place] [address]. All rights reserved. This information is not intended as a substitute for professional medical care. Always follow your healthcare professional's instructions.    Understanding Diverticulosis and Diverticulitis     Pouches or diverticula usually occur in the lower part of the colon called the sigmoid.      Diverticulitis occurs when the pouches become inflamed.     The colon (large intestine) is the last part of the digestive tract. It absorbs water from stool and changes it from a liquid to a solid. In certain cases, small pouches called diverticula can form in the colon wall. This condition is called diverticulosis. The pouches can become infected. If this happens, it becomes a more serious problem called diverticulitis. These problems can be painful. But they can be managed.   Managing Your Condition  Diet changes or taking medications are often tried first. These may be enough to bring relief. If the case is bad, surgery may be done. You and your doctor can discuss the plan that is best for you.  If You Have Diverticulosis  Diet changes are often enough to control symptoms. The main changes are adding fiber (roughage) and drinking more water. Fiber absorbs water as it travels through your colon. This helps your stool stay soft and move smoothly. Water helps this process. If needed, you may be told to take over-the-counter stool softeners. To help relieve pain, antispasmodic medications may be prescribed.  If You Have Diverticulitis  Treatment depends on how bad your symptoms are.  For mild symptoms: You may be put on a liquid diet for a short time. You may also be prescribed antibiotics. If these two steps relieve your symptoms, you may then be prescribed a high-fiber diet. If you still have symptoms, your doctor will discuss further treatment options with you.  For severe symptoms: You may need to be  admitted to the hospital. There, you can be given IV antibiotics and fluids. Once symptoms are under control, the above treatments may be tried. If these don t control your condition, your doctor may discuss the option of having surgery with you.  [place] to Colon Health  Help keep your colon healthy with a diet that includes plenty of high-fiber fruits, vegetables, and whole grains. Drink plenty of liquids like water and juice. Your doctor may also recommend avoiding seeds and nuts.          8813-4440 Ramon \[facility] [place] [facility]\"", [address] [place] [address], [place] [address]. All rights reserved. This information is not intended as a substitute for professional medical care. Always follow your healthcare professional's instructions.    Eating a High-Fiber Diet  Fiber is what gives strength and structure to plants. Most grains, beans, vegetables, and fruits contain fiber. Foods rich in fiber are often low in calories and fat, and they fill you up more. They may also reduce your risks for certain health problems. To find out the amount of fiber in canned, packaged, or frozen foods, read the  Nutrition Facts  label. It tells you how much fiber is in a serving.      Types of Fiber and Their Benefits  There are two types of fiber: insoluble and soluble. They both aid digestion and help you maintain a healthy weight.  Insoluble fiber: This is found in whole grains, cereals, certain fruits and vegetables (such as apple skin, corn, and carrots). Insoluble fiber may prevent constipation and reduce the risk of certain types of cancer.   Soluble fiber: This type of fiber is in oats, beans, and certain fruits and vegetables (such as strawberries and peas). Soluble fiber can reduce cholesterol (which may help lower the risk of heart disease), and helps control blood sugar levels.  Look for High-Fiber Foods  Whole-grain breads and cereals: Try to eat 6-8 ounces a day. Include wheat and oat bran cereals, whole-wheat muffins or toast, and corn  tortillas in your meals.  Fruits: Try to eat 2 cups a day. Apples, oranges, strawberries, pears, and bananas are good sources. (Note: Fruit juice is low in fiber.)  Vegetables: Try to eat 3 cups a day. Add asparagus, carrots, broccoli, peas, and corn to your meals.  Legumes (beans): One cup of cooked lentils gives you over 15 grams of fiber. Try navy beans, lentils, and chickpeas.  Seeds:  A small handful of seeds gives you about 3 grams of fiber. Try sunflower seeds.    Keep Track of Your Fiber  A healthy diet includes 31 grams of fiber a day if you have a 2,000-calorie diet. Keep track of how much fiber you eat. Start by reading food labels. Then eat a variety of foods high in fiber. Ask your doctor about supplemental fiber products.            1363-1264 [place] [address]. All rights reserved. This information is not intended as a substitute for professional medical care. Always follow your healthcare professional's instructions.  The patient has received a copy of the Provation  report the doctor has written and discharge instructions have been discussed with the patient and responsible adult.  All questions were addressed and answered prior to patient discharge.

## 2021-11-30 NOTE — LETTER
November 3, 2021      Hayde Monique  [address] [place] [address]        Dear Hayde,     Thank you for choosing [facility]. You are scheduled for the following service(s).   Please be aware that coverage of these services is subject to the terms and limitations of your health insurance plan.  Call member services at your health plan with any benefit or coverage questions.    Date:  Tuesday November 30, 2021      Procedure: UPPER ENDOSCOPY & COLONOSCOPY  Doctor:  Kirt Benitez MD          Arrival Time:   11:15am  *check in at Main Door*  Procedure Time:   12:00pm  Location:   [facility]        Endoscopy Department, First Floor (Enter throug the Main Doors) *         [address] [place], [place] [address]      [phone] or [phone] () to reschedule       Upper Endoscopy or Esophagogastroduodenoscopy (EGD) is a test performed to evaluate symptoms of persistent abdominal pain, nausea, vomiting or difficulty swallowing. It may also be used to treat various conditions of the upper gastrointestinal (GI) tract, such as bleeding, narrowing or abnormal growths. During the procedure, a doctor examines the lining of your esophagus, stomach and the first part of your small intestine through a thin, flexible tube called an endoscope. If growths or other abnormalities are found during the procedure, the doctor may remove the abnormal tissue (biopsy) for further examination.     Colonoscopy is the most accurate test to detect colon polyps and colon cancer; and the only test where polyps can be removed. During this procedure, a doctor examines the lining of your large intestine and rectum through a flexible tube.     Transportation  You must arrange for a ride for the day of your procedure with a responsible adult. A taxi , Uber, etc, is not an option unless you are accompanied by a responsible adult. If you fail to arrange transportation with a  responsible adult, your procedure will be cancelled and rescheduled.    What happens during an upper endoscopy?  On the day of your procedure, plan to spend up to one and a half hours after your arrival at the endoscopy center. The exam itself takes about 5 to 10 minutes.  Before the exam:  - You will change into a gown.   - Your medical history and medication list will be reviewed with you, unless it has already been done over the phone.   - A nurse will insert an intravenous (IV) line into your hand or arm.  - The doctor will talk to you and give you a consent form to sign.    During the exam:  - Medicine will be given through the IV line to help you relax and feel comfortable.   - Your heart rate and oxygen levels will be monitored. If your blood pressure is low, you may be given fluids through the IV line.   - The doctor will insert a flexible, hollow tube, called an endoscope, into your mouth and will advance it slowly through the esophagus, stomach and duodenum (the first part of your small intestine).   - You may have a feeling of pressure or fullness.   - If you have difficulty swallowing, and the doctor finds a narrowing in your esophagus, it may be possible for the area to be expanded-dilated during the exam.   - If abnormal tissue is found, the doctor may remove it through the endoscope (biopsy it) for closer examination. The tissue removal is painless.    After the exam:  - Any tissue samples removed during the exam will be sent to a lab for evaluation. It may take 5 to 7 working days for you to be notified of the results  - The doctor will prepare a full report for the physician who referred you for the upper endoscopy.   - The doctor will talk with you about the initial results of your exam.   - You may feel bloated after the procedure. That is normal and should not last long.   - Your throat may feel sore for a short time.   - Following the exam, you may resume your normal diet. Avoid alcohol until  the next day.   - You may resume your regular activities the day after the procedure.   - Medication given during the exam will prohibit you from driving for the rest of the day.  - A nurse will provide you with complete discharge instructions before you leave the endoscopy center. Be sure to ask the nurse for specific instructions if you take blood thinners such as Aspirin , Coumadin , Lovenox , Plavix , etc.       PREPARATION FOR THE UPPER ENDOSCOPY  To ensure a successful exam, please follow all instructions carefully.      The night before your exam:    STOP eating solid foods at 11:45 pm.     Clear liquids are okay to drink (examples: Gatorade , apple juice, clear broth,coffee or tea without milk or cream, etc.).     DO NOT drink red liquids or alcoholic beverages.    The day of your exam:    STOP drinking clear liquids 4 hours before your exam.     You may take your usual medications with 4 oz. of water, but it needs to be at least 4 hours prior to your procedure.    When you leave for the procedure:    Bring a list of all of your current medications, including any allergy or over-the-counter medications, unless you have already reviewed that with an Endoscopy RN over the phone.     Bring a photo ID as well as up-to-date insurance information, such as your insurance card and any referral forms that might be required by your payer.     COLONOSCOPY:  MIRALAX -GATORADE  PREP  Purchase the  following supplies at your local pharmacy:  - 2 (two) bisacodyl tablets: each tablet contains 5 mg.  (Dulcolax  laxative NOT Dulcolax  stool softener)   - 1 (one) 8.3 oz bottle of Polyethylene Glycol (PEG) 3350 Powder   (MiraLAX , Smooth LAX , ClearLAX  or equivalent)  - 64 oz Gatorade    Regular Gatorade, Gatorade G2 , Powerade , Powerade Zero  or Pedialyte  is acceptable. Red colored flavors are not allowed; all other colors (yellow, green, orange, purple and blue) are okay. It is also okay to buy two 2.12 oz packets of  powdered Gatorade that can be mixed with water to a total volume of 64 oz of liquid.  - 1 (one) 10 oz bottle of Magnesium Citrate (Red colored flavors are not allowed)  It is also okay for you to use a 0.5 oz package of powdered magnesium citrate (17 g) mixed with 10 oz of water.      PREPARATION FOR COLONOSCOPY  7 days before:    Discontinue fiber supplements and medications containing iron. This includes Metamucil  and Fibercon ; and multivitamins with iron.    3 days before:    Begin a low-fiber diet. A low-fiber diet helps making the cleanout more effective.     Examples of a low-fiber diet include (but are not limited to): white bread, white rice, pasta, crackers, fish, chicken, eggs, ground beef, creamy peanut butter, cooked/steamed/boiled vegetables, canned fruit, bananas, melons, milk, plain yogurt cheese, salad dressing and other condiments.     The following are not allowed on a low-fiber diet: seeds, nuts, popcorn, bran, whole wheat, corn, quinoa, raw fruits and vegetables, berries and dried fruit, beans and lentils.    For additional details on low-fiber diet, please refer to the table on the last page.    2 days before:    Continue the low-fiber diet.     Drink at least 8 glasses of water throughout the day.     Stop eating solid foods at 11:45 pm.    1 day before:    In the morning: begin a clear liquid diet (liquids you can see through).     Examples of a clear liquid diet include: water, clear broth or bouillon, Gatorade, Pedialyte or Powerade, carbonated and non-carbonated soft drinks (Sprite , 7-Up , ginger ale), strained fruit juices without pulp (apple, white grape, white cranberry), Jell-O  and popsicles.     The following are not allowed on a clear liquid diet: red liquids, alcoholic beverages, dairy products (milk, creamer, and yogurt), protein shakes, creamy broths, juice with pulp and chewing tobacco.    At noon: take 2 (two) bisacodyl tablets     At 4 (and no later than 6pm): start drinking  the Miralax-Gatorade preparation (8.3 oz of Miralax mixed with 64 oz of Gatorade in a large pitcher). Drink 1(one) 8 oz glass every 15 minutes thereafter, until the mixture is gone.    COLON CLEANSING TIPS: drink adequate amounts of fluids before and after your colon cleansing to prevent dehydration. Stay near a toilet because you will have diarrhea. Even if you are sitting on the toilet, continue to drink the cleansing solution every 15 minutes. If you feel nauseous or vomit, rinse your mouth with water, take a 15 to 30-minute-break and then continue drinking the solution. You will be uncomfortable until the stool has flushed from your colon (in about 2 to 4 hours). You may feel chilled.    Day of your procedure  You may take all of your morning medications including blood pressure medications, blood thinners (if you have not been instructed to stop these by our office), methadone, anti-seizure medications with sips of water 3 hours prior to your procedure or earlier. Do not take insulin or vitamins prior to your procedure. Continue the clear liquid diet.   4 hours prior: drink 10 oz of magnesium citrate. It may be easier to drink it with a straw.    STOP consuming all liquids after that.     Do not take anything by mouth during this time.     Allow extra time to travel to your procedure as you may need to stop and use a restroom along the way.  You are ready for the procedure, if you followed all instructions and your stool is no longer formed, but clear or yellow liquid. If you are unsure whether your colon is clean, please call our office at [phone] before you leave for your appointment.  Bring the following to your procedure:  - Insurance Card/Photo ID.   - List of current medications including over-the-counter medications and supplements.   - Your rescue inhaler if you currently use one to control asthma.    Canceling or rescheduling your appointment:   If you must cancel or reschedule your appointment,  please call [phone] as soon as possible.    COLONOSCOPY PRE-PROCEDURE CHECKLIST  If you have diabetes, ask your regular doctor for diet and medication restrictions.  If you take an anticoagulant or anti-platelet medication (such as Coumadin , Lovenox , Pradaxa , Xarelto , Eliquis , etc.), please call your primary doctor for advice on holding this medication.  If you take aspirin you may continue to do so.  If you are or may be pregnant, please discuss the risks and benefits of this procedure with your doctor.    What happens during a colonoscopy?    Plan to spend up to two hours, starting at registration time, at the endoscopy center the day of your procedure. The colonoscopy takes an average of 15 to 30 minutes. Recovery time is about 30 minutes.      Before the exam:    You will change into a gown.    Your medical history and medication list will be reviewed with you, unless that has been done over the phone prior to the procedure.     A nurse will insert an intravenous (IV) line into your hand or arm.    The doctor will meet with you and will give you a consent form to sign.  During the exam:     Medicine will be given through the IV line to help you relax.     Your heart rate and oxygen levels will be monitored. If your blood pressure is low, you may be given fluids through the IV line.     The doctor will insert a flexible hollow tube, called a colonoscope, into your rectum. The scope will be advanced slowly through the large intestine (colon).    You may have a feeling of fullness or pressure.     If an abnormal tissue or a polyp is found, the doctor may remove it through the endoscope for closer examination, or biopsy. Tissue removal is painless    After the exam:           Any tissue samples removed during the exam will be sent to a lab for evaluation. It may take 5-7 working days for you to be notified of the results.     A nurse will provide you with complete discharge instructions before you leave  the endoscopy center. Be sure to ask the nurse for specific instructions if you take blood thinners such as Aspirin, Coumadin or Plavix.     The doctor will prepare a full report for you and for the physician who referred you for the procedure.     Your doctor will talk with you about the initial results of your exam.      Medication given during the exam will prohibit you from driving for the rest of the day.     Following the exam, you may resume your normal diet. Your first meal should be light, no greasy foods. Avoid alcohol until the next day.     You may resume your regular activities the day after the procedure.     LOW-FIBER DIET    Foods RECOMMENDED Foods to AVOID   Breads, Cereal, Rice and Pasta:   White bread, rolls, biscuits, croissant and dominic toast.   Waffles, [place] toast and pancakes.   White rice, noodles, pasta, macaroni and peeled cooked potatoes.   Plain crackers and saltines.   Cooked cereals: farina, cream of rice.   Cold cereals: Puffed Rice , Rice Krispies , Corn Flakes  and Special K    Breads, Cereal, Rice and Pasta:   Breads or rolls with nuts, seeds or fruit.   Whole wheat, pumpernickel, rye breads and cornbread.   Potatoes with skin, brown or wild rice, and kasha (buckwheat).     Vegetables:   Tender cooked and canned vegetables without seeds: carrots, asparagus tips, green or wax beans, pumpkin, spinach, lima beans. Vegetables:   Raw or steamed vegetables (w/ or without seeds)   Sauerkraut.   Winter squash, peas, broccoli, Brussel sprouts, cabbage, onions, cauliflower, baked beans, peas and corn.   Fruits:   Strained fruit juice.   Canned fruit, except pineapple.   Ripe bananas and melon. Fruits:   Prunes and prune juice.   Raw fruits.   Dried fruits: figs, dates and raisins.   Milk/Dairy:   Milk: plain or flavored; yogurt; ice cream.   Custard; cheese and cottage cheese Milk/Dairy:     Meat and other proteins:   Ground, well-cooked tender beef, lamb, ham, veal, pork, fish, poultry,  organ meats and eggs.   Peanut butter without nuts. Meat and other proteins:   Tough, fibrous meats with gristle.   Dry beans and peas; lentils and Tofu   Peanut butter with nuts.   Fats, Snack, Sweets, Condiments and Beverages:   Margarine, butter, oils, mayonnaise, sour cream and salad dressing, plain gravy.   Sugar, hard candy, clear jelly, honey and syrup.   Spices, cooked herbs, bouillon, broth and soups made with allowed vegetable, ketchup and mustard.   Coffee, tea and carbonated drinks.   Plain cakes, cookies and pretzels.   Gelatin, plain puddings, custard, ice cream, sherbet and popsicles. Fats, Snack, Sweets, Condiments and Beverages:   Nuts, seeds and coconut.   Jam, marmalade and preserves.   Pickles, olives, relish and horseradish.   All desserts containing nuts, seeds, dried fruit and coconut; or made from whole grains or bran.   Candy made with nuts or seeds.   Popcorn.         DIRECTIONS TO THE ENDOSCOPY DEPARTMENT    From the north ([facility])  Take [address] South, exit on [address] [address]. Get into the left hand meera, turn left (east), go one-half mile to [address] and turn left. Go north to the second stoplight, take a right on [address] and follow it to the Main Hospital entrance.    From the south ([facility])  Take 35N to the [address]E split and exit on [address] [address]. On [address] [address], turn left (west) to [address]. Turn right (north) on [address]. Go north to the second stoplight, take a right on [address] and follow it to the Main Hospital entrance.    From the east via 35E ([facility])  Take [address] south to [address] [address] exit. Turn right on [address] [address]. Go west to [address]. Turn right (north) on [address]. Go to the second stoplight, take a right on [address] to the Main Hospital entrance.    From the east via [address] ([facility])  Take [address] to [address]. Turn left (south) on  [address] to [address], turn left (east) on [address] and follow it to the Main Hospital entrance.    From the west via [address] ([place], [place])  Take [address] east to [address]. Turn right (south) on [address] to [address], turn left (east) on [address] and follow it to the Main Hospital entrance.

## 2021-12-01 LAB
PATH REPORT.COMMENTS IMP SPEC: NORMAL
PATH REPORT.FINAL DX SPEC: NORMAL
PATH REPORT.GROSS SPEC: NORMAL
PATH REPORT.MICROSCOPIC SPEC OTHER STN: NORMAL
PATH REPORT.RELEVANT HX SPEC: NORMAL
PHOTO IMAGE: NORMAL

## 2021-12-01 PROCEDURE — 88305 TISSUE EXAM BY PATHOLOGIST: CPT | Mod: 26

## 2021-12-03 DIAGNOSIS — R10.13 DYSPEPSIA: ICD-10-CM

## 2022-01-03 ENCOUNTER — MYC MEDICAL ADVICE (OUTPATIENT)
Dept: FAMILY MEDICINE | Facility: CLINIC | Age: 52
End: 2022-01-03
Payer: COMMERCIAL

## 2022-01-03 DIAGNOSIS — F33.2 MAJOR DEPRESSIVE DISORDER, RECURRENT, SEVERE WITHOUT PSYCHOTIC FEATURES (H): ICD-10-CM

## 2022-01-03 DIAGNOSIS — F41.1 GENERALIZED ANXIETY DISORDER: ICD-10-CM

## 2022-01-05 RX ORDER — PAROXETINE 40 MG/1
TABLET, FILM COATED ORAL
Qty: 90 TABLET | Refills: 1 | Status: SHIPPED | OUTPATIENT
Start: 2022-01-05 | End: 2022-11-08

## 2022-01-05 NOTE — TELEPHONE ENCOUNTER
Please see my chart message below     Please review and advise     Thank you     Sofia Morrison RN, BSN  [facility]

## 2022-01-13 ENCOUNTER — OFFICE VISIT (OUTPATIENT)
Dept: DERMATOLOGY | Facility: CLINIC | Age: 52
End: 2022-01-13
Payer: COMMERCIAL

## 2022-01-13 ENCOUNTER — ANCILLARY PROCEDURE (OUTPATIENT)
Dept: MAMMOGRAPHY | Facility: CLINIC | Age: 52
End: 2022-01-13
Attending: NURSE PRACTITIONER
Payer: COMMERCIAL

## 2022-01-13 VITALS — HEART RATE: 80 BPM | OXYGEN SATURATION: 97 % | DIASTOLIC BLOOD PRESSURE: 82 MMHG | SYSTOLIC BLOOD PRESSURE: 136 MMHG

## 2022-01-13 DIAGNOSIS — Z12.31 ENCOUNTER FOR SCREENING MAMMOGRAM FOR BREAST CANCER: ICD-10-CM

## 2022-01-13 DIAGNOSIS — Z12.31 VISIT FOR SCREENING MAMMOGRAM: ICD-10-CM

## 2022-01-13 DIAGNOSIS — L82.1 SEBORRHEIC KERATOSIS: Primary | ICD-10-CM

## 2022-01-13 DIAGNOSIS — L81.4 LENTIGO: ICD-10-CM

## 2022-01-13 DIAGNOSIS — L72.0 EPIDERMAL CYST: ICD-10-CM

## 2022-01-13 PROCEDURE — 77063 BREAST TOMOSYNTHESIS BI: CPT | Mod: TC | Performed by: RADIOLOGY

## 2022-01-13 PROCEDURE — 96999 UNLISTED SPEC DERM SVC/PX: CPT | Performed by: DERMATOLOGY

## 2022-01-13 PROCEDURE — 99203 OFFICE O/P NEW LOW 30 MIN: CPT | Performed by: DERMATOLOGY

## 2022-01-13 PROCEDURE — 77067 SCR MAMMO BI INCL CAD: CPT | Mod: TC | Performed by: RADIOLOGY

## 2022-01-13 NOTE — LETTER
2022         RE: Hayde Monique  [address] [place] [address]        Dear Colleague,    Thank you for referring your patient, Hayde Monique, to the [facility]. Please see a copy of my visit note below.    Hayde Monique is an extremely pleasant 51 year old year old female patient here today for spots on eyelids.   .   Patient states this has been present for a while.  Patient reports the following symptoms:  brown.  Patient reports the following previous treatments none.  These treatments did not work.  Patient reports the following modifying factors none.  Associated symptoms: none.  Patient has no other skin complaints today.  Remainder of the HPI, Meds, PMH, Allergies, FH, and SH was reviewed in chart.      Past Medical History:   Diagnosis Date     Anxiety attack      Depressive disorder 2000     Dermatofibroma 2015     Family history of breast cancer      Family history of breast cancer      LSIL (low grade squamous intraepithelial lesion) on Pap smear 10/2010    2012 pap/hpv neg     OCD (obsessive compulsive disorder)      Right lateral epicondylitis 7/10/2020       Past Surgical History:   Procedure Laterality Date     ABDOMEN SURGERY           ARTHROSCOPY SHOULDER Right 2017    Procedure:  Right shoulder arthroscopy and biceps tenolysis.  Arthroscopic subacromial decompresson (acromioplasty, bursectomy and coracoacromial ligament resection). Arthroscopic distal clavicle resection.  Surgeon:  Luca Rodríguez MD  Location: [facility]     BREAST SURGERY      implants       SECTION       COLONOSCOPY N/A 2021    Procedure: COLONOSCOPY;  Surgeon: Kirt Benitez MD;  Location:  GI     COSMETIC SURGERY      Implants     ENT SURGERY      Tubes     ESOPHAGOSCOPY, GASTROSCOPY, DUODENOSCOPY (EGD), COMBINED N/A 2021    Procedure: ESOPHAGOGASTRODUODENOSCOPY, WITH BIOPSies;  Surgeon: Kirt Benitez  MD;  Location: [facility]     orif leg Right 1995    right lower leg, rodding     ORTHOPEDIC SURGERY Right 1995    Broken Leg - chin and screws     WRIST SURGERY Left 1990    Left wrist, cyst excision        Family History   Problem Relation Age of Onset     Cancer Mother 55        Lung cancer     Other Cancer Mother         Lung     Depression Mother      Anxiety Disorder Mother      Rheumatoid Arthritis Sister      Breast Cancer Maternal Grandmother      Rheumatoid Arthritis Paternal Grandmother      Depression Sister      Anxiety Disorder Sister      Colon Cancer No family hx of        Social History     Socioeconomic History     Marital status:      Spouse name: Not on file     Number of children: Not on file     Years of education: Not on file     Highest education level: Not on file   Occupational History     Not on file   Tobacco Use     Smoking status: Former Smoker     Packs/day: 0.00     Years: 1.00     Pack years: 0.00     Types: Cigarettes     Smokeless tobacco: Never Used     Tobacco comment: sept 2021   Vaping Use     Vaping Use: Never used   Substance and Sexual Activity     Alcohol use: Not Currently     Alcohol/week: 0.0 standard drinks     Comment: three times per week about 2 glasses of wine     Drug use: No     Sexual activity: Yes     Partners: Male     Birth control/protection: Male Surgical, None   Other Topics Concern     Parent/sibling w/ CABG, MI or angioplasty before 65F 55M? No      Service No     Blood Transfusions No     Caffeine Concern Yes     Comment: 2 pops a day      Occupational Exposure Not Asked     Hobby Hazards Not Asked     Sleep Concern Not Asked     Stress Concern Not Asked     Weight Concern Not Asked     Special Diet Not Asked     Back Care Not Asked     Exercise Not Asked     Bike Helmet Not Asked     Seat Belt Not Asked     Self-Exams Yes   Social History Narrative     Not on file     Social Determinants of Health     Financial Resource Strain: Not on file    Food Insecurity: Not on file   Transportation Needs: Not on file   Physical Activity: Not on file   Stress: Not on file   Social Connections: Not on file   Intimate Partner Violence: Not on file   Housing Stability: Not on file       Outpatient Encounter Medications as of 1/13/2022   Medication Sig Dispense Refill     atorvastatin (LIPITOR) 40 MG tablet Take 1 tablet (40 mg) by mouth daily 90 tablet 3     busPIRone (BUSPAR) 10 MG tablet Take 1 tablet (10 mg) by mouth daily 90 tablet 1     fluticasone (FLONASE) 50 MCG/ACT nasal spray Spray 2 sprays into both nostrils daily as needed for rhinitis or allergies       minocycline (MINOCIN) 100 MG capsule TAKE ONE CAPSULE BY MOUTH TWICE A  capsule 1     omeprazole (PRILOSEC) 20 MG DR capsule TAKE ONE CAPSULE BY MOUTH EVERY DAY 60 capsule 3     PARoxetine (PAXIL) 40 MG tablet TAKE ONE TABLET BY MOUTH EVERY DAY 90 tablet 1     No facility-administered encounter medications on file as of 1/13/2022.             O:   NAD, WDWN, Alert & Oriented, Mood & Affect wnl, Vitals stable   Here today alone   /82   Pulse 80   SpO2 97%    General appearance normal   Vitals stable   Alert, oriented and in no acute distress     Tags on orbit  R lower lid cyst      Stuck on papules and brown macules on trunk and ext       Eyes: Conjunctivae/lids:Normal     ENT: Lips, buccal mucosa, tongue: normal    MSK:Normal    Cardiovascular: peripheral edema none    Pulm: Breathing Normal    Neuro/Psych: Orientation:Alert and Orientedx3 ; Mood/Affect:normal       A/P:  1. Seborrheic keratosis, lentigo,   2. Seborrheic keratosis on orbits  Cosmetic nature discussed with patient   She is fine with 150 dollar charge  L orbit x6, R orbit x1  TANGENTIAL EXCISION:  After consent, anesthesia with LEC and prep, tangential excision performed.  No complications and routine wound care.    3. Cyst  Schedule excision prn   It was a pleasure speaking to Hayde Monique today.  Previous clinic notes and  pertinent laboratory tests were reviewed prior to Hayde Monique's visit.  BENIGN LESIONS DISCUSSED WITH PATIENT:  I discussed the specifics of tumor, prognosis, and genetics of benign lesions.  I explained that treatment of these lesions would be purely cosmetic and not medically neccessary.  I discussed with patient different removal options including excision, cautery and /or laser.      Nature and genetics of benign skin lesions dicussed with patient.  Signs and Symptoms of skin cancer discussed with patient.  Patient encouraged to perform monthly skin exams.  UV precautions reviewed with patient.  Return to clinic next appt        Again, thank you for allowing me to participate in the care of your patient.        Sincerely,        Riley Murdock MD

## 2022-01-18 NOTE — RESULT ENCOUNTER NOTE
Dear Hayde,     -Mammogram was normal.  ADVISE: rechecking in 1 year.      Please send a Aiotra message or call [phone]  if you have any questions.      MARY Brian, CNP  [facility]    If you have further questions about the interpretation of your labs, [email or web address] is a good website to check out for further information.

## 2022-01-20 DIAGNOSIS — L70.9 ACNE, UNSPECIFIED ACNE TYPE: ICD-10-CM

## 2022-01-21 RX ORDER — MINOCYCLINE HYDROCHLORIDE 100 MG/1
CAPSULE ORAL
Qty: 180 CAPSULE | Refills: 1 | Status: SHIPPED | OUTPATIENT
Start: 2022-01-21 | End: 2022-07-29

## 2022-01-21 NOTE — TELEPHONE ENCOUNTER
Prescription approved per [facility] Refill Protocol.  Carlos VILLASENOR RN, BSN

## 2022-02-17 ENCOUNTER — OFFICE VISIT (OUTPATIENT)
Dept: DERMATOLOGY | Facility: CLINIC | Age: 52
End: 2022-02-17
Payer: COMMERCIAL

## 2022-02-17 ENCOUNTER — LAB (OUTPATIENT)
Dept: LAB | Facility: CLINIC | Age: 52
End: 2022-02-17
Payer: COMMERCIAL

## 2022-02-17 VITALS — HEART RATE: 97 BPM | OXYGEN SATURATION: 99 % | BODY MASS INDEX: 37.76 KG/M2 | WEIGHT: 220 LBS

## 2022-02-17 DIAGNOSIS — L72.0 MILIA: Primary | ICD-10-CM

## 2022-02-17 DIAGNOSIS — E78.5 HYPERLIPIDEMIA WITH TARGET LDL LESS THAN 100: ICD-10-CM

## 2022-02-17 LAB
ALT SERPL W P-5'-P-CCNC: 50 U/L (ref 0–50)
CHOLEST SERPL-MCNC: 226 MG/DL
FASTING STATUS PATIENT QL REPORTED: YES
HDLC SERPL-MCNC: 59 MG/DL
LDLC SERPL CALC-MCNC: 123 MG/DL
NONHDLC SERPL-MCNC: 167 MG/DL
TRIGL SERPL-MCNC: 220 MG/DL

## 2022-02-17 PROCEDURE — 99207 PR NO CHARGE LOS: CPT | Performed by: DERMATOLOGY

## 2022-02-17 PROCEDURE — 11310 SHAVE SKIN LESION 0.5 CM/<: CPT | Performed by: DERMATOLOGY

## 2022-02-17 PROCEDURE — 80061 LIPID PANEL: CPT

## 2022-02-17 PROCEDURE — 36415 COLL VENOUS BLD VENIPUNCTURE: CPT

## 2022-02-17 PROCEDURE — 84460 ALANINE AMINO (ALT) (SGPT): CPT

## 2022-02-17 NOTE — PROGRESS NOTES
Hayde Monique is an extremely pleasant 51 year old year old female patient here today for cys ton right lower lid.  Patient has no other skin complaints today.  Remainder of the HPI, Meds, PMH, Allergies, FH, and SH was reviewed in chart.      Past Medical History:   Diagnosis Date     Anxiety attack      Depressive disorder 2000     Dermatofibroma 2015     Family history of breast cancer      Family history of breast cancer      LSIL (low grade squamous intraepithelial lesion) on Pap smear 10/2010    2012 pap/hpv neg     OCD (obsessive compulsive disorder)      Right lateral epicondylitis 7/10/2020       Past Surgical History:   Procedure Laterality Date     ABDOMEN SURGERY           ARTHROSCOPY SHOULDER Right 2017    Procedure:  Right shoulder arthroscopy and biceps tenolysis.  Arthroscopic subacromial decompresson (acromioplasty, bursectomy and coracoacromial ligament resection). Arthroscopic distal clavicle resection.  Surgeon:  Luca Rodríguez MD  Location: [facility]     BREAST SURGERY      implants       SECTION       COLONOSCOPY N/A 2021    Procedure: COLONOSCOPY;  Surgeon: Kirt Benitez MD;  Location:  GI     COSMETIC SURGERY      Implants     ENT SURGERY      Tubes     ESOPHAGOSCOPY, GASTROSCOPY, DUODENOSCOPY (EGD), COMBINED N/A 2021    Procedure: ESOPHAGOGASTRODUODENOSCOPY, WITH BIOPSies;  Surgeon: Kirt Benitez MD;  Location:  GI     orif leg Right     right lower leg, rodding     ORTHOPEDIC SURGERY Right     Broken Leg - chin and screws     WRIST SURGERY Left     Left wrist, cyst excision        Family History   Problem Relation Age of Onset     Cancer Mother 55        Lung cancer     Other Cancer Mother         Lung     Depression Mother      Anxiety Disorder Mother      Rheumatoid Arthritis Sister      Breast Cancer Maternal Grandmother      Rheumatoid Arthritis Paternal Grandmother      Depression Sister       Anxiety Disorder Sister      Colon Cancer No family hx of        Social History     Socioeconomic History     Marital status:      Spouse name: Not on file     Number of children: Not on file     Years of education: Not on file     Highest education level: Not on file   Occupational History     Not on file   Tobacco Use     Smoking status: Former Smoker     Packs/day: 0.00     Years: 1.00     Pack years: 0.00     Types: Cigarettes     Smokeless tobacco: Never Used     Tobacco comment: sept 2021   Vaping Use     Vaping Use: Never used   Substance and Sexual Activity     Alcohol use: Not Currently     Alcohol/week: 0.0 standard drinks     Comment: three times per week about 2 glasses of wine     Drug use: No     Sexual activity: Yes     Partners: Male     Birth control/protection: Male Surgical, None   Other Topics Concern     Parent/sibling w/ CABG, MI or angioplasty before 65F 55M? No      Service No     Blood Transfusions No     Caffeine Concern Yes     Comment: 2 pops a day      Occupational Exposure Not Asked     Hobby Hazards Not Asked     Sleep Concern Not Asked     Stress Concern Not Asked     Weight Concern Not Asked     Special Diet Not Asked     Back Care Not Asked     Exercise Not Asked     Bike Helmet Not Asked     Seat Belt Not Asked     Self-Exams Yes   Social History Narrative     Not on file     Social Determinants of Health     Financial Resource Strain: Not on file   Food Insecurity: Not on file   Transportation Needs: Not on file   Physical Activity: Not on file   Stress: Not on file   Social Connections: Not on file   Intimate Partner Violence: Not on file   Housing Stability: Not on file       Outpatient Encounter Medications as of 2/17/2022   Medication Sig Dispense Refill     atorvastatin (LIPITOR) 40 MG tablet Take 1 tablet (40 mg) by mouth daily 90 tablet 3     busPIRone (BUSPAR) 10 MG tablet Take 1 tablet (10 mg) by mouth daily 90 tablet 1     fluticasone (FLONASE) 50 MCG/ACT  nasal spray Spray 2 sprays into both nostrils daily as needed for rhinitis or allergies       minocycline (MINOCIN) 100 MG capsule TAKE ONE CAPSULE BY MOUTH TWICE A  capsule 1     omeprazole (PRILOSEC) 20 MG DR capsule TAKE ONE CAPSULE BY MOUTH EVERY DAY 60 capsule 3     PARoxetine (PAXIL) 40 MG tablet TAKE ONE TABLET BY MOUTH EVERY DAY 90 tablet 1     No facility-administered encounter medications on file as of 2/17/2022.             O:   NAD, WDWN, Alert & Oriented, Mood & Affect wnl, Vitals stable   Here today alone   Pulse 97   Wt 99.8 kg (220 lb)   SpO2 99%   BMI 37.76 kg/m     General appearance normal   Vitals stable   Alert, oriented and in no acute distress     R lower eyelid 2mm white papule      Eyes: Conjunctivae/lids:Normal     ENT: Lips, buccal mucosa, tongue: normal    MSK:Normal    Cardiovascular: peripheral edema none    Pulm: Breathing Normal    Neuro/Psych: Orientation:Alert and Orientedx3 ; Mood/Affect:normal       A/P:  1. Cyst  Scar discussed with patient   TANGENTIAL EXCISION:  After consent, anesthesia with LEC and prep, tangential excision performed.  No complications and routine wound care.    It was a pleasure speaking to Hayde Monique today.  Nature and genetics of benign skin lesions dicussed with patient.

## 2022-02-17 NOTE — LETTER
2022         RE: Hayde Monique  [address] [place] [address]        Dear Colleague,    Thank you for referring your patient, Hayde Monique, to the [facility]. Please see a copy of my visit note below.    Hayde Monique is an extremely pleasant 51 year old year old female patient here today for cys ton right lower lid.  Patient has no other skin complaints today.  Remainder of the HPI, Meds, PMH, Allergies, FH, and SH was reviewed in chart.      Past Medical History:   Diagnosis Date     Anxiety attack      Depressive disorder 2000     Dermatofibroma 2015     Family history of breast cancer      Family history of breast cancer      LSIL (low grade squamous intraepithelial lesion) on Pap smear 10/2010    2012 pap/hpv neg     OCD (obsessive compulsive disorder)      Right lateral epicondylitis 7/10/2020       Past Surgical History:   Procedure Laterality Date     ABDOMEN SURGERY           ARTHROSCOPY SHOULDER Right 2017    Procedure:  Right shoulder arthroscopy and biceps tenolysis.  Arthroscopic subacromial decompresson (acromioplasty, bursectomy and coracoacromial ligament resection). Arthroscopic distal clavicle resection.  Surgeon:  Luca Rodríguez MD  Location: [facility]     BREAST SURGERY      implants       SECTION       COLONOSCOPY N/A 2021    Procedure: COLONOSCOPY;  Surgeon: Kirt Benitez MD;  Location:  GI     COSMETIC SURGERY      Implants     ENT SURGERY      Tubes     ESOPHAGOSCOPY, GASTROSCOPY, DUODENOSCOPY (EGD), COMBINED N/A 2021    Procedure: ESOPHAGOGASTRODUODENOSCOPY, WITH BIOPSies;  Surgeon: Kirt Benitez MD;  Location:  GI     orif leg Right     right lower leg, rodding     ORTHOPEDIC SURGERY Right     Broken Leg - chin and screws     WRIST SURGERY Left     Left wrist, cyst excision        Family History   Problem Relation Age of Onset     Cancer Mother 55         Lung cancer     Other Cancer Mother         Lung     Depression Mother      Anxiety Disorder Mother      Rheumatoid Arthritis Sister      Breast Cancer Maternal Grandmother      Rheumatoid Arthritis Paternal Grandmother      Depression Sister      Anxiety Disorder Sister      Colon Cancer No family hx of        Social History     Socioeconomic History     Marital status:      Spouse name: Not on file     Number of children: Not on file     Years of education: Not on file     Highest education level: Not on file   Occupational History     Not on file   Tobacco Use     Smoking status: Former Smoker     Packs/day: 0.00     Years: 1.00     Pack years: 0.00     Types: Cigarettes     Smokeless tobacco: Never Used     Tobacco comment: sept 2021   Vaping Use     Vaping Use: Never used   Substance and Sexual Activity     Alcohol use: Not Currently     Alcohol/week: 0.0 standard drinks     Comment: three times per week about 2 glasses of wine     Drug use: No     Sexual activity: Yes     Partners: Male     Birth control/protection: Male Surgical, None   Other Topics Concern     Parent/sibling w/ CABG, MI or angioplasty before 65F 55M? No      Service No     Blood Transfusions No     Caffeine Concern Yes     Comment: 2 pops a day      Occupational Exposure Not Asked     Hobby Hazards Not Asked     Sleep Concern Not Asked     Stress Concern Not Asked     Weight Concern Not Asked     Special Diet Not Asked     Back Care Not Asked     Exercise Not Asked     Bike Helmet Not Asked     Seat Belt Not Asked     Self-Exams Yes   Social History Narrative     Not on file     Social Determinants of Health     Financial Resource Strain: Not on file   Food Insecurity: Not on file   Transportation Needs: Not on file   Physical Activity: Not on file   Stress: Not on file   Social Connections: Not on file   Intimate Partner Violence: Not on file   Housing Stability: Not on file       Outpatient Encounter Medications as of  2/17/2022   Medication Sig Dispense Refill     atorvastatin (LIPITOR) 40 MG tablet Take 1 tablet (40 mg) by mouth daily 90 tablet 3     busPIRone (BUSPAR) 10 MG tablet Take 1 tablet (10 mg) by mouth daily 90 tablet 1     fluticasone (FLONASE) 50 MCG/ACT nasal spray Spray 2 sprays into both nostrils daily as needed for rhinitis or allergies       minocycline (MINOCIN) 100 MG capsule TAKE ONE CAPSULE BY MOUTH TWICE A  capsule 1     omeprazole (PRILOSEC) 20 MG DR capsule TAKE ONE CAPSULE BY MOUTH EVERY DAY 60 capsule 3     PARoxetine (PAXIL) 40 MG tablet TAKE ONE TABLET BY MOUTH EVERY DAY 90 tablet 1     No facility-administered encounter medications on file as of 2/17/2022.             O:   NAD, WDWN, Alert & Oriented, Mood & Affect wnl, Vitals stable   Here today alone   Pulse 97   Wt 99.8 kg (220 lb)   SpO2 99%   BMI 37.76 kg/m     General appearance normal   Vitals stable   Alert, oriented and in no acute distress     R lower eyelid 2mm white papule      Eyes: Conjunctivae/lids:Normal     ENT: Lips, buccal mucosa, tongue: normal    MSK:Normal    Cardiovascular: peripheral edema none    Pulm: Breathing Normal    Neuro/Psych: Orientation:Alert and Orientedx3 ; Mood/Affect:normal       A/P:  1. Cyst  Scar discussed with patient   TANGENTIAL EXCISION:  After consent, anesthesia with LEC and prep, tangential excision performed.  No complications and routine wound care.    It was a pleasure speaking to Hayde Monique today.  Nature and genetics of benign skin lesions dicussed with patient.        Again, thank you for allowing me to participate in the care of your patient.        Sincerely,        Riley Murdock MD

## 2022-02-17 NOTE — NURSING NOTE
"Initial Pulse 97   Wt 99.8 kg (220 lb)   SpO2 99%   BMI 37.76 kg/m   Estimated body mass index is 37.76 kg/m  as calculated from the following:    Height as of 10/7/21: 1.626 m (5' 4\").    Weight as of this encounter: 99.8 kg (220 lb).     BUTCH Umana-ELIEN-N  [facility]  [phone]  "

## 2022-02-17 NOTE — PATIENT INSTRUCTIONS
Wound Care Instructions     FOR SUPERFICIAL WOUNDS     [facility] [phone]                   AFTER 24 HOURS YOU SHOULD REMOVE THE BANDAGE AND BEGIN DAILY DRESSING CHANGES AS FOLLOWS:     1) Remove Dressing.     2) Clean and dry the area with tap water using a Q-tip or sterile gauze pad.     3) Apply Vaseline, Aquaphor, Polysporin ointment or Bacitracin ointment over entire wound.  Do NOT use Neosporin ointment.     4) Cover the wound with a band-aid, or a sterile non-stick gauze pad and micropore paper tape      REPEAT THESE INSTRUCTIONS AT LEAST ONCE A DAY UNTIL THE WOUND HAS COMPLETELY HEALED.    It is an old wives tale that a wound heals better when it is exposed to air and allowed to dry out. The wound will heal faster with a better cosmetic result if it is kept moist with ointment and covered with a bandage.    **Do not let the wound dry out.**      Supplies Needed:      *Cotton tipped applicators (Q-tips)    *Polysporin Ointment or Bacitracin Ointment (NOT NEOSPORIN)    *Band-aids or non-stick gauze pads and micropore paper tape.      PATIENT INFORMATION:    During the healing process you will notice a number of changes. All wounds develop a small halo of redness surrounding the wound.  This means healing is occurring. Severe itching with extensive redness usually indicates sensitivity to the ointment or bandage tape used to dress the wound.  You should call our office if this develops.      Swelling  and/or discoloration around your surgical site is common, particularly when performed around the eye.    All wounds normally drain.  The larger the wound the more drainage there will be.  After 7-10 days, you will notice the wound beginning to shrink and new skin will begin to grow.  The wound is healed when you can see skin has formed over the entire area.  A healed wound has a healthy, shiny look to the surface and is red to dark pink in color to normalize.  Wounds may take  approximately 4-6 weeks to heal.  Larger wounds may take 6-8 weeks.  After the wound is healed you may discontinue dressing changes.    You may experience a sensation of tightness as your wound heals. This is normal and will gradually subside.    Your healed wound may be sensitive to temperature changes. This sensitivity improves with time, but if you re having a lot of discomfort, try to avoid temperature extremes.    Patients frequently experience itching after their wound appears to have healed because of the continue healing under the skin.  Plain Vaseline will help relieve the itching.        POSSIBLE COMPLICATIONS    BLEEDIN. Leave the bandage in place.  2. Use tightly rolled up gauze or a cloth to apply direct pressure over the bandage for 30  minutes.  3. Reapply pressure for an additional 30 minutes if necessary  4. Use additional gauze and tape to maintain pressure once the bleeding has stopped.

## 2022-05-06 DIAGNOSIS — F41.1 GENERALIZED ANXIETY DISORDER: ICD-10-CM

## 2022-05-06 DIAGNOSIS — F33.2 MAJOR DEPRESSIVE DISORDER, RECURRENT, SEVERE WITHOUT PSYCHOTIC FEATURES (H): ICD-10-CM

## 2022-05-10 RX ORDER — BUSPIRONE HYDROCHLORIDE 10 MG/1
TABLET ORAL
Qty: 30 TABLET | Refills: 0 | Status: SHIPPED | OUTPATIENT
Start: 2022-05-10 | End: 2022-06-03

## 2022-05-30 DIAGNOSIS — F41.1 GENERALIZED ANXIETY DISORDER: ICD-10-CM

## 2022-05-30 DIAGNOSIS — F33.2 MAJOR DEPRESSIVE DISORDER, RECURRENT, SEVERE WITHOUT PSYCHOTIC FEATURES (H): ICD-10-CM

## 2022-06-02 ENCOUNTER — OFFICE VISIT (OUTPATIENT)
Dept: URGENT CARE | Facility: URGENT CARE | Age: 52
End: 2022-06-02
Payer: COMMERCIAL

## 2022-06-02 ENCOUNTER — ANCILLARY PROCEDURE (OUTPATIENT)
Dept: GENERAL RADIOLOGY | Facility: CLINIC | Age: 52
End: 2022-06-02
Attending: PHYSICIAN ASSISTANT
Payer: COMMERCIAL

## 2022-06-02 VITALS
WEIGHT: 220 LBS | OXYGEN SATURATION: 99 % | DIASTOLIC BLOOD PRESSURE: 84 MMHG | SYSTOLIC BLOOD PRESSURE: 132 MMHG | BODY MASS INDEX: 37.76 KG/M2 | RESPIRATION RATE: 18 BRPM | HEART RATE: 77 BPM

## 2022-06-02 DIAGNOSIS — S99.922A INJURY OF TOE ON LEFT FOOT, INITIAL ENCOUNTER: Primary | ICD-10-CM

## 2022-06-02 DIAGNOSIS — S99.922A INJURY OF TOE ON LEFT FOOT, INITIAL ENCOUNTER: ICD-10-CM

## 2022-06-02 PROCEDURE — 99213 OFFICE O/P EST LOW 20 MIN: CPT | Performed by: PHYSICIAN ASSISTANT

## 2022-06-02 PROCEDURE — 73660 X-RAY EXAM OF TOE(S): CPT | Mod: TC | Performed by: RADIOLOGY

## 2022-06-02 RX ORDER — IBUPROFEN 600 MG/1
600 TABLET, FILM COATED ORAL EVERY 6 HOURS PRN
Qty: 30 TABLET | Refills: 0 | Status: SHIPPED | OUTPATIENT
Start: 2022-06-02 | End: 2022-11-08

## 2022-06-02 NOTE — TELEPHONE ENCOUNTER
Routing refill request to provider for review/approval because:  Buspar does not pass  the [facility] refill protocol due to appointment      appointment versus approval?     Last office visit for depression 5/7/2176-[id number]  Physical was 10/7/21-Meixl   PHQ 10/7/2021 10/7/2021 5/25/2022   PHQ-9 Total Score 3 3 0   Q9: Thoughts of better off dead/self-harm past 2 weeks Not at all Not at all Not at all   F/U: Thoughts of suicide or self-harm - - -   F/U: Safety concerns - - -     CARLOS-7 SCORE 10/7/2021 10/7/2021 5/25/2022   Total Score - - -   Total Score - - 1 (minimal anxiety)   Total Score 3 3 1     Thank you!  Olga DODD RN   [facility] Triage

## 2022-06-02 NOTE — PROGRESS NOTES
"  Assessment & Plan     Injury of toe on left foot, initial encounter    RICE treatment: Rest, Ice, compression, elevation   Motrin for inflammation and pain  Ice compresses  RICE treatment: Rest, Ice, compression, elevation   Return to activity as tolerated  Follow up with PCP as needed    - XR Toe Left G/E 2 Views; Future  - ibuprofen (ADVIL/MOTRIN) 600 MG tablet; Take 1 tablet (600 mg) by mouth every 6 hours as needed       BMI:   Estimated body mass index is 37.76 kg/m  as calculated from the following:    Height as of 10/7/21: 1.626 m (5' 4\").    Weight as of this encounter: 99.8 kg (220 lb).     At today's visit with Hayde Monique , we discussed results, diagnosis, medications and formulated a plan.  We also discussed red flags for immediate return to clinic/ER, as well as indications for follow up if no improvement. Patient understood and agreed to plan. Hayde Monique was discharged with stable vitals and has no further questions.       No follow-ups on file.    Dimitrios Mace [facility], PA-C  [facility] URGENT CARE [facility]    Results for orders placed or performed in visit on 06/02/22   XR Toe Left G/E 2 Views     Status: None    Narrative    EXAM: XR TOE LEFT G/E 2 VIEWS  LOCATION: [facility]  DATE/TIME: 6/2/2022 5:05 PM    INDICATION:  Injury of toe on left foot, initial encounter  COMPARISON: None.      Impression    IMPRESSION: No fracture or dislocation. Very mild degenerative changes at the first MTP joint.         Subjective   Hayde is a 51 year old who presents for the following health issues     HPI     Hayde Monique, 51 year old, female presents to the urgent care today with:   Toe Injury (Pt hurt L middle toe 2 weeks ago and it is still painful )      Review of Systems   Constitutional, HEENT, cardiovascular, pulmonary, gi and gu systems are negative, except as otherwise noted.      Objective    /84   Pulse 77   Resp 18   Wt 99.8 kg (220 lb)   SpO2 99%   BMI " 37.76 kg/m    Body mass index is 37.76 kg/m .  Physical Exam   GENERAL: healthy, alert and no distress  MS: Positive for left 3rd toe, localized tenderness, no swelling present  SKIN: no suspicious lesions or rashes  NEURO: Normal strength and tone, mentation intact and speech normal  PSYCH: mentation appears normal, affect normal/bright

## 2022-06-03 RX ORDER — BUSPIRONE HYDROCHLORIDE 10 MG/1
TABLET ORAL
Qty: 90 TABLET | Refills: 1 | Status: SHIPPED | OUTPATIENT
Start: 2022-06-03 | End: 2022-11-08

## 2022-06-06 ENCOUNTER — MYC MEDICAL ADVICE (OUTPATIENT)
Dept: FAMILY MEDICINE | Facility: CLINIC | Age: 52
End: 2022-06-06
Payer: COMMERCIAL

## 2022-06-07 NOTE — TELEPHONE ENCOUNTER
Please see my chart message below     Please review and advise     Thank you     Sofia Morrison RN, BSN  [facility]

## 2022-06-08 NOTE — TELEPHONE ENCOUNTER
My chart message sent     Sofia Morrison RN, BSN  [facility]  '

## 2022-06-08 NOTE — TELEPHONE ENCOUNTER
It may have been a contusion of the toe which can take several weeks to heal. If it is still bothersome in a couple weeks, I'd recommend follow up with a podiatrist.    Dominik Arizmendi DO  6/8/2022 12:14 PM

## 2022-06-27 ENCOUNTER — OFFICE VISIT (OUTPATIENT)
Dept: PODIATRY | Facility: CLINIC | Age: 52
End: 2022-06-27
Payer: COMMERCIAL

## 2022-06-27 VITALS
HEART RATE: 80 BPM | SYSTOLIC BLOOD PRESSURE: 128 MMHG | BODY MASS INDEX: 37.76 KG/M2 | DIASTOLIC BLOOD PRESSURE: 82 MMHG | RESPIRATION RATE: 16 BRPM | WEIGHT: 220 LBS

## 2022-06-27 DIAGNOSIS — M79.675 PAIN OF TOE OF LEFT FOOT: Primary | ICD-10-CM

## 2022-06-27 PROCEDURE — 99203 OFFICE O/P NEW LOW 30 MIN: CPT | Performed by: PODIATRIST

## 2022-06-27 NOTE — PROGRESS NOTES
"Foot & Ankle Surgery  2022    CC: \"Plantar plate tear?\"    I was asked to see Hayde Monique regarding the chief complaint by:  self    HPI:  Pt is a 51 year old female who presents with above complaint.  5-week history of left third toe pain.  She states she got this caught on a metal planter.  \"Throbbing\", \"hurts to walk and toe hurts\".  Pain 3 out of 10 \"every day\", worse with \"walking\".  \"Taped toes, elevate, ibuprofen\" for treatment.  She states that the left second and third toes now are  slightly.  She had x-rays in urgent care on 2022 that were read as negative for fracture.  She states she injured this toe approximately 10 years ago.    ROS:   Pos for CC.  The patient denies current nausea, vomiting, chills, fevers, belly pain, calf pain, chest pain or SOB.  Complete remainder of ROS is otherwise neg.    VITALS:    Vitals:    22 1432   BP: 128/82   Pulse: 80   Resp: 16   Weight: 99.8 kg (220 lb)       PMH:    Past Medical History:   Diagnosis Date     Anxiety attack      Depressive disorder 2000     Dermatofibroma 2015     Family history of breast cancer      Family history of breast cancer      LSIL (low grade squamous intraepithelial lesion) on Pap smear 10/2010    2012 pap/hpv neg     OCD (obsessive compulsive disorder)      Right lateral epicondylitis 7/10/2020       SXHX:    Past Surgical History:   Procedure Laterality Date     ABDOMEN SURGERY           ARTHROSCOPY SHOULDER Right 2017    Procedure:  Right shoulder arthroscopy and biceps tenolysis.  Arthroscopic subacromial decompresson (acromioplasty, bursectomy and coracoacromial ligament resection). Arthroscopic distal clavicle resection.  Surgeon:  Luca Rodríguez MD  Location: [facility]     BREAST SURGERY      implants       SECTION       COLONOSCOPY N/A 2021    Procedure: COLONOSCOPY;  Surgeon: Kirt Benitez MD;  Location:  GI     COSMETIC SURGERY      " Implants     ENT SURGERY      Tubes     ESOPHAGOSCOPY, GASTROSCOPY, DUODENOSCOPY (EGD), COMBINED N/A 11/30/2021    Procedure: ESOPHAGOGASTRODUODENOSCOPY, WITH BIOPSies;  Surgeon: Kirt Benitez MD;  Location: [facility]     orif leg Right 1995    right lower leg, rodding     ORTHOPEDIC SURGERY Right 1995    Broken Leg - chin and screws     WRIST SURGERY Left 1990    Left wrist, cyst excision        MEDS:    Current Outpatient Medications   Medication     atorvastatin (LIPITOR) 40 MG tablet     busPIRone (BUSPAR) 10 MG tablet     fluticasone (FLONASE) 50 MCG/ACT nasal spray     ibuprofen (ADVIL/MOTRIN) 600 MG tablet     minocycline (MINOCIN) 100 MG capsule     omeprazole (PRILOSEC) 20 MG DR capsule     PARoxetine (PAXIL) 40 MG tablet     No current facility-administered medications for this visit.       ALL:   No Known Allergies    FMH:    Family History   Problem Relation Age of Onset     Cancer Mother 55        Lung cancer     Other Cancer Mother         Lung     Depression Mother      Anxiety Disorder Mother      Rheumatoid Arthritis Sister      Breast Cancer Maternal Grandmother      Rheumatoid Arthritis Paternal Grandmother      Depression Sister      Anxiety Disorder Sister      Colon Cancer No family hx of        SocHx:    Social History     Socioeconomic History     Marital status:      Spouse name: Not on file     Number of children: Not on file     Years of education: Not on file     Highest education level: Not on file   Occupational History     Not on file   Tobacco Use     Smoking status: Former Smoker     Packs/day: 0.00     Years: 1.00     Pack years: 0.00     Types: Cigarettes     Smokeless tobacco: Never Used     Tobacco comment: sept 2021   Vaping Use     Vaping Use: Never used   Substance and Sexual Activity     Alcohol use: Not Currently     Alcohol/week: 0.0 standard drinks     Comment: three times per week about 2 glasses of wine     Drug use: No     Sexual activity: Yes     Partners: Male      Birth control/protection: Male Surgical, None   Other Topics Concern     Parent/sibling w/ CABG, MI or angioplasty before 65F 55M? No      Service No     Blood Transfusions No     Caffeine Concern Yes     Comment: 2 pops a day      Occupational Exposure Not Asked     Hobby Hazards Not Asked     Sleep Concern Not Asked     Stress Concern Not Asked     Weight Concern Not Asked     Special Diet Not Asked     Back Care Not Asked     Exercise Not Asked     Bike Helmet Not Asked     Seat Belt Not Asked     Self-Exams Yes   Social History Narrative     Not on file     Social Determinants of Health     Financial Resource Strain: Not on file   Food Insecurity: Not on file   Transportation Needs: Not on file   Physical Activity: Not on file   Stress: Not on file   Social Connections: Not on file   Intimate Partner Violence: Not on file   Housing Stability: Not on file           EXAMINATION:  Gen:   No apparent distress  Neuro:   A&Ox3, no deficits  Psych:    Answering questions appropriately for age and situation with normal affect  Head:    NCAT  Eye:    Visual scanning without deficit  Ear:    Response to auditory stimuli wnl  Lung:    Non-labored breathing on RA noted  Abd:    NTND per patient report  Lymph:    Neg for pitting/non-pitting edema BLE  Vasc:    Pulses palpable, CFT minimally delayed  Neuro:    Light touch sensation intact to all sensory nerve distributions without paresthesias  Derm:    Neg for nodules, lesions or ulcerations  MSK:    She is point tender at the plantar aspect of the left third metatarsal phalangeal joint/plantar base of the third proximal phalanx.  The Lockman maneuver however is negative for discomfort and joint instability.  Mild pain with resisted flexor tendon firing, but no evidence of flexor tendon pathology  Calf:    Neg for redness, swelling or tenderness      Imaging: X-rays left toe 6/2/2022 - IMPRESSION: No fracture or dislocation. Very mild degenerative changes at the  first MTP joint.    Assessment:  51 year old female with 5-week history of left third toe/third MPJ pain      Plan:  Discussed etiologies, anatomy and options  1.  5-week history of left third toe/third MPJ pain  -I personally reviewed and interpreted the patient's lower extremity history pertinent to today's visit, including imaging/labs, in preparation for initiating a treatment program.  -I reviewed her x-rays.  On the oblique view, there is an irregularity at the plantar medial base of the third proximal phalanx suspicious for fracture.  This is not seen on the AP or MO.  -We discussed a walking cast boot.  She declined and would prefer to wear a comfortable pair of shoes  -Activities as tolerated; modify activities based on pain levels  -RICE/NSAID versus Tylenol.  Based on pain  -Follow-up in 4 weeks for 4 views weightbearing of the foot    Follow up: 4 weeks or sooner with acute issues      Patient's medical history was reviewed today      Felipe Bazzi DPM FACFAS FACFAOM  Podiatric Foot & Ankle Surgeon  [facility]  [phone]    Disclaimer: This note consists of symbols derived from keyboarding, dictation and/or voice recognition software. As a result, there may be errors in the script that have gone undetected. Please consider this when interpreting information found in this chart.

## 2022-06-27 NOTE — NURSING NOTE
Chief Complaint   Patient presents with     Left Foot - Pain      Anastasiya Greenwood MA,CMA,2:32 PM

## 2022-10-21 ENCOUNTER — MYC REFILL (OUTPATIENT)
Dept: FAMILY MEDICINE | Facility: CLINIC | Age: 52
End: 2022-10-21

## 2022-10-21 DIAGNOSIS — E78.5 HYPERLIPIDEMIA WITH TARGET LDL LESS THAN 100: ICD-10-CM

## 2022-10-21 DIAGNOSIS — L70.9 ACNE, UNSPECIFIED ACNE TYPE: ICD-10-CM

## 2022-10-21 DIAGNOSIS — J31.0 RHINITIS, UNSPECIFIED TYPE: Primary | ICD-10-CM

## 2022-10-24 NOTE — TELEPHONE ENCOUNTER
TC- Please call patient to schedule an appointment   Due for an Office visit for further refills of minocycline   Last yearly physical appointment was 10/7/21    Routing refill request to provider for review/approval because:  Drug does not pass the [facility] refill protocol      Recent (12 mo) or future (30 days) visit within the authorizing provider's specialty    Confirmation of diagnosis is required     Thank you!  Olga DODD RN   [facility] Triage

## 2022-10-25 RX ORDER — ATORVASTATIN CALCIUM 40 MG/1
40 TABLET, FILM COATED ORAL DAILY
Qty: 90 TABLET | Refills: 0 | Status: SHIPPED | OUTPATIENT
Start: 2022-10-25 | End: 2022-11-08

## 2022-10-25 RX ORDER — MINOCYCLINE HYDROCHLORIDE 100 MG/1
CAPSULE ORAL
Qty: 180 CAPSULE | Refills: 0 | Status: SHIPPED | OUTPATIENT
Start: 2022-10-25 | End: 2022-11-08

## 2022-10-25 NOTE — TELEPHONE ENCOUNTER
LOV: 10/7/2021   Patient due for physical  No future appt scheduled    Routing to [facility] to assist in scheduling    Rivka Day RN, BSN  [facility]

## 2022-10-25 NOTE — TELEPHONE ENCOUNTER
Spoke with Pt. Will call back to schedule. She is requesting a female dr. Crowe pap and yearly completed together.     Daniel Clement

## 2022-10-25 NOTE — TELEPHONE ENCOUNTER
Prescription approved per [facility] Refill Protocol. [facility]  Team please call to schedule yearly with provider     Itzel Christina RN

## 2022-10-31 RX ORDER — FLUTICASONE PROPIONATE 50 MCG
2 SPRAY, SUSPENSION (ML) NASAL DAILY PRN
Qty: 16 G | Refills: 11 | Status: SHIPPED | OUTPATIENT
Start: 2022-10-31 | End: 2022-11-08

## 2022-11-07 ASSESSMENT — ANXIETY QUESTIONNAIRES
GAD7 TOTAL SCORE: 1
GAD7 TOTAL SCORE: 1
6. BECOMING EASILY ANNOYED OR IRRITABLE: SEVERAL DAYS
5. BEING SO RESTLESS THAT IT IS HARD TO SIT STILL: NOT AT ALL
7. FEELING AFRAID AS IF SOMETHING AWFUL MIGHT HAPPEN: NOT AT ALL
7. FEELING AFRAID AS IF SOMETHING AWFUL MIGHT HAPPEN: NOT AT ALL
IF YOU CHECKED OFF ANY PROBLEMS ON THIS QUESTIONNAIRE, HOW DIFFICULT HAVE THESE PROBLEMS MADE IT FOR YOU TO DO YOUR WORK, TAKE CARE OF THINGS AT HOME, OR GET ALONG WITH OTHER PEOPLE: NOT DIFFICULT AT ALL
2. NOT BEING ABLE TO STOP OR CONTROL WORRYING: NOT AT ALL
4. TROUBLE RELAXING: NOT AT ALL
3. WORRYING TOO MUCH ABOUT DIFFERENT THINGS: NOT AT ALL
1. FEELING NERVOUS, ANXIOUS, OR ON EDGE: NOT AT ALL
8. IF YOU CHECKED OFF ANY PROBLEMS, HOW DIFFICULT HAVE THESE MADE IT FOR YOU TO DO YOUR WORK, TAKE CARE OF THINGS AT HOME, OR GET ALONG WITH OTHER PEOPLE?: NOT DIFFICULT AT ALL
GAD7 TOTAL SCORE: 1

## 2022-11-07 ASSESSMENT — PATIENT HEALTH QUESTIONNAIRE - PHQ9
SUM OF ALL RESPONSES TO PHQ QUESTIONS 1-9: 0
10. IF YOU CHECKED OFF ANY PROBLEMS, HOW DIFFICULT HAVE THESE PROBLEMS MADE IT FOR YOU TO DO YOUR WORK, TAKE CARE OF THINGS AT HOME, OR GET ALONG WITH OTHER PEOPLE: NOT DIFFICULT AT ALL
SUM OF ALL RESPONSES TO PHQ QUESTIONS 1-9: 0

## 2022-11-08 ENCOUNTER — OFFICE VISIT (OUTPATIENT)
Dept: FAMILY MEDICINE | Facility: CLINIC | Age: 52
End: 2022-11-08
Payer: COMMERCIAL

## 2022-11-08 VITALS
SYSTOLIC BLOOD PRESSURE: 120 MMHG | RESPIRATION RATE: 20 BRPM | WEIGHT: 223 LBS | OXYGEN SATURATION: 99 % | TEMPERATURE: 97 F | BODY MASS INDEX: 38.07 KG/M2 | DIASTOLIC BLOOD PRESSURE: 84 MMHG | HEIGHT: 64 IN | HEART RATE: 110 BPM

## 2022-11-08 DIAGNOSIS — N95.0 POST-MENOPAUSAL BLEEDING: ICD-10-CM

## 2022-11-08 DIAGNOSIS — Z13.1 SCREENING FOR DIABETES MELLITUS: ICD-10-CM

## 2022-11-08 DIAGNOSIS — K57.30 DIVERTICULOSIS OF LARGE INTESTINE WITHOUT HEMORRHAGE: ICD-10-CM

## 2022-11-08 DIAGNOSIS — R10.13 DYSPEPSIA: ICD-10-CM

## 2022-11-08 DIAGNOSIS — M25.541 JOINT PAIN IN BOTH HANDS: ICD-10-CM

## 2022-11-08 DIAGNOSIS — F33.2 MAJOR DEPRESSIVE DISORDER, RECURRENT, SEVERE WITHOUT PSYCHOTIC FEATURES (H): ICD-10-CM

## 2022-11-08 DIAGNOSIS — Z12.4 CERVICAL CANCER SCREENING: ICD-10-CM

## 2022-11-08 DIAGNOSIS — Z00.00 ROUTINE GENERAL MEDICAL EXAMINATION AT A HEALTH CARE FACILITY: Primary | ICD-10-CM

## 2022-11-08 DIAGNOSIS — J31.0 RHINITIS, UNSPECIFIED TYPE: ICD-10-CM

## 2022-11-08 DIAGNOSIS — E78.5 HYPERLIPIDEMIA WITH TARGET LDL LESS THAN 100: ICD-10-CM

## 2022-11-08 DIAGNOSIS — F41.1 GENERALIZED ANXIETY DISORDER: ICD-10-CM

## 2022-11-08 DIAGNOSIS — M25.542 JOINT PAIN IN BOTH HANDS: ICD-10-CM

## 2022-11-08 DIAGNOSIS — Z12.31 ENCOUNTER FOR SCREENING MAMMOGRAM FOR BREAST CANCER: ICD-10-CM

## 2022-11-08 LAB
ALBUMIN SERPL-MCNC: 4.2 G/DL (ref 3.4–5)
ALP SERPL-CCNC: 94 U/L (ref 40–150)
ALT SERPL W P-5'-P-CCNC: 74 U/L (ref 0–50)
ANION GAP SERPL CALCULATED.3IONS-SCNC: 9 MMOL/L (ref 3–14)
AST SERPL W P-5'-P-CCNC: 41 U/L (ref 0–45)
BILIRUB SERPL-MCNC: 0.9 MG/DL (ref 0.2–1.3)
BUN SERPL-MCNC: 14 MG/DL (ref 7–30)
CALCIUM SERPL-MCNC: 9.3 MG/DL (ref 8.5–10.1)
CHLORIDE BLD-SCNC: 105 MMOL/L (ref 94–109)
CHOLEST SERPL-MCNC: 231 MG/DL
CO2 SERPL-SCNC: 26 MMOL/L (ref 20–32)
CREAT SERPL-MCNC: 0.65 MG/DL (ref 0.52–1.04)
FASTING STATUS PATIENT QL REPORTED: YES
GFR SERPL CREATININE-BSD FRML MDRD: >90 ML/MIN/1.73M2
GLUCOSE BLD-MCNC: 89 MG/DL (ref 70–99)
HDLC SERPL-MCNC: 52 MG/DL
LDLC SERPL CALC-MCNC: 147 MG/DL
NONHDLC SERPL-MCNC: 179 MG/DL
POTASSIUM BLD-SCNC: 4.1 MMOL/L (ref 3.4–5.3)
PROT SERPL-MCNC: 7.4 G/DL (ref 6.8–8.8)
SODIUM SERPL-SCNC: 140 MMOL/L (ref 133–144)
TRIGL SERPL-MCNC: 161 MG/DL

## 2022-11-08 PROCEDURE — 99396 PREV VISIT EST AGE 40-64: CPT | Mod: 25 | Performed by: NURSE PRACTITIONER

## 2022-11-08 PROCEDURE — 80061 LIPID PANEL: CPT | Performed by: NURSE PRACTITIONER

## 2022-11-08 PROCEDURE — 90682 RIV4 VACC RECOMBINANT DNA IM: CPT | Performed by: NURSE PRACTITIONER

## 2022-11-08 PROCEDURE — 99214 OFFICE O/P EST MOD 30 MIN: CPT | Mod: 25 | Performed by: NURSE PRACTITIONER

## 2022-11-08 PROCEDURE — 90472 IMMUNIZATION ADMIN EACH ADD: CPT | Performed by: NURSE PRACTITIONER

## 2022-11-08 PROCEDURE — 90715 TDAP VACCINE 7 YRS/> IM: CPT | Performed by: NURSE PRACTITIONER

## 2022-11-08 PROCEDURE — 36415 COLL VENOUS BLD VENIPUNCTURE: CPT | Performed by: NURSE PRACTITIONER

## 2022-11-08 PROCEDURE — 90471 IMMUNIZATION ADMIN: CPT | Performed by: NURSE PRACTITIONER

## 2022-11-08 PROCEDURE — G0145 SCR C/V CYTO,THINLAYER,RESCR: HCPCS | Performed by: NURSE PRACTITIONER

## 2022-11-08 PROCEDURE — 80053 COMPREHEN METABOLIC PANEL: CPT | Performed by: NURSE PRACTITIONER

## 2022-11-08 PROCEDURE — 87624 HPV HI-RISK TYP POOLED RSLT: CPT | Performed by: NURSE PRACTITIONER

## 2022-11-08 RX ORDER — BUSPIRONE HYDROCHLORIDE 10 MG/1
10 TABLET ORAL DAILY
Qty: 90 TABLET | Refills: 3 | Status: SHIPPED | OUTPATIENT
Start: 2022-11-08 | End: 2023-10-05

## 2022-11-08 RX ORDER — ATORVASTATIN CALCIUM 40 MG/1
40 TABLET, FILM COATED ORAL DAILY
Qty: 90 TABLET | Refills: 3 | Status: SHIPPED | OUTPATIENT
Start: 2022-11-08 | End: 2023-10-05

## 2022-11-08 RX ORDER — PAROXETINE 40 MG/1
TABLET, FILM COATED ORAL
Qty: 90 TABLET | Refills: 3 | Status: SHIPPED | OUTPATIENT
Start: 2022-11-08 | End: 2023-10-05

## 2022-11-08 RX ORDER — FLUTICASONE PROPIONATE 50 MCG
2 SPRAY, SUSPENSION (ML) NASAL DAILY PRN
Qty: 16 G | Refills: 11 | Status: SHIPPED | OUTPATIENT
Start: 2022-11-08 | End: 2023-10-05

## 2022-11-08 ASSESSMENT — PATIENT HEALTH QUESTIONNAIRE - PHQ9
10. IF YOU CHECKED OFF ANY PROBLEMS, HOW DIFFICULT HAVE THESE PROBLEMS MADE IT FOR YOU TO DO YOUR WORK, TAKE CARE OF THINGS AT HOME, OR GET ALONG WITH OTHER PEOPLE: NOT DIFFICULT AT ALL
SUM OF ALL RESPONSES TO PHQ QUESTIONS 1-9: 0

## 2022-11-08 ASSESSMENT — ANXIETY QUESTIONNAIRES: GAD7 TOTAL SCORE: 1

## 2022-11-08 NOTE — PROGRESS NOTES
Assessment & Plan     Hayde was seen today for recheck medication.    Diagnoses and all orders for this visit:    Routine general medical examination at a health care facility  -     TDAP VACCINE (Adacel, Boostrix)  -     INFLUENZA QUAD, RECOMBINANT, P-FREE (RIV4) (FLUBLOK) AGE 50-64 [[id number]]    Encounter for screening mammogram for breast cancer  -     *MA Screening Digital Bilateral; Future    Screening for diabetes mellitus  -     Comprehensive metabolic panel (BMP + Alb, Alk Phos, ALT, AST, Total. Bili, TP)    Cervical cancer screening  -     Pap Screen with HPV - recommended age 30 - 65 years    Joint pain in both hands  Arthritis vs. Carpal tunnel.  Plan to start hand therapy and continue to monitor.   With no improvement or worsening plan follow-up in clinic for imaging and possible EMG.    -     Occupational Therapy Referral; Future    Post-menopausal bleeding  1 year without menses and then recently had return of menstrual bleeding.  Plan pelvic ultrasound and further consultation with OB/GYN.    -     US Pelvic Complete with Transvaginal; Future  -     Ob/Gyn Referral; Future    Major depressive disorder, recurrent, severe without psychotic features (H)  Generalized anxiety disorder  Stable on Paxil and Buspar.    -     busPIRone (BUSPAR) 10 MG tablet; Take 1 tablet (10 mg) by mouth daily  -     PARoxetine (PAXIL) 40 MG tablet; TAKE ONE TABLET BY MOUTH EVERY DAY    Rhinitis, unspecified type  Well controlled with Flonase.    -     fluticasone (FLONASE) 50 MCG/ACT nasal spray; Spray 2 sprays into both nostrils daily as needed for rhinitis or allergies    Dyspepsia  Stable on PPI.    -     omeprazole (PRILOSEC) 20 MG DR capsule; Take 1 capsule (20 mg) by mouth daily    Hyperlipidemia with target LDL less than 100  Due for fasting lipid panel, encouraged continued consistent use of Atorvastatin 40 mg.    -     atorvastatin (LIPITOR) 40 MG tablet; Take 1 tablet (40 mg) by mouth daily  -     Lipid panel  "reflex to direct LDL Fasting    Other orders  -     REVIEW OF HEALTH MAINTENANCE PROTOCOL ORDERS       BMI:   Estimated body mass index is 38.28 kg/m  as calculated from the following:    Height as of this encounter: 1.626 m (5' 4\").    Weight as of this encounter: 101.2 kg (223 lb).     Return in about 1 year (around 11/8/2023) for Preventative Visit.    Kamilla Taveras, MARY CNP  [facility] PRIOR RYAN Lock is a 52 year old, presenting for the following health issues:  Recheck Medication      History of Present Illness       Mental Health Follow-up:  Patient presents to follow-up on Depression & Anxiety.Patient's depression since last visit has been:  Good  The patient is not having other symptoms associated with depression.  Patient's anxiety since last visit has been:  Good  The patient is not having other symptoms associated with anxiety.  Any significant life events: No  Patient is not feeling anxious or having panic attacks.  Patient has no concerns about alcohol or drug use.    Reason for visit:  RX refill, Flu Shot, pain in both hands and weight    She eats 0-1 servings of fruits and vegetables daily.She consumes 0 sweetened beverage(s) daily.She exercises with enough effort to increase her heart rate 10 to 19 minutes per day.  She exercises with enough effort to increase her heart rate 4 days per week.   She is taking medications regularly.    Today's PHQ-9         PHQ-9 Total Score: 0    PHQ-9 Q9 Thoughts of better off dead/self-harm past 2 weeks :   Not at all    How difficult have these problems made it for you to do your work, take care of things at home, or get along with other people: Not difficult at all  Today's CARLOS-7 Score: 1      patient would like labs today    Bilateral hand pain - worse in left hand, acute onset 3 weeks ago.    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    Feels like her space is set up ergonomically.    Has not had this previously.        LMP:  No " "menses for 1 year and then two weeks ago had menstrual bleeding - felt like a normal period with breast tenderness and cramping.      Preventative care:      Last mammogram in January 2022, normal.   Due for repeat pap smear.   Colonoscopy completed on 11/30/21 - +Diverticulosis, repeat in 10 years.      Review of Systems   Constitutional, HEENT, cardiovascular, pulmonary, gi and gu systems are negative, except as otherwise noted.      Objective    /84   Pulse 110   Temp 97  F (36.1  C) (Tympanic)   Resp 20   Ht 1.626 m (5' 4\")   Wt 101.2 kg (223 lb)   SpO2 99%   BMI 38.28 kg/m    Body mass index is 38.28 kg/m .     Physical Exam     GENERAL: healthy, alert and no distress  EYES: Eyes grossly normal to inspection, PERRL and conjunctivae and sclerae normal  HENT: ear canals and TM's normal, nose and mouth without ulcers or lesions  NECK: no adenopathy, no asymmetry, masses, or scars and thyroid normal to palpation  RESP: lungs clear to auscultation - no rales, rhonchi or wheezes  CV: regular rate and rhythm, normal S1 S2, no S3 or S4, no murmur, click or rub, no peripheral edema  ABDOMEN: soft, nontender, no hepatosplenomegaly, no masses and bowel sounds normal   (female): normal female external genitalia, normal urethral meatus, vaginal mucosa pink, moist, well rugated, and normal cervix/adnexa/uterus without masses or discharge  MS: no gross musculoskeletal defects noted, no edema  SKIN: no suspicious lesions or rashes  NEURO: Normal strength and tone, mentation intact and speech normal  PSYCH: mentation appears normal, affect normal/bright                "

## 2022-11-08 NOTE — PATIENT INSTRUCTIONS

## 2022-11-09 PROBLEM — K57.30 DIVERTICULOSIS OF LARGE INTESTINE: Status: ACTIVE | Noted: 2022-11-09

## 2022-11-10 NOTE — RESULT ENCOUNTER NOTE
Dear Hayde,     -Cholesterol levels are not at your goal levels.  ADVISE: continuing your medication, a regular exercise program with at least 150 minutes of aerobic exercise per week, and eating a low saturated fat/low carbohydrate diet.  Also, you should recheck this fasting cholesterol panel in 3-6 months.    -Liver and gallbladder tests (AST, Alk phos,bilirubin) are normal.  -ALT (liver enzyme) is just slightly elevated.    -Kidney function (GFR) is normal.  -Sodium is normal.  -Potassium is normal.  -Calcium is normal.  -Glucose (diabetic screening test) is normal.      Please send a [employer] message or call [phone]  if you have any questions.      Kamilla Taveras, MARY, CNP  [facility]    If you have further questions about the interpretation of your labs, [email or web address].org is a good website to check out for further information.

## 2022-11-11 LAB
BKR LAB AP GYN ADEQUACY: NORMAL
BKR LAB AP GYN INTERPRETATION: NORMAL
BKR LAB AP HPV REFLEX: NORMAL
BKR LAB AP PREVIOUS ABNORMAL: NORMAL
PATH REPORT.COMMENTS IMP SPEC: NORMAL
PATH REPORT.COMMENTS IMP SPEC: NORMAL
PATH REPORT.RELEVANT HX SPEC: NORMAL

## 2022-11-14 LAB
HUMAN PAPILLOMA VIRUS 16 DNA: NEGATIVE
HUMAN PAPILLOMA VIRUS 18 DNA: NEGATIVE
HUMAN PAPILLOMA VIRUS FINAL DIAGNOSIS: NORMAL
HUMAN PAPILLOMA VIRUS OTHER HR: NEGATIVE

## 2022-11-30 ENCOUNTER — ANCILLARY PROCEDURE (OUTPATIENT)
Dept: ULTRASOUND IMAGING | Facility: CLINIC | Age: 52
End: 2022-11-30
Attending: NURSE PRACTITIONER
Payer: COMMERCIAL

## 2022-11-30 DIAGNOSIS — N95.0 POST-MENOPAUSAL BLEEDING: ICD-10-CM

## 2022-11-30 PROCEDURE — 76856 US EXAM PELVIC COMPLETE: CPT | Performed by: OBSTETRICS & GYNECOLOGY

## 2022-11-30 PROCEDURE — 76830 TRANSVAGINAL US NON-OB: CPT | Performed by: OBSTETRICS & GYNECOLOGY

## 2022-12-02 NOTE — PROGRESS NOTES
Hand Therapy Initial Evaluation    Current Date:  12/5/2022  Referring Provider: MARY Lance CNP  Order Date: 11/8/2022    Diagnosis: Joint pain in both hands (left is worse than right)  DOI: 10/24/2022    Subjective:  Hayde Monique is a 52 year old female.    Answers for HPI/ROS submitted by the patient on 11/28/2022  Reason for Visit:: Hand/knuckle pain  When problem began:: 10/24/2022  How problem occurred:: Getting worse over time  Number scale: 2/10  General health as reported by patient: fair  Please check all that apply to your current or past medical history: depression, history of fractures, overweight  Medical allergies: none  Surgeries: orthopedic surgery  Medications you are currently taking: anti-depressants  Occupation:: [employer]  Laptop and desktop most of the day  What are your primary job tasks: computer work    Occupational Profile Information:  Right hand dominant  Prior functional level:  independent-shared household chores  Patient reports symptoms of pain, stiffness/loss of motion, edema, numbness and tingling   Special tests:  none.    Previous treatment: Voltaren  Transportation: drives  Currently working in normal job without restrictions  Leisure activities/hobbies: none    Functional Outcome Measure:   Upper Extremity Functional Index Score:  SCORE:   Column Totals: /80: (P) 70   (A lower score indicates greater disability.)    Objective:  Pain Level (Scale 0-10):   12/5/2022   At Rest 4/10   With Use 8/10     Pain Description:  Date 12/5/2022   Location [facility] hand, [facility]   Pain Quality Aching, Dull and Tingling   Frequency constant     Pain is worst daytime   Exacerbated by With use   Relieved by none   Progression Unchanged     Posture  Normal    Edema  Mild    Sensation  Decreased Median Nerve distribution per pt report    ROM  Pain Report: - none  + mild    ++ moderate    +++ severe   Wrist 12/5/2022 12/5/2022   AROM (PROM) R L   Extension 75 68   Flexion 74 74   RD 24 18   UD  33 35     Special Tests  Pain Report:  - none    + mild    ++ moderate    +++ severe    12/5/2022   Median Nerve Compression at Pronator R: + @ 15 secs  L: + @ 10 secs   Carpal Compression Test--Durkan Test (30 sec) R: + @ 11 secs  L: -   Mccray Test for Lumbrical Incursion  (fist x 30 secs) R: -  L: -   Tinels at Carpal Tunnel R: -  L: -   Phalens R: -  L: + @ 10 secs     Neural Tension Testing  MNT: Median Neurodynamic Test (based on DS Roberson's ULNT)   12/5/2022   0-5 Scale R: 2/5  L: 3/5    Position:   0/5: Arm across abdomen in coronal plane  1/5: Depress shoulder, ER to neutral ABD shoulder to 45 degrees  2/5: ER shoulder to end range, keep elbow at 90 degrees  3/5: Extend elbow to 0 degrees  4/5: Fully supinate forearm  5/5: Extend wrist, fingers and thumb  Notes:    (+) indicates beyond grade level but less than USP to next level    (-) indicates over USP to level    S1  onset/change of patient's symptoms    S2 definite stop point based on patient's discomfort level    Strength   (Measured in pounds)  Pain Report: - none  + mild    ++ moderate    +++ severe    12/5/2022 12/5/2022   Trials R L   1  2  3 49 44   Average 49 44     Lat Pinch 12/5/2022 12/5/2022   Trials R L   1  2  3 16 13   Average 16 13     3 Pt Pinch 12/5/2022 12/5/2022   Trials R L   1  2  3 11 10   Average 11 10     Assessment:  Patient presents with symptoms consistent with diagnosis of joint pain in both hands, with conservative intervention.     Patient's limitations or Problem List includes:  Pain, Decreased ROM/motion, Increased edema, Sensory disturbance, Decreased  and Tightness in musculature of the bilateral hand which interferes with the patient's ability to perform Sleep Patterns, Recreational Activities, Household Chores and Driving  as compared to previous level of function.    Rehab Potential:  Good - Return to full activity, some limitations    Patient will benefit from skilled Occupational Therapy to increase  ROM, flexibility, motion, overall strength,  strength and sensation and decrease pain and edema to return to previous activity level and resume normal daily tasks and to reach their rehab potential.    Barriers to Learning:  No barrier    Communication Issues:  Patient appears to be able to clearly communicate and understand verbal and written communication and follow directions correctly.    Chart Review: Chart Review and Simple history review with patient    Identified Performance Deficits: driving and community mobility, home establishment and management, meal preparation and cleanup, sleep and leisure activities    Assessment of Occupational Performance:  3-5 Performance Deficits    Clinical Decision Making (Complexity): Low complexity    Treatment Explanation:  The following has been discussed with the patient:  RX ordered/plan of care  Anticipated outcomes  Possible risks and side effects    Plan:  Frequency:  1 X week, once daily  Duration:  for 8 weeks    Treatment Plan:    Modalities:    US and Paraffin   Therapeutic Exercise:    AROM, AAROM, PROM, Tendon Gliding, Isotonics, Isometrics and Stabilization  Neuromuscular re-ed:   Nerve Gliding and Kinesiotaping  Manual Techniques:   Friction massage, Myofascial release and Manual edema mobilization  Orthotic Fabrication:    Static  Self Care:    Self Care Tasks, Ergonomic Considerations and Work Tasks    Discharge Plan:  Achieve all LTG.  Independent in home treatment program.  Reach maximal therapeutic benefit.    Home Exercise Program:  Carpal Tunnel Syndrome Prevention  Orthosis Wear and Care  Warmth  Ball Massage to Flexors  Finger Active Range of Motion Tendon Glides Fist Series  Finger Active Range of Motion FDS Flexor Tendon Gliding  Finger Active Range of Motion DIP Joint Blocking  Nerve Gliding Proximal Median    Next Visit:  Review HEP  Review purchase of wrist supports  US  MFR  Nerve gliding

## 2022-12-05 ENCOUNTER — THERAPY VISIT (OUTPATIENT)
Dept: OCCUPATIONAL THERAPY | Facility: CLINIC | Age: 52
End: 2022-12-05
Attending: NURSE PRACTITIONER
Payer: COMMERCIAL

## 2022-12-05 DIAGNOSIS — M25.541 JOINT PAIN IN BOTH HANDS: ICD-10-CM

## 2022-12-05 DIAGNOSIS — M25.542 JOINT PAIN IN BOTH HANDS: ICD-10-CM

## 2022-12-05 PROCEDURE — 97110 THERAPEUTIC EXERCISES: CPT | Mod: GO

## 2022-12-05 PROCEDURE — 97535 SELF CARE MNGMENT TRAINING: CPT | Mod: GO

## 2022-12-05 PROCEDURE — 97165 OT EVAL LOW COMPLEX 30 MIN: CPT | Mod: GO

## 2022-12-14 ENCOUNTER — MYC MEDICAL ADVICE (OUTPATIENT)
Dept: FAMILY MEDICINE | Facility: CLINIC | Age: 52
End: 2022-12-14

## 2022-12-16 NOTE — TELEPHONE ENCOUNTER
Attempt # 1    Called # [phone] - unable to leave     MORGAN SANTAMARIA RN on 12/16/2022 at 1:17 PM   [facility]

## 2022-12-19 NOTE — TELEPHONE ENCOUNTER
Over the last five months, patient has been having right foot/ankle pain.  Hurts mostly when going to sitting to standing. Once she starts walking she is fine. No other symptoms. Pain feels like a burn.     Pt will call insurance to see if she needs a referral and who is in her network.    Carlos VILLASENOR RN, BSN

## 2022-12-20 ENCOUNTER — OFFICE VISIT (OUTPATIENT)
Dept: PODIATRY | Facility: CLINIC | Age: 52
End: 2022-12-20
Payer: COMMERCIAL

## 2022-12-20 ENCOUNTER — ANCILLARY PROCEDURE (OUTPATIENT)
Dept: GENERAL RADIOLOGY | Facility: CLINIC | Age: 52
End: 2022-12-20
Attending: PODIATRIST
Payer: COMMERCIAL

## 2022-12-20 VITALS — DIASTOLIC BLOOD PRESSURE: 82 MMHG | SYSTOLIC BLOOD PRESSURE: 122 MMHG | WEIGHT: 223 LBS | BODY MASS INDEX: 38.28 KG/M2

## 2022-12-20 DIAGNOSIS — M79.671 RIGHT FOOT PAIN: ICD-10-CM

## 2022-12-20 DIAGNOSIS — Q66.70 PES CAVUS: ICD-10-CM

## 2022-12-20 DIAGNOSIS — M79.671 RIGHT FOOT PAIN: Primary | ICD-10-CM

## 2022-12-20 DIAGNOSIS — M19.071 PRIMARY OSTEOARTHRITIS OF RIGHT ANKLE: ICD-10-CM

## 2022-12-20 PROCEDURE — 73630 X-RAY EXAM OF FOOT: CPT | Mod: TC | Performed by: RADIOLOGY

## 2022-12-20 PROCEDURE — 99213 OFFICE O/P EST LOW 20 MIN: CPT | Performed by: PODIATRIST

## 2022-12-20 NOTE — PATIENT INSTRUCTIONS
Thank you for choosing [facility] Podiatry / Foot & Ankle Surgery!    [facility]AL[facility] CLINIC:  [facility]   [address]   [place] [address]      TRIAGE LINE: [phone]  APPOINTMENTS: [phone]  RADIOLOGY: [phone]  SET UP SURGERY: [phone]  FAX NUMBER: [phone]  BILLING QUESTIONS: [phone]       Follow up: 4-6 weeks if not improved     Super Feet Inserts [place] or [place]     Osteoarthritis of the Foot and Ankle  What Is Osteoarthritis?  Osteoarthritis is a condition characterized by the breakdown and eventual loss of cartilage in one or more joints. Cartilage (the connective tissue found at the end of the bones in the joints) protects and cushions the bones during movement. When cartilage deteriorates or is lost, symptoms develop that can restrict one s ability to easily perform daily activities.  Osteoarthritis is also known as degenerative arthritis, reflecting its nature to develop as part of the aging process. As the most common form of arthritis, osteoarthritis affects millions of Americans. Some people refer to osteoarthritis simply as arthritis, even though there are many different types of arthritis.  Osteoarthritis appears at various joints throughout the body, including the hands, feet, spine, hips, and knees. In the foot, the disease most frequently occurs in the big toe, although it is also often found in the midfoot and ankle.  Causes  Osteoarthritis is considered a  wear and tear  disease because the cartilage in the joint wears down with repeated stress and use over time. As the cartilage deteriorates and gets thinner, the bones lose their protective covering and eventually may rub together, causing pain and inflammation of the joint.  An injury may also lead to osteoarthritis, although it may take months or years after the injury for the condition to develop. For example, osteoarthritis in the big toe is often caused by kicking or jamming the toe,  or by dropping something on the toe. Osteoarthritis in the midfoot is often caused by dropping something on it, or by a sprain or fracture. In the ankle, osteoarthritis is usually caused by a fracture and occasionally by a severe sprain.  Sometimes osteoarthritis develops as a result of abnormal foot mechanics such as flat feet or high arches. A flat foot causes less stability in the ligaments (bands of tissue that connect bones), resulting in excessive strain on the joints, which can cause arthritis. A high arch is rigid and lacks mobility, causing a jamming of joints that creates an increased risk of arthritis.  Symptoms  People with osteoarthritis in the foot or ankle experience, in varying degrees, one or more of the following:  Pain and stiffness in the joint   Swelling in or near the joint   Difficulty walking or bending the joint   Some patients with osteoarthritis also develop a bone spur (a bony protrusion) at the affected joint. Shoe pressure may cause pain at the site of a bone spur, and in some cases blisters or calluses may form over its surface. Bone spurs can also limit the movement of the joint.  Diagnosis  In diagnosing osteoarthritis, the foot and ankle surgeon will examine the foot thoroughly, looking for swelling in the joint, limited mobility, and pain with movement. In some cases, deformity and/or enlargement (spur) of the joint may be noted. X-rays may be ordered to evaluate the extent of the disease.  Non-surgical Treatment  To help relieve symptoms, the surgeon may begin treating osteoarthritis with one or more of the following non-surgical approaches:  Oral medications. Nonsteroidal anti-inflammatory drugs (NSAIDs), such as ibuprofen, are often helpful in reducing the inflammation and pain. Occasionally a prescription for a steroid medication is needed to adequately reduce symptoms.   Orthotic devices. Custom orthotic devices (shoe inserts) are often prescribed to provide support to improve  the foot s mechanics or cushioning to help minimize pain.   Bracing. Bracing, which restricts motion and supports the joint, can reduce pain during walking and help prevent further deformity.   Immobilization. Protecting the foot from movement by wearing a cast or removable cast-boot may be necessary to allow the inflammation to resolve.   Steroid injections. In some cases, steroid injections are applied to the affected joint to deliver anti-inflammatory medication.   Physical therapy. Exercises to strengthen the muscles, especially when the osteoarthritis occurs in the ankle, may give the patient greater stability and help avoid injury that might worsen the condition.   When Is Surgery Needed?  When osteoarthritis has progressed substantially or failed to improve with non-surgical treatment, surgery may be recommended. In advanced cases, surgery may be the only option. The goal of surgery is to decrease pain and improve function. The foot and ankle surgeon will consider a number of factors when selecting the procedure best suited to the patient s condition and lifestyle.       Please call to schedule your MRI/CT/Ultrasound/Arthrogram appointment.  The number is [phone].    For EMG (Motor Nerve Testing):  Please call [facility]:  [phone], option #2

## 2022-12-20 NOTE — LETTER
2022         RE: Hayde Monique  [address] [place] [address]        Dear Colleague,    Thank you for referring your patient, Hayde Monique, to the [facility]OD[facility]. Please see a copy of my visit note below.    PATIENT HISTORY:   Hayde Monique is a 52 year old female who presents to clinic for pain to the right foot and ankle.  She notes is been going on for 3 months.  Is a burning hurts when she walks or rotates her foot.  She is tried stretching and ibuprofen.  Does help a little bit after walking on it. Can be 8/10 pain.   Denies specific injury.  Wondering what is causing the pain and what can be done for it.    Review of Systems:  Patient denies fever, chills, rash, wound,  limping, numbness, weakness, heart burn, blood in stool, chest pain with activity, calf pain when walking, shortness of breath with activity, chronic cough, easy bleeding/bruising, swelling of ankles, excessive thirst, fatigue, depression, anxiety.  Patient admits to stiffness.     PAST MEDICAL HISTORY:   Past Medical History:   Diagnosis Date     Anxiety attack      Depressive disorder      Dermatofibroma 2015     Family history of breast cancer      Family history of breast cancer      LSIL (low grade squamous intraepithelial lesion) on Pap smear 10/2010    2012 pap/hpv neg     OCD (obsessive compulsive disorder)      Right lateral epicondylitis 7/10/2020        PAST SURGICAL HISTORY:   Past Surgical History:   Procedure Laterality Date     ABDOMEN SURGERY           ARTHROSCOPY SHOULDER Right 2017    Procedure:  Right shoulder arthroscopy and biceps tenolysis.  Arthroscopic subacromial decompresson (acromioplasty, bursectomy and coracoacromial ligament resection). Arthroscopic distal clavicle resection.  Surgeon:  Luca Rodríguez MD  Location: [facility]     BREAST SURGERY      implants       SECTION       COLONOSCOPY N/A 2021     Procedure: COLONOSCOPY;  Surgeon: Kirt Benitez MD;  Location:  GI     COSMETIC SURGERY  1995    Implants     ENT SURGERY      Tubes     ESOPHAGOSCOPY, GASTROSCOPY, DUODENOSCOPY (EGD), COMBINED N/A 11/30/2021    Procedure: ESOPHAGOGASTRODUODENOSCOPY, WITH BIOPSies;  Surgeon: Kirt Benitez MD;  Location:  GI     orif leg Right 1995    right lower leg, rodding     ORTHOPEDIC SURGERY Right 1995    Broken Leg - chin and screws     WRIST SURGERY Left 1990    Left wrist, cyst excision        MEDICATIONS:   Current Outpatient Medications:      atorvastatin (LIPITOR) 40 MG tablet, Take 1 tablet (40 mg) by mouth daily, Disp: 90 tablet, Rfl: 3     busPIRone (BUSPAR) 10 MG tablet, Take 1 tablet (10 mg) by mouth daily, Disp: 90 tablet, Rfl: 3     fluticasone (FLONASE) 50 MCG/ACT nasal spray, Spray 2 sprays into both nostrils daily as needed for rhinitis or allergies, Disp: 16 g, Rfl: 11     minocycline (DYNACIN) 100 MG tablet, Take 1 tablet (100 mg) by mouth daily, Disp: 90 tablet, Rfl: 0     omeprazole (PRILOSEC) 20 MG DR capsule, Take 1 capsule (20 mg) by mouth daily, Disp: 90 capsule, Rfl: 3     PARoxetine (PAXIL) 40 MG tablet, TAKE ONE TABLET BY MOUTH EVERY DAY, Disp: 90 tablet, Rfl: 3     ALLERGIES:  No Known Allergies     SOCIAL HISTORY:   Social History     Socioeconomic History     Marital status:      Spouse name: Not on file     Number of children: Not on file     Years of education: Not on file     Highest education level: Not on file   Occupational History     Not on file   Tobacco Use     Smoking status: Former     Packs/day: 0.00     Years: 1.00     Pack years: 0.00     Types: Cigarettes     Smokeless tobacco: Never     Tobacco comments:     sept 2021   Vaping Use     Vaping Use: Never used   Substance and Sexual Activity     Alcohol use: Not Currently     Alcohol/week: 0.0 standard drinks     Comment: three times per week about 2 glasses of wine     Drug use: No     Sexual activity: Yes      Partners: Male     Birth control/protection: Male Surgical, None   Other Topics Concern     Parent/sibling w/ CABG, MI or angioplasty before 65F 55M? No      Service No     Blood Transfusions No     Caffeine Concern Yes     Comment: 2 pops a day      Occupational Exposure Not Asked     Hobby Hazards Not Asked     Sleep Concern Not Asked     Stress Concern Not Asked     Weight Concern Not Asked     Special Diet Not Asked     Back Care Not Asked     Exercise Not Asked     Bike Helmet Not Asked     Seat Belt Not Asked     Self-Exams Yes   Social History Narrative     Not on file     Social Determinants of Health     Financial Resource Strain: Not on file   Food Insecurity: Not on file   Transportation Needs: Not on file   Physical Activity: Not on file   Stress: Not on file   Social Connections: Not on file   Intimate Partner Violence: Not on file   Housing Stability: Not on file        FAMILY HISTORY:   Family History   Problem Relation Age of Onset     Cancer Mother 55        Lung cancer     Other Cancer Mother         Lung     Depression Mother      Anxiety Disorder Mother      Rheumatoid Arthritis Sister      Breast Cancer Maternal Grandmother      Rheumatoid Arthritis Paternal Grandmother      Depression Sister      Anxiety Disorder Sister      Colon Cancer No family hx of         EXAM:Vitals: Wt 101.2 kg (223 lb)   BMI 38.28 kg/m    BMI= Body mass index is 38.28 kg/m .    General appearance: Patient is alert and fully cooperative with history & exam.  No sign of distress is noted during the visit.     Psychiatric: Affect is pleasant & appropriate.  Patient appears motivated to improve health.     Respiratory: Breathing is regular & unlabored while sitting.     HEENT: Hearing is intact to spoken word.  Speech is clear.  No gross evidence of visual impairment that would impact ambulation.     Dermatologic: Skin is intact to both lower extremities without significant lesions, rash or abrasion.  No  paronychia or evidence of soft tissue infection is noted.     Vascular: DP & PT pulses are intact & regular bilaterally.  No significant edema or varicosities noted.  CFT and skin temperature is normal to both lower extremities.     Neurologic: Lower extremity sensation is intact to light touch.  No evidence of weakness or contracture in the lower extremities.  No evidence of neuropathy.     Musculoskeletal: Patient is ambulatory without assistive device or brace. Increased arch height. Pain on palpation of of the right dorsal lateral talo navicular joint.  Pain with plantarflexion of the right foot.     Radiographs: left toe xray -  I personally reviewed the xrays - No fracture or dislocation. Very mild degenerative changes at the first MTP joint.     ASSESSMENT:    Right foot pain  Primary osteoarthritis of right ankle  Pes cavus     Medical Decision Making/Plan:  Reviewed [facility] chart in [facility].  Reviewed and discussed x-rays. Talked about arthritis and treatments including orthotics, injections, icing, NSAIDS, bracing, physical therapy, compounding pain cream, or surgical intervention.    At this time recommend an x-ray guided injection into the talonavicular joint to see if this helps with pain.  If no relief for 2 to 3 weeks would recommend an MRI of the ankle.  Also recommend inserts in the shoes and topical pain cream.  All questions were answered to patient's satisfaction she will call for the questions or concerns.    Patient risk factor: Patient is at low  risk for infection.        Kim Stewart DPM, Podiatry/Foot and Ankle Surgery        Again, thank you for allowing me to participate in the care of your patient.        Sincerely,        Kim Stewart DPM, Podiatry/Foot and Ankle Surgery

## 2022-12-20 NOTE — PROGRESS NOTES
PATIENT HISTORY:   Hayde Monique is a 52 year old female who presents to clinic for pain to the right foot and ankle.  She notes is been going on for 3 months.  Is a burning hurts when she walks or rotates her foot.  She is tried stretching and ibuprofen.  Does help a little bit after walking on it. Can be 8/10 pain.   Denies specific injury.  Wondering what is causing the pain and what can be done for it.    Review of Systems:  Patient denies fever, chills, rash, wound,  limping, numbness, weakness, heart burn, blood in stool, chest pain with activity, calf pain when walking, shortness of breath with activity, chronic cough, easy bleeding/bruising, swelling of ankles, excessive thirst, fatigue, depression, anxiety.  Patient admits to stiffness.     PAST MEDICAL HISTORY:   Past Medical History:   Diagnosis Date     Anxiety attack      Depressive disorder      Dermatofibroma 2015     Family history of breast cancer      Family history of breast cancer      LSIL (low grade squamous intraepithelial lesion) on Pap smear 10/2010    2012 pap/hpv neg     OCD (obsessive compulsive disorder)      Right lateral epicondylitis 7/10/2020        PAST SURGICAL HISTORY:   Past Surgical History:   Procedure Laterality Date     ABDOMEN SURGERY           ARTHROSCOPY SHOULDER Right 2017    Procedure:  Right shoulder arthroscopy and biceps tenolysis.  Arthroscopic subacromial decompresson (acromioplasty, bursectomy and coracoacromial ligament resection). Arthroscopic distal clavicle resection.  Surgeon:  Luca Rodríguez MD  Location: [facility]     BREAST SURGERY      implants       SECTION       COLONOSCOPY N/A 2021    Procedure: COLONOSCOPY;  Surgeon: Kirt Benitez MD;  Location:  GI     COSMETIC SURGERY      Implants     ENT SURGERY      Tubes     ESOPHAGOSCOPY, GASTROSCOPY, DUODENOSCOPY (EGD), COMBINED N/A 2021    Procedure: ESOPHAGOGASTRODUODENOSCOPY,  WITH BIOPSies;  Surgeon: Kirt Benitez MD;  Location: [facility]     orif leg Right 1995    right lower leg, rodding     ORTHOPEDIC SURGERY Right 1995    Broken Leg - chin and screws     WRIST SURGERY Left 1990    Left wrist, cyst excision        MEDICATIONS:   Current Outpatient Medications:      atorvastatin (LIPITOR) 40 MG tablet, Take 1 tablet (40 mg) by mouth daily, Disp: 90 tablet, Rfl: 3     busPIRone (BUSPAR) 10 MG tablet, Take 1 tablet (10 mg) by mouth daily, Disp: 90 tablet, Rfl: 3     fluticasone (FLONASE) 50 MCG/ACT nasal spray, Spray 2 sprays into both nostrils daily as needed for rhinitis or allergies, Disp: 16 g, Rfl: 11     minocycline (DYNACIN) 100 MG tablet, Take 1 tablet (100 mg) by mouth daily, Disp: 90 tablet, Rfl: 0     omeprazole (PRILOSEC) 20 MG DR capsule, Take 1 capsule (20 mg) by mouth daily, Disp: 90 capsule, Rfl: 3     PARoxetine (PAXIL) 40 MG tablet, TAKE ONE TABLET BY MOUTH EVERY DAY, Disp: 90 tablet, Rfl: 3     ALLERGIES:  No Known Allergies     SOCIAL HISTORY:   Social History     Socioeconomic History     Marital status:      Spouse name: Not on file     Number of children: Not on file     Years of education: Not on file     Highest education level: Not on file   Occupational History     Not on file   Tobacco Use     Smoking status: Former     Packs/day: 0.00     Years: 1.00     Pack years: 0.00     Types: Cigarettes     Smokeless tobacco: Never     Tobacco comments:     sept 2021   Vaping Use     Vaping Use: Never used   Substance and Sexual Activity     Alcohol use: Not Currently     Alcohol/week: 0.0 standard drinks     Comment: three times per week about 2 glasses of wine     Drug use: No     Sexual activity: Yes     Partners: Male     Birth control/protection: Male Surgical, None   Other Topics Concern     Parent/sibling w/ CABG, MI or angioplasty before 65F 55M? No      Service No     Blood Transfusions No     Caffeine Concern Yes     Comment: 2 pops a day       Occupational Exposure Not Asked     Hobby Hazards Not Asked     Sleep Concern Not Asked     Stress Concern Not Asked     Weight Concern Not Asked     Special Diet Not Asked     Back Care Not Asked     Exercise Not Asked     Bike Helmet Not Asked     Seat Belt Not Asked     Self-Exams Yes   Social History Narrative     Not on file     Social Determinants of Health     Financial Resource Strain: Not on file   Food Insecurity: Not on file   Transportation Needs: Not on file   Physical Activity: Not on file   Stress: Not on file   Social Connections: Not on file   Intimate Partner Violence: Not on file   Housing Stability: Not on file        FAMILY HISTORY:   Family History   Problem Relation Age of Onset     Cancer Mother 55        Lung cancer     Other Cancer Mother         Lung     Depression Mother      Anxiety Disorder Mother      Rheumatoid Arthritis Sister      Breast Cancer Maternal Grandmother      Rheumatoid Arthritis Paternal Grandmother      Depression Sister      Anxiety Disorder Sister      Colon Cancer No family hx of         EXAM:Vitals: Wt 101.2 kg (223 lb)   BMI 38.28 kg/m    BMI= Body mass index is 38.28 kg/m .    General appearance: Patient is alert and fully cooperative with history & exam.  No sign of distress is noted during the visit.     Psychiatric: Affect is pleasant & appropriate.  Patient appears motivated to improve health.     Respiratory: Breathing is regular & unlabored while sitting.     HEENT: Hearing is intact to spoken word.  Speech is clear.  No gross evidence of visual impairment that would impact ambulation.     Dermatologic: Skin is intact to both lower extremities without significant lesions, rash or abrasion.  No paronychia or evidence of soft tissue infection is noted.     Vascular: DP & PT pulses are intact & regular bilaterally.  No significant edema or varicosities noted.  CFT and skin temperature is normal to both lower extremities.     Neurologic: Lower extremity  sensation is intact to light touch.  No evidence of weakness or contracture in the lower extremities.  No evidence of neuropathy.     Musculoskeletal: Patient is ambulatory without assistive device or brace. Increased arch height. Pain on palpation of of the right dorsal lateral talo navicular joint.  Pain with plantarflexion of the right foot.     Radiographs: left toe xray -  I personally reviewed the xrays - No fracture or dislocation. Very mild degenerative changes at the first MTP joint.     ASSESSMENT:    Right foot pain  Primary osteoarthritis of right ankle  Pes cavus     Medical Decision Making/Plan:  Reviewed patient's chart in [facility].  Reviewed and discussed x-rays. Talked about arthritis and treatments including orthotics, injections, icing, NSAIDS, bracing, physical therapy, compounding pain cream, or surgical intervention.    At this time recommend an x-ray guided injection into the talonavicular joint to see if this helps with pain.  If no relief for 2 to 3 weeks would recommend an MRI of the ankle.  Also recommend inserts in the shoes and topical pain cream.  All questions were answered to patient's satisfaction she will call for the questions or concerns.    Patient risk factor: Patient is at low  risk for infection.        Kim Stewart DPM, Podiatry/Foot and Ankle Surgery

## 2022-12-22 ENCOUNTER — MYC MEDICAL ADVICE (OUTPATIENT)
Dept: FAMILY MEDICINE | Facility: CLINIC | Age: 52
End: 2022-12-22

## 2022-12-22 ENCOUNTER — HOSPITAL ENCOUNTER (OUTPATIENT)
Facility: CLINIC | Age: 52
End: 2022-12-22
Payer: COMMERCIAL

## 2022-12-27 NOTE — TELEPHONE ENCOUNTER
Please see my chart message below     Please review and advise     Thank you     Sofia Morrison RN, BSN  [facility]

## 2022-12-27 NOTE — TELEPHONE ENCOUNTER
Previous result note completed on 12/6/22 for pelvic ultrasound completed on 11/30/22.      It looks like there were attempts to reach patient, but we were unable to reach patient.     Please call and send MyChart response with my recommendations.     - Romulo, CNP

## 2022-12-27 NOTE — TELEPHONE ENCOUNTER
My chart message sent     Sofia Morrison RN, BSN  [facility]

## 2023-01-02 NOTE — TELEPHONE ENCOUNTER
Please see my chart message below     Please review and advise     Thank you     Sofia Morrison RN, BSN  [facility]

## 2023-01-04 NOTE — TELEPHONE ENCOUNTER
Please inform patient:      OB/GYN will plan to repeat pelvic ultrasound at follow-up appointment on 2/21/23.   Last mammogram was January 2022 - due for repeat annual mammogram.     - Romulo, CNP

## 2023-01-04 NOTE — TELEPHONE ENCOUNTER
Please see my chart message below     Please review and advise     Thank you     Sofia Morrison RN, BSN  [facility]

## 2023-01-11 ENCOUNTER — HOSPITAL ENCOUNTER (OUTPATIENT)
Dept: GENERAL RADIOLOGY | Facility: CLINIC | Age: 53
Discharge: HOME | End: 2023-01-11
Attending: PODIATRIST | Admitting: PODIATRIST
Payer: COMMERCIAL

## 2023-01-11 VITALS — DIASTOLIC BLOOD PRESSURE: 87 MMHG | HEART RATE: 70 BPM | SYSTOLIC BLOOD PRESSURE: 165 MMHG

## 2023-01-11 DIAGNOSIS — M19.071 PRIMARY OSTEOARTHRITIS OF RIGHT ANKLE: ICD-10-CM

## 2023-01-11 DIAGNOSIS — Q66.70 PES CAVUS: ICD-10-CM

## 2023-01-11 DIAGNOSIS — M79.671 RIGHT FOOT PAIN: ICD-10-CM

## 2023-01-11 PROCEDURE — 255N000002 HC RX 255 OP 636: Performed by: PODIATRIST

## 2023-01-11 PROCEDURE — 20605 DRAIN/INJ JOINT/BURSA W/O US: CPT | Mod: RT

## 2023-01-11 PROCEDURE — 250N000011 HC RX IP 250 OP 636

## 2023-01-11 PROCEDURE — 250N000009 HC RX 250

## 2023-01-11 RX ORDER — TRIAMCINOLONE ACETONIDE 40 MG/ML
INJECTION, SUSPENSION INTRA-ARTICULAR; INTRAMUSCULAR
Status: COMPLETED
Start: 2023-01-11 | End: 2023-01-11

## 2023-01-11 RX ORDER — IOPAMIDOL 408 MG/ML
10 INJECTION, SOLUTION INTRATHECAL ONCE
Status: COMPLETED | OUTPATIENT
Start: 2023-01-11 | End: 2023-01-11

## 2023-01-11 RX ORDER — BUPIVACAINE HYDROCHLORIDE 5 MG/ML
INJECTION, SOLUTION EPIDURAL; INTRACAUDAL
Status: COMPLETED
Start: 2023-01-11 | End: 2023-01-11

## 2023-01-11 RX ORDER — LIDOCAINE HYDROCHLORIDE 10 MG/ML
INJECTION, SOLUTION EPIDURAL; INFILTRATION; INTRACAUDAL; PERINEURAL
Status: COMPLETED
Start: 2023-01-11 | End: 2023-01-11

## 2023-01-11 RX ADMIN — LIDOCAINE HYDROCHLORIDE 5 ML: 10 INJECTION, SOLUTION EPIDURAL; INFILTRATION; INTRACAUDAL; PERINEURAL at 13:44

## 2023-01-11 RX ADMIN — TRIAMCINOLONE ACETONIDE 40 MG: 40 INJECTION, SUSPENSION INTRA-ARTICULAR; INTRAMUSCULAR at 13:47

## 2023-01-11 RX ADMIN — IOPAMIDOL 1 ML: 408 INJECTION, SOLUTION INTRATHECAL at 13:46

## 2023-01-11 RX ADMIN — BUPIVACAINE HYDROCHLORIDE 1 ML: 5 INJECTION, SOLUTION EPIDURAL; INTRACAUDAL; PERINEURAL at 13:47

## 2023-01-11 NOTE — PROGRESS NOTES
RADIOLOGY PROCEDURE NOTE  Patient name: Hayde Monique  MRN: [id number]  : 1970    Pre-procedure diagnosis: Right talonavicular joint pain  Post-procedure diagnosis: Same    Procedure Date/Time: 2023  2:05 PM  Procedure: Right talonavicular joint steroid injection  Estimated blood loss: None  Specimen(s) collected with description: none  The patient tolerated the procedure well with no immediate complications.  Significant findings:none    See imaging dictation for procedural details.    Provider name: Jacinto Rodriguez PA-C  Assistant(s):None

## 2023-01-11 NOTE — PROGRESS NOTES
Pt was in Radiology today for a right lateral talonavicular joint steroid injection. Pt tolerated ortho procedure well. Pre procedure pain was 0 post procedure pain level 0.Procedure was completed by FARIHA ANSARI. There were no complications during this procedure. Pt verbalized understanding of written and verbal instructions and left department in stable and satisfactory condition with son. There is no evidence of bleeding or any other complications upon discharge.

## 2023-02-21 ENCOUNTER — OFFICE VISIT (OUTPATIENT)
Dept: OBGYN | Facility: CLINIC | Age: 53
End: 2023-02-21
Payer: COMMERCIAL

## 2023-02-21 VITALS
WEIGHT: 225 LBS | SYSTOLIC BLOOD PRESSURE: 124 MMHG | DIASTOLIC BLOOD PRESSURE: 80 MMHG | BODY MASS INDEX: 38.41 KG/M2 | HEIGHT: 64 IN

## 2023-02-21 DIAGNOSIS — N95.0 POSTMENOPAUSAL BLEEDING: Primary | ICD-10-CM

## 2023-02-21 DIAGNOSIS — L72.3 SEBACEOUS CYST: ICD-10-CM

## 2023-02-21 PROCEDURE — 88305 TISSUE EXAM BY PATHOLOGIST: CPT | Performed by: PATHOLOGY

## 2023-02-21 PROCEDURE — 99203 OFFICE O/P NEW LOW 30 MIN: CPT | Mod: 25 | Performed by: OBSTETRICS & GYNECOLOGY

## 2023-02-21 PROCEDURE — 58100 BIOPSY OF UTERUS LINING: CPT | Performed by: OBSTETRICS & GYNECOLOGY

## 2023-02-21 NOTE — NURSING NOTE
"Chief Complaint   Patient presents with     Follow Up     Endometrial biopsy.        Initial /80   Ht 1.626 m (5' 4\")   Wt 102.1 kg (225 lb)   BMI 38.62 kg/m   Estimated body mass index is 38.62 kg/m  as calculated from the following:    Height as of this encounter: 1.626 m (5' 4\").    Weight as of this encounter: 102.1 kg (225 lb).  BP completed using cuff size: regular    Questioned patient about current smoking habits.  Pt. has never smoked.          The following HM Due: NONE      The following patient reported/Care Every where data was sent to:  P ABSTRACT QUALITY INITIATIVES [[id number]]  Liz Dover LPN               "

## 2023-02-23 LAB
PATH REPORT.COMMENTS IMP SPEC: NORMAL
PATH REPORT.COMMENTS IMP SPEC: NORMAL
PATH REPORT.FINAL DX SPEC: NORMAL
PATH REPORT.GROSS SPEC: NORMAL
PATH REPORT.MICROSCOPIC SPEC OTHER STN: NORMAL
PATH REPORT.RELEVANT HX SPEC: NORMAL
PHOTO IMAGE: NORMAL

## 2023-02-25 DIAGNOSIS — L70.9 ACNE, UNSPECIFIED ACNE TYPE: ICD-10-CM

## 2023-02-27 NOTE — TELEPHONE ENCOUNTER
Abdominal Pain    Abdominal pain is pain in the stomach or belly area. Everyone has this pain from time to time. In many cases it goes away on its own. But abdominal pain can sometimes be due to a serious problem, such as appendicitis. So its important to know when to seek help.  Causes of abdominal pain  There are many possible causes of abdominal pain. Common causes in adults include:  Constipation, diarrhea, or gas  Stomach acid flowing back up into the esophagus (acid reflux or heartburn)  Severe acid reflux, called GERD (gastroesophageal reflux disease)  A sore in the lining of the stomach or small intestine (peptic ulcer)  Inflammation of the gallbladder, liver, or pancreas  Gallstones or kidney stones  Appendicitis   Intestinal blockage   An internal organ pushing through a muscle or other tissue (hernia)  Urinary tract infections  In women, menstrual cramps, fibroids, or endometriosis  Inflammation or infection of the intestines  Diagnosing the cause of abdominal pain  Your healthcare provider will do a physical exam help find the cause of your pain. If needed, tests will be ordered. Belly pain has many possible causes. So it can be hard to find the reason for your pain. Giving details about your pain can help. Tell your provider where and when you feel the pain, and what makes it better or worse. Also let your provider know if you have other symptoms such as:  Fever  Tiredness  Upset stomach (nausea)  Vomiting  Changes in bathroom habits  Treating abdominal pain  Some causes of pain need emergency medical treatment right away. These include appendicitis or a bowel blockage. Other problems can be treated with rest, fluids, or medicines. Your healthcare provider can give you specific instructions for treatment or self-care based on what is causing your pain.  If you have vomiting or diarrhea, sip water or other clear fluids. When you are ready to eat solid foods again, start with small amounts of  Sent mychart with provider response   easy-to-digest, low-fat foods. These include apple sauce, toast, or crackers.   When to seek medical care  Call 911 or go to the hospital right away if you:  Cant pass stool and are vomiting  Are vomiting blood or have bloody diarrhea or black, tarry diarrhea  Have chest, neck, or shoulder pain  Feel like you might pass out  Have pain in your shoulder blades with nausea  Have sudden, severe belly pain  Have new, severe pain unlike any you have felt before  Have a belly that is rigid, hard, and tender to touch  Call your healthcare provider if you have:  Pain for more than 5 days  Bloating for more than 2 days  Diarrhea for more than 5 days  A fever of 100.4°F (38.0°C) or higher, or as directed by your provider  Pain that gets worse  Weight loss for no reason  Continued lack of appetite  Blood in your stool  How to prevent abdominal pain  Here are some tips to help prevent abdominal pain:  Eat smaller amounts of food at one time.  Avoid greasy, fried, or other high-fat foods.  Avoid foods that give you gas.  Exercise regularly.  Drink plenty of fluids.  To help prevent GERD symptoms:  Quit smoking.  Reduce alcohol and certain foods that increase stomach acid.  Avoid aspirin and over-the-counter pain and fever medicines (NSAIDS or nonsteroidal anti-inflammatory drugs), if possible  Lose extra weight.  Finish eating at least 2 hours before you go to bed or lie down.  Raise the head of your bed.  Date Last Reviewed: 7/1/2016  © 4032-1930 [employer]. [address] [place] [address], [place] [address]. All rights reserved. This information is not intended as a substitute for professional medical care. Always follow your healthcare professional's instructions.         Lower GI Bleeding (Stable)  You have signs of blood in your stool. This is called rectal bleeding. The bleeding may have begun in another part of your gastrointestinal (GI) tract. If the blood is bright red, it is likely coming from the lower part of  the GI tract. If the blood is black or dark, it might be coming from higher up in the GI tract. Very small amounts of GI bleeding may not be visible and can only be discovered during a test on your stool. Possible causes of lower GI bleeding include:  Hemorrhoids  Anal fissures  Diverticulitis  Inflammatory bowel disease (Crohn's disease or ulcerative colitis)  Polyps (growths) in the intestine  Note: Iron supplements and medicines for diarrhea or upset stomach can cause black stools. Foods such as licorice and red beets can also discolor the stool and be mistaken for bleeding. These are not bleeding and are not a cause for alarm.  Home care  You have not lost a large amount of blood and your condition appears stable at this time. You may resume normal activity as long as you feel well.  Avoid NSAIDs, such as aspirin, ibuprofen, or naproxen. They can irritate the stomach and cause further bleeding. If you are taking these medicines for other medical reasons, talk to your healthcare provider before you stop them.   Follow-up care  Follow up with your healthcare provider as advised. Further tests may be needed to find the cause of your bleeding.  When to seek medical advice  Call your healthcare provider for any of the following:  Large amount of rectal bleeding   Increasing abdominal pain  Weakness, dizziness  Call 911  Get emergency medical care if any of the following occur:  Loss of consciousness  Vomiting blood  Date Last Reviewed: 6/24/2015 © 2000-2017 [employer]. [address] [place] [address]. All rights reserved. This information is not intended as a substitute for professional medical care. Always follow your healthcare professional's instructions.         Understanding Diverticulosis and Diverticulitis     Pouches or diverticula usually occur in the lower part of the colon called the sigmoid.     The colon (large intestine) is the last part of the digestive tract. It absorbs water  from stool and changes it from a liquid to a solid. In certain cases, small pouches called diverticula can form in the colon wall. This condition is called diverticulosis. The pouches can become infected. If this happens, it becomes a more serious problem called diverticulitis. These problems can be painful. But they can be managed.  Managing your condition  Diet changes or medicines may be prescribed.   If you have diverticulosis  Recommendations include:  Diet changes are often enough to control symptoms. The main changes are adding fiber (roughage) and drinking more water. Fiber absorbs water as it travels through your colon. This helps your stool stay soft and move smoothly. Water helps this process.  If needed, you may be told to take over-the-counter stool softeners.  To help relieve pain, antispasmodic medicines may be prescribed.  Watch for changes in your bowel movements. Tell the healthcare provider if you notice any changes.  Begin an exercise program. Ask your healthcare provider how to get started.  Get plenty of rest and sleep.   If you have diverticulitis  Treatment depends on how bad your symptoms are.  For mild symptoms. You may be put on a liquid diet for a short time. Antibiotics are usually prescribed. If these two steps relieve your symptoms, you may then be prescribed a high-fiber diet. If you still have symptoms, your healthcare provider will discuss more treatment choices with you.  For severe symptoms. You may need to be admitted to the hospital. There, you can be given IV antibiotics and fluids. You will also be put on a low-fiber or liquid diet. Although not common, surgery is needed in some people with severe symptoms.  [place] to colon health     Diverticulitis occurs when the pouches become infected or inflamed.     Help keep your colon healthy with a diet that includes plenty of high-fiber fruits, vegetables, and whole grains. Drink plenty of liquids like water and juice. Maintain a  "healthy lifestyle including regular exercise, stress management, and adequate rest and sleep.   Date Last Reviewed: 7/1/2016  © 2843-2316 [employer]. [address] [place] [address], [place] [address]. All rights reserved. This information is not intended as a substitute for professional medical care. Always follow your healthcare professional's instructions.         Vomiting (Adult)  Vomiting is a common symptom that may be due to different causes. These include gastroenteritis ("stomach flu"), food poisoning and gastritis. There are other more serious causes of vomiting which may be hard to diagnose early in the illness. Therefore, it is important to watch for the warning signs listed below.  The main danger from repeated vomiting is dehydration. This is due to excess loss of water and minerals from the body. When this occurs, body fluids must be replaced.  Home care  If symptoms are severe, rest at home for the next 24 hours.  Because your symptoms may be from an infection, wash your hands frequently and well, and use alcohol-based  to avoid spreading the infection to others.  Wash your hands for at least 20 seconds. Hum the happy birthday song twice for the correct length of time.  Wash your hands after using the toilet, before and after preparing food, before eating food, after changing a diaper, cleaning a wound, caring for a sick person, and blowing your nose, coughing, or sneezing. You should also wash your hands after caring for someone who is sick, touching pet food, or treats, and touching an animal, or animal waste.  You may use acetaminophen or NSAID medicines like ibuprofen or naproxen to control fever, unless another medicine was prescribed. If you have chronic liver or kidney disease or ever had a stomach ulcer or GI bleeding, talk with your doctor before using these medicines. Aspirin should never be used in anyone under 18 years of age who is ill with a fever. It may cause severe " liver damage. Don't use NSAID medicines if you are already taking one for another condition (like arthritis) or are on aspirin (such as for heart disease, or after a stroke)  Avoid tobacco and alcohol use, which may worsen your symptoms.  If medicines for vomiting were prescribed, take as directed.  Once vomiting stops, then follow these guidelines:  During the first 12 to 24 hours follow the diet below:  Fruit juices. Apple, grape juice, clear fruit drinks, and electrolyte replacement drinks.  Beverages. Soft drinks without caffeine; mineral water (plain or flavored), decaffeinated tea and coffee.  Soups. Clear broth, consommé and bouillon  Desserts. Plain gelatin, popsicles and fruit juice bars. As you feel better, you may add 6-8 ounces of yogurt per day.  During the next 24 hours you may add the following to the above:  Hot cereal, plain toast, bread, rolls, crackers  Plain noodles, rice, mashed potatoes, chicken noodle or rice soup  Unsweetened canned fruit (avoid pineapple), bananas  Limit caffeine and chocolate. No spices or seasonings except salt.  During the next 24 hours:  Gradually resume a normal diet, as you feel better and your symptoms lessen.  Follow-up care  Follow up with your healthcare provider, or as advised.  When to seek medical advice  Call your healthcare provider right away if any of these occur:  Constant right-sided lower abdominal pain or increasing general abdominal pain  Continued vomiting (unable to keep liquids down) for 24 hours  Frequent diarrhea (more than 5 times a day); blood (red or black color) or mucus in diarrhea  Reduced urine output or extreme thirst  Weakness, dizziness or fainting  Unusually drowsy or confused  Fever of 100.4°F (38°C) oral or higher, or as directed  Yellow color of the eyes or skin  Date Last Reviewed: 11/16/2015 © 2000-2017 [employer]. [address] [place] [address], [place] [address]. All rights reserved. This information is not intended as a  substitute for professional medical care. Always follow your healthcare professional's instructions.     GERD (Adult)    The esophagus is a tube that carries food from the mouth to the stomach. A valve at the lower end of the esophagus prevents stomach acid from flowing upward. When this valve doesn't work properly, stomach contents may repeatedly flow back up (reflux) into the esophagus. This is called gastroesophageal reflux disease (GERD). GERD can irritate the esophagus. It can cause problems with swallowing or breathing. In severe cases, GERD can cause recurrent pneumonia or other serious problems.  Symptoms of reflux include burning, pressure or sharp pain in the upper abdomen or mid to lower chest. The pain can spread to the neck, back, or shoulder. There may be belching, an acid taste in the back of the throat, chronic cough, or sore throat or hoarseness. GERD symptoms often occur during the day after a big meal. They can also occur at night when lying down.   Home care  Lifestyle changes can help reduce symptoms. If needed, medicines may be prescribed. Symptoms often improve with treatment, but if treatment is stopped, the symptoms often return after a few months. So most persons with GERD will need to continue treatment.  Lifestyle changes  Limit or avoid fatty, fried, and spicy foods, as well as coffee, chocolate, mint, and foods with high acid content such as tomatoes and citrus fruit and juices (orange, grapefruit, lemon).  Dont eat large meals, especially at night. Frequent, smaller meals are best. Do not lie down right after eating. And dont eat anything 3 hours before going to bed.  Avoid drinking alcohol and smoking. As much as possible, stay away from second hand smoke.  If you are overweight, losing weight will reduce symptoms.   Avoid wearing tight clothing around your stomach area.  If your symptoms occur during sleep, use a foam wedge to elevate your upper body (not just your head.) Or, place  "4" blocks under the head of your bed.  Medicines  If needed, medicines can help relieve the symptoms of GERD and prevent damage to the esophagus. Discuss a medicine plan with your healthcare provider. This may include one or more of the following medicines:  Antacids to help neutralize the normal acids in your stomach.  Acid blockers (H2 blockers) to decrease acid production.  Acid inhibitors (PPIs) to decrease acid production in a different way than the blockers. They may work better, but can take a little longer to take effect.  Take an antacid 30-60 minutes after eating and at bedtime, but not at the same time as an acid blocker.  Try not to take medicines such as ibuprofen and aspirin. If you are taking aspirin for your heart or other medical reasons, talk to your healthcare provider about stopping it.  Follow-up care  Follow up with your healthcare provider or as advised by our staff.  When to seek medical advice  Call your healthcare provider if any of the following occur:  Stomach pain gets worse or moves to the lower right abdomen (appendix area)  Chest pain appears or gets worse, or spreads to the back, neck, shoulder, or arm  Frequent vomiting (cant keep down liquids)  Blood in the stool or vomit (red or black in color)  Feeling weak or dizzy  Fever of 100.4ºF (38ºC) or higher, or as directed by your healthcare provider  Date Last Reviewed: 6/23/2015  © 2080-9382 [employer]. [address] [place] [address], [place] [address]. All rights reserved. This information is not intended as a substitute for professional medical care. Always follow your healthcare professional's instructions.  "

## 2023-02-28 NOTE — TELEPHONE ENCOUNTER
Routing refill request to provider for review/approval because:  Drug not active on patient's medication list    The original prescription was discontinued on 2/21/2023 by Liz Dover LPN for the following reason: Med Rec(No AVS / No eCancel). Renewing this prescription may not be appropriate.      Kassy Van R.N.

## 2023-03-01 RX ORDER — MINOCYCLINE HYDROCHLORIDE 100 MG/1
TABLET ORAL
Qty: 90 TABLET | Refills: 0 | Status: SHIPPED | OUTPATIENT
Start: 2023-03-01 | End: 2023-10-05

## 2023-03-17 ENCOUNTER — MYC MEDICAL ADVICE (OUTPATIENT)
Dept: FAMILY MEDICINE | Facility: CLINIC | Age: 53
End: 2023-03-17
Payer: COMMERCIAL

## 2023-04-22 ENCOUNTER — HEALTH MAINTENANCE LETTER (OUTPATIENT)
Age: 53
End: 2023-04-22

## 2023-04-27 ENCOUNTER — TELEPHONE (OUTPATIENT)
Dept: GENERAL RADIOLOGY | Facility: CLINIC | Age: 53
End: 2023-04-27
Payer: COMMERCIAL

## 2023-04-27 NOTE — TELEPHONE ENCOUNTER
Called pt to review upcoming procedure, mailbox was full, unable to leave a message. The exam is straightforward and she does not need to stop any medications.

## 2023-05-03 ENCOUNTER — HOSPITAL ENCOUNTER (OUTPATIENT)
Dept: GENERAL RADIOLOGY | Facility: CLINIC | Age: 53
Discharge: HOME | End: 2023-05-03
Attending: PODIATRIST | Admitting: PODIATRIST
Payer: COMMERCIAL

## 2023-05-03 VITALS — DIASTOLIC BLOOD PRESSURE: 85 MMHG | SYSTOLIC BLOOD PRESSURE: 136 MMHG | OXYGEN SATURATION: 98 % | HEART RATE: 86 BPM

## 2023-05-03 DIAGNOSIS — M79.671 RIGHT FOOT PAIN: ICD-10-CM

## 2023-05-03 DIAGNOSIS — M19.071 ARTHRITIS OF RIGHT FOOT: ICD-10-CM

## 2023-05-03 PROCEDURE — 77002 NEEDLE LOCALIZATION BY XRAY: CPT

## 2023-05-03 PROCEDURE — 250N000009 HC RX 250

## 2023-05-03 PROCEDURE — 255N000002 HC RX 255 OP 636: Performed by: PODIATRIST

## 2023-05-03 PROCEDURE — 250N000011 HC RX IP 250 OP 636

## 2023-05-03 RX ORDER — IOPAMIDOL 408 MG/ML
10 INJECTION, SOLUTION INTRATHECAL ONCE
Status: COMPLETED | OUTPATIENT
Start: 2023-05-03 | End: 2023-05-03

## 2023-05-03 RX ORDER — BUPIVACAINE HYDROCHLORIDE 5 MG/ML
INJECTION, SOLUTION EPIDURAL; INTRACAUDAL
Status: COMPLETED
Start: 2023-05-03 | End: 2023-05-03

## 2023-05-03 RX ORDER — BUPIVACAINE HYDROCHLORIDE 5 MG/ML
1 INJECTION, SOLUTION EPIDURAL; INTRACAUDAL ONCE
Status: COMPLETED | OUTPATIENT
Start: 2023-05-03 | End: 2023-05-03

## 2023-05-03 RX ORDER — LIDOCAINE HYDROCHLORIDE 10 MG/ML
INJECTION, SOLUTION EPIDURAL; INFILTRATION; INTRACAUDAL; PERINEURAL
Status: COMPLETED
Start: 2023-05-03 | End: 2023-05-03

## 2023-05-03 RX ORDER — TRIAMCINOLONE ACETONIDE 40 MG/ML
40 INJECTION, SUSPENSION INTRA-ARTICULAR; INTRAMUSCULAR ONCE
Status: COMPLETED | OUTPATIENT
Start: 2023-05-03 | End: 2023-05-03

## 2023-05-03 RX ORDER — TRIAMCINOLONE ACETONIDE 40 MG/ML
INJECTION, SUSPENSION INTRA-ARTICULAR; INTRAMUSCULAR
Status: COMPLETED
Start: 2023-05-03 | End: 2023-05-03

## 2023-05-03 RX ORDER — LIDOCAINE HYDROCHLORIDE 10 MG/ML
5 INJECTION, SOLUTION EPIDURAL; INFILTRATION; INTRACAUDAL; PERINEURAL ONCE
Status: COMPLETED | OUTPATIENT
Start: 2023-05-03 | End: 2023-05-03

## 2023-05-03 RX ADMIN — LIDOCAINE HYDROCHLORIDE 3 ML: 10 INJECTION, SOLUTION EPIDURAL; INFILTRATION; INTRACAUDAL; PERINEURAL at 14:41

## 2023-05-03 RX ADMIN — TRIAMCINOLONE ACETONIDE 40 MG: 40 INJECTION, SUSPENSION INTRA-ARTICULAR; INTRAMUSCULAR at 14:42

## 2023-05-03 RX ADMIN — BUPIVACAINE HYDROCHLORIDE 1 ML: 5 INJECTION, SOLUTION EPIDURAL; INTRACAUDAL at 14:42

## 2023-05-03 RX ADMIN — BUPIVACAINE HYDROCHLORIDE 1 ML: 5 INJECTION, SOLUTION EPIDURAL; INTRACAUDAL; PERINEURAL at 14:42

## 2023-05-03 RX ADMIN — IOPAMIDOL 1 ML: 408 INJECTION, SOLUTION INTRATHECAL at 14:41

## 2023-05-03 NOTE — PROGRESS NOTES
Pt was in Radiology today for a right lateral talonavicular joint steroid injection. Pt tolerated ortho procedure well. Pre procedure pain was 5/10 post procedure pain level 0. Procedure was completed by FARIHA ANSARI. There were no complications during this procedure. Pt verbalized understanding of written and verbal instructions and left department in stable and satisfactory condition with son. There is no evidence of bleeding or any other complications upon discharge.

## 2023-05-03 NOTE — PROGRESS NOTES
RADIOLOGY PROCEDURE NOTE  Patient name: Hayde Monique  MRN: [id number]  : 1970    Pre-procedure diagnosis: Right foot pain  Post-procedure diagnosis: Same    Procedure Date/Time: May 3, 2023  2:37 PM  Procedure: Right talonavicular joint steroid injection  Estimated blood loss: None  Specimen(s) collected with description: none  The patient tolerated the procedure well with no immediate complications.  Significant findings:none    See imaging dictation for procedural details.    Provider name: Jacinto Rodriguez PA-C  Assistant(s):None

## 2023-05-23 ENCOUNTER — VIRTUAL VISIT (OUTPATIENT)
Dept: OBGYN | Facility: CLINIC | Age: 53
End: 2023-05-23
Payer: COMMERCIAL

## 2023-05-23 DIAGNOSIS — N95.0 POSTMENOPAUSAL BLEEDING: Primary | ICD-10-CM

## 2023-05-23 PROCEDURE — 99213 OFFICE O/P EST LOW 20 MIN: CPT | Mod: 95 | Performed by: OBSTETRICS & GYNECOLOGY

## 2023-05-23 NOTE — NURSING NOTE
Hayde Monique is a 52 year old female who is being evaluated via a billable telephone visit.      What phone number would you like to be contacted at? [phone]  How would you like to obtain your AVS? Dragan      Hayde Monique is a 52 year old female who presents for virtual visit today for the following health issue(s):  No chief complaint on file.  Telephone visit.    Additional information: Patient is conducting phone visit form her home in [place].

## 2023-05-23 NOTE — PROGRESS NOTES
SUBJECTIVE:                                                   Hayde Monique is a 52 year old female who presents for phone visit today to follow up for another episode of postmenopausal bleeding. Please see my last note for complete details.    To recap, she had an episode of postmenopausal bleeding after more than 1 year without a period.  Ultrasound showed a 9 mm lining, so she underwent endometrial biopsy which was negative.  Last week she had 3 days of bleeding, light and associated with menstrual type cramps.  She has no bleeding now.  We discussed that she would need further follow-up, and the appointment was scheduled today to discuss this.    Problem list and histories reviewed & adjusted, as indicated.  Additional history: as documented.    Patient Active Problem List   Diagnosis     OCD (obsessive compulsive disorder)     Anxiety attack     Major depression     Generalized anxiety disorder     Melasma     Family history of breast cancer     Class 1 obesity due to excess calories without serious comorbidity with body mass index (BMI) of 32.0 to 32.9 in adult     Right shoulder pain     Acne, unspecified acne type     Right lateral epicondylitis     Diverticulosis of large intestine     Joint pain in both hands     Past Surgical History:   Procedure Laterality Date     ABDOMEN SURGERY           ARTHROSCOPY SHOULDER Right 2017    Procedure:  Right shoulder arthroscopy and biceps tenolysis.  Arthroscopic subacromial decompresson (acromioplasty, bursectomy and coracoacromial ligament resection). Arthroscopic distal clavicle resection.  Surgeon:  Luca Rodríguez MD  Location: [facility]     BREAST SURGERY      implants       SECTION       COLONOSCOPY N/A 2021    Procedure: COLONOSCOPY;  Surgeon: Kirt Benitez MD;  Location:  GI     COSMETIC SURGERY      Implants     ENT SURGERY      Tubes     ESOPHAGOSCOPY, GASTROSCOPY, DUODENOSCOPY (EGD), COMBINED  N/A 11/30/2021    Procedure: ESOPHAGOGASTRODUODENOSCOPY, WITH BIOPSies;  Surgeon: Kirt Benitez MD;  Location: [facility]     orif leg Right 1995    right lower leg, rodding     ORTHOPEDIC SURGERY Right 1995    Broken Leg - chin and screws     WRIST SURGERY Left 1990    Left wrist, cyst excision      Social History     Tobacco Use     Smoking status: Former     Packs/day: 0.00     Years: 1.00     Pack years: 0.00     Types: Cigarettes     Smokeless tobacco: Never     Tobacco comments:     sept 2021   Vaping Use     Vaping status: Never Used   Substance Use Topics     Alcohol use: Not Currently     Alcohol/week: 0.0 standard drinks of alcohol     Comment: three times per week about 2 glasses of wine      Problem (# of Occurrences) Relation (Name,Age of Onset)    Anxiety Disorder (2) Mother (Shaista Ibrahim), Sister (Aguilar Lackey)    Cancer (1) Mother (Shaista Ibrahim, 55): Lung cancer    Depression (2) Mother (Shaista Ibrahim), Sister (Aguilar Lackey)    Breast Cancer (1) Maternal Grandmother (Adelina Maharaj)    Other Cancer (1) Mother (Shaista Ibrahim): Lung    Rheumatoid Arthritis (2) Sister, Paternal Grandmother       Negative family history of: Colon Cancer            atorvastatin (LIPITOR) 40 MG tablet, Take 1 tablet (40 mg) by mouth daily  busPIRone (BUSPAR) 10 MG tablet, Take 1 tablet (10 mg) by mouth daily  fluticasone (FLONASE) 50 MCG/ACT nasal spray, Spray 2 sprays into both nostrils daily as needed for rhinitis or allergies  minocycline (DYNACIN) 100 MG tablet, TAKE 1 CAPSULE BY MOUTH DAILY  omeprazole (PRILOSEC) 20 MG DR capsule, Take 1 capsule (20 mg) by mouth daily  PARoxetine (PAXIL) 40 MG tablet, TAKE ONE TABLET BY MOUTH EVERY DAY    No current facility-administered medications on file prior to visit.    No Known Allergies    ROS:  Negative except as noted in HPI.    OBJECTIVE:     No exam was necessary today as this was entirely a counseling appointment on the phone.    In-Clinic Test Results:  No  results found for this or any previous visit (from the past 24 hour(s)).    ASSESSMENT/PLAN:                                                        ICD-10-CM    1. Postmenopausal bleeding  N95.0             My recommendation is for hysteroscopy with dilation and curettage in the operating room.  If the fibroid or polyp is encountered, this can be treated at the same time with the MyoSure, which we discussed.  Risk, benefits, and alternatives were reviewed in detail, and she would like to get this scheduled.  We discussed preoperative and postoperative expectations and recovery.    Brenda Acuña MD  [facility] [place]

## 2023-05-24 ENCOUNTER — TELEPHONE (OUTPATIENT)
Dept: OBGYN | Facility: CLINIC | Age: 53
End: 2023-05-24
Payer: COMMERCIAL

## 2023-05-24 NOTE — TELEPHONE ENCOUNTER
Surgeon:JORGE ALBERTO STARR   Location: [facility]   Date/time preference:  per patient     Surgery:  hysteroscopy, dilation and curettage with Myosure   Length of Surgery:  30 min   Diagnosis:  postmenopausal bleeding   Anesthesia type:  choice     Special instructions / equipment:  Myosure available   Am admit or same day: SAME DAY   Bowel prep: No   Pre op: PCP   Office visit with surgeon prior to surgery: No   Schedule postop visit: 2-4  weeks     ThanksJorge Alberto MD

## 2023-05-25 NOTE — TELEPHONE ENCOUNTER
Type of surgery: hysteroscopy d&c with myosure  Location of surgery: [facility]  Date and time of surgery: 5/31/23 @ 10:45 am  Surgeon: Dr. Acuña  Pre-Op Appt Date: Patient advised to schedule.  Post-Op Appt Date: 6/22/23   Packet sent out: Yes  Pre-cert/Authorization completed:  No  Date: 5/25/23

## 2023-05-26 ENCOUNTER — TELEPHONE (OUTPATIENT)
Dept: FAMILY MEDICINE | Facility: CLINIC | Age: 53
End: 2023-05-26
Payer: COMMERCIAL

## 2023-05-26 NOTE — TELEPHONE ENCOUNTER
Patient calls, she was just scheduled for surgery with Dr. Acuña, OB for 5/31 for hysteroscopy at [facility]. She needs pre-op done, but there are no openings. Can patient be worked in for appointment? Patient states that she can be flexible with time of appointment if she can be worked in.     Maryan Telles RN  [facility]

## 2023-05-26 NOTE — TELEPHONE ENCOUNTER
Next 5 appointments (look out 90 days)    May 30, 2023  1:00 PM  (Arrive by 12:40 PM)  Pre-Operative Physical with Dominik Arizmendi DO  [facility] ([facility] ) [address] [place] [id number]  [phone]   Jun 22, 2023 11:15 AM  SHORT with Brenda Acuña MD  [facility] ([facility] ) [address] [place] [address]  [phone]         Appt has been scheduled.     MORGAN SANTAMARIA RN on 5/26/2023 at 2:34 PM   [facility]

## 2023-05-30 ENCOUNTER — OFFICE VISIT (OUTPATIENT)
Dept: FAMILY MEDICINE | Facility: CLINIC | Age: 53
End: 2023-05-30
Payer: COMMERCIAL

## 2023-05-30 VITALS
BODY MASS INDEX: 39.09 KG/M2 | TEMPERATURE: 96.3 F | WEIGHT: 229 LBS | RESPIRATION RATE: 16 BRPM | DIASTOLIC BLOOD PRESSURE: 80 MMHG | OXYGEN SATURATION: 99 % | HEART RATE: 99 BPM | SYSTOLIC BLOOD PRESSURE: 134 MMHG | HEIGHT: 64 IN

## 2023-05-30 DIAGNOSIS — F33.2 MAJOR DEPRESSIVE DISORDER, RECURRENT, SEVERE WITHOUT PSYCHOTIC FEATURES (H): ICD-10-CM

## 2023-05-30 DIAGNOSIS — Z01.818 PREOPERATIVE EXAMINATION: Primary | ICD-10-CM

## 2023-05-30 DIAGNOSIS — F41.1 GENERALIZED ANXIETY DISORDER: ICD-10-CM

## 2023-05-30 DIAGNOSIS — N95.0 POSTMENOPAUSAL BLEEDING: ICD-10-CM

## 2023-05-30 LAB
ERYTHROCYTE [DISTWIDTH] IN BLOOD BY AUTOMATED COUNT: 13.1 % (ref 10–15)
HCT VFR BLD AUTO: 38.8 % (ref 35–47)
HGB BLD-MCNC: 12.6 G/DL (ref 11.7–15.7)
MCH RBC QN AUTO: 29.7 PG (ref 26.5–33)
MCHC RBC AUTO-ENTMCNC: 32.5 G/DL (ref 31.5–36.5)
MCV RBC AUTO: 92 FL (ref 78–100)
PLATELET # BLD AUTO: 216 10E3/UL (ref 150–450)
RBC # BLD AUTO: 4.24 10E6/UL (ref 3.8–5.2)
WBC # BLD AUTO: 7.8 10E3/UL (ref 4–11)

## 2023-05-30 PROCEDURE — 36416 COLLJ CAPILLARY BLOOD SPEC: CPT | Performed by: FAMILY MEDICINE

## 2023-05-30 PROCEDURE — 85027 COMPLETE CBC AUTOMATED: CPT | Performed by: FAMILY MEDICINE

## 2023-05-30 PROCEDURE — 99214 OFFICE O/P EST MOD 30 MIN: CPT | Performed by: FAMILY MEDICINE

## 2023-05-30 ASSESSMENT — ANXIETY QUESTIONNAIRES
6. BECOMING EASILY ANNOYED OR IRRITABLE: NOT AT ALL
5. BEING SO RESTLESS THAT IT IS HARD TO SIT STILL: NOT AT ALL
3. WORRYING TOO MUCH ABOUT DIFFERENT THINGS: NOT AT ALL
2. NOT BEING ABLE TO STOP OR CONTROL WORRYING: NOT AT ALL
7. FEELING AFRAID AS IF SOMETHING AWFUL MIGHT HAPPEN: NOT AT ALL
GAD7 TOTAL SCORE: 0
7. FEELING AFRAID AS IF SOMETHING AWFUL MIGHT HAPPEN: NOT AT ALL
1. FEELING NERVOUS, ANXIOUS, OR ON EDGE: NOT AT ALL
8. IF YOU CHECKED OFF ANY PROBLEMS, HOW DIFFICULT HAVE THESE MADE IT FOR YOU TO DO YOUR WORK, TAKE CARE OF THINGS AT HOME, OR GET ALONG WITH OTHER PEOPLE?: NOT DIFFICULT AT ALL
4. TROUBLE RELAXING: NOT AT ALL
IF YOU CHECKED OFF ANY PROBLEMS ON THIS QUESTIONNAIRE, HOW DIFFICULT HAVE THESE PROBLEMS MADE IT FOR YOU TO DO YOUR WORK, TAKE CARE OF THINGS AT HOME, OR GET ALONG WITH OTHER PEOPLE: NOT DIFFICULT AT ALL
GAD7 TOTAL SCORE: 0
GAD7 TOTAL SCORE: 0

## 2023-05-30 NOTE — PROGRESS NOTES
[address] [address]  Phone: [phone]  Primary Provider: Dominik Arizmendi  Pre-op Performing Provider: DOMINIK ARIZMENDI      PREOPERATIVE EVALUATION:  Today's date: 5/30/2023    Hayde Monique is a 52 year old female who presents for a preoperative evaluation.    Surgical Information:  Surgery/Procedure: Hysteroscopy d&c  Surgery Location: [facility]  Surgeon: Dr Acuña  Surgery Date: 05/31/2023  Time of Surgery: 10:45 AM  Where patient plans to recover: At home with family  Fax number for surgical facility: Note does not need to be faxed, will be available electronically in Epic.    Assessment & Plan     The proposed surgical procedure is considered INTERMEDIATE risk.    Preoperative examination  - CBC with platelets; Future  - CBC with platelets    Postmenopausal bleeding  - CBC with platelets; Future  - CBC with platelets    Major depressive disorder, recurrent, severe without psychotic features (H)    Generalized anxiety disorder              - No identified additional risk factors other than previously addressed    Antiplatelet or Anticoagulation Medication Instructions:   - Patient is on no antiplatelet or anticoagulation medications.    Additional Medication Instructions:  Patient is to take all scheduled medications on the day of surgery    RECOMMENDATION:  APPROVAL GIVEN to proceed with proposed procedure, without further diagnostic evaluation.      Subjective       HPI related to upcoming procedure: history of postmenopausal bleeding.         5/28/2023    10:18 AM   Preop Questions   1. Have you ever had a heart attack or stroke? No   2. Have you ever had surgery on your heart or blood vessels, such as a stent placement, a coronary artery bypass, or surgery on an artery in your head, neck, heart, or legs? No   3. Do you have chest pain with activity? No   4. Do you have a history of  heart failure? No   5. Do you currently  have a cold, bronchitis or symptoms of other infection? No   6. Do you have a cough, shortness of breath, or wheezing? No   7. Do you or anyone in your family have previous history of blood clots? No   8. Do you or does anyone in your family have a serious bleeding problem such as prolonged bleeding following surgeries or cuts? No   9. Have you ever had problems with anemia or been told to take iron pills? No   10. Have you had any abnormal blood loss such as black, tarry or bloody stools, or abnormal vaginal bleeding? YES - postmenopausal bleeding   11. Have you ever had a blood transfusion? YES - after birth of son 21 years ago   11a. Have you ever had a transfusion reaction? No   12. Are you willing to have a blood transfusion if it is medically needed before, during, or after your surgery? Yes   13. Have you or any of your relatives ever had problems with anesthesia? No   14. Do you have sleep apnea, excessive snoring or daytime drowsiness? No   15. Do you have any artifical heart valves or other implanted medical devices like a pacemaker, defibrillator, or continuous glucose monitor? No   16. Do you have artificial joints? No, has chin in right leg   17. Are you allergic to latex? No   18. Is there any chance that you may be pregnant? No       Health Care Directive:  Patient does not have a Health Care Directive or Living Will:     Preoperative Review of :   reviewed - no record of controlled substances prescribed.      Status of Chronic Conditions:  See problem list for active medical problems.  Problems all longstanding and stable, except as noted/documented.  See ROS for pertinent symptoms related to these conditions.      Review of Systems  CONSTITUTIONAL: NEGATIVE for fever, chills, change in weight  INTEGUMENTARY/SKIN: NEGATIVE for worrisome rashes, moles or lesions  EYES: NEGATIVE for vision changes or irritation  ENT/MOUTH: NEGATIVE for ear, mouth and throat problems  RESP: NEGATIVE for significant  cough or SOB  CV: NEGATIVE for chest pain, palpitations or peripheral edema  GI: NEGATIVE for nausea, abdominal pain, heartburn, or change in bowel habits  : NEGATIVE for frequency, dysuria, or hematuria  MUSCULOSKELETAL: NEGATIVE for significant arthralgias or myalgia  NEURO: NEGATIVE for weakness, dizziness or paresthesias  ENDOCRINE: NEGATIVE for temperature intolerance, skin/hair changes  HEME: NEGATIVE for bleeding problems  PSYCHIATRIC: NEGATIVE for changes in mood or affect    Patient Active Problem List    Diagnosis Date Noted     Joint pain in both hands 2022     Priority: Medium     Diverticulosis of large intestine 2022     Priority: Medium     Right lateral epicondylitis 07/10/2020     Priority: Medium     Acne, unspecified acne type 10/15/2019     Priority: Medium     Right shoulder pain 2017     Priority: Medium     Class 1 obesity due to excess calories without serious comorbidity with body mass index (BMI) of 32.0 to 32.9 in adult 2017     Priority: Medium     Family history of breast cancer 2017     Priority: Medium     Maternal grandmother       Melasma 2015     Priority: Medium     Generalized anxiety disorder 2015     Priority: Medium     Diagnosis updated by automated process. Provider to review and confirm.       Major depression 2014     Priority: Medium     OCD (obsessive compulsive disorder)      Priority: Medium     Anxiety attack      Priority: Medium      Past Medical History:   Diagnosis Date     Anxiety attack      Cancer (H) 2010     Depressive disorder 2000     Dermatofibroma 2015     Family history of breast cancer      Family history of breast cancer      LSIL (low grade squamous intraepithelial lesion) on Pap smear 10/2010    2012 pap/hpv neg     OCD (obsessive compulsive disorder)      Right lateral epicondylitis 07/10/2020     Past Surgical History:   Procedure Laterality Date     ABDOMEN SURGERY            ARTHROSCOPY SHOULDER Right 2017    Procedure:  Right shoulder arthroscopy and biceps tenolysis.  Arthroscopic subacromial decompresson (acromioplasty, bursectomy and coracoacromial ligament resection). Arthroscopic distal clavicle resection.  Surgeon:  Luca Rodríguez MD  Location: [facility]     BREAST SURGERY      implants       SECTION       COLONOSCOPY N/A 2021    Procedure: COLONOSCOPY;  Surgeon: Kirt Benitez MD;  Location:  GI     COSMETIC SURGERY      Implants     ENT SURGERY      Tubes     ESOPHAGOSCOPY, GASTROSCOPY, DUODENOSCOPY (EGD), COMBINED N/A 2021    Procedure: ESOPHAGOGASTRODUODENOSCOPY, WITH BIOPSies;  Surgeon: Kirt Benitez MD;  Location:  GI     orif leg Right     right lower leg, rodding     ORTHOPEDIC SURGERY Right     Broken Leg - chin and screws     WRIST SURGERY Left     Left wrist, cyst excision     Current Outpatient Medications   Medication Sig Dispense Refill     atorvastatin (LIPITOR) 40 MG tablet Take 1 tablet (40 mg) by mouth daily 90 tablet 3     busPIRone (BUSPAR) 10 MG tablet Take 1 tablet (10 mg) by mouth daily 90 tablet 3     omeprazole (PRILOSEC) 20 MG DR capsule Take 1 capsule (20 mg) by mouth daily 90 capsule 3     PARoxetine (PAXIL) 40 MG tablet TAKE ONE TABLET BY MOUTH EVERY DAY 90 tablet 3     fluticasone (FLONASE) 50 MCG/ACT nasal spray Spray 2 sprays into both nostrils daily as needed for rhinitis or allergies 16 g 11     minocycline (DYNACIN) 100 MG tablet TAKE 1 CAPSULE BY MOUTH DAILY 90 tablet 0       No Known Allergies     Social History     Tobacco Use     Smoking status: Some Days     Packs/day: 0.00     Years: 1.00     Pack years: 0.00     Types: Cigarettes     Smokeless tobacco: Never     Tobacco comments:     2021   Vaping Use     Vaping status: Never Used   Substance Use Topics     Alcohol use: Yes     Comment: three times per week about 2 glasses of wine     Family History   Problem  "Relation Age of Onset     Cancer Mother 55        Lung cancer     Other Cancer Mother         Lung     Depression Mother      Anxiety Disorder Mother      Hypertension Father      Hyperlipidemia Father      Rheumatoid Arthritis Sister      Breast Cancer Maternal Grandmother      Rheumatoid Arthritis Paternal Grandmother      Depression Sister      Anxiety Disorder Sister      Other Cancer Other         Lung     Mental Illness Son      Colon Cancer No family hx of      History   Drug Use No         Objective     /80   Pulse 99   Temp (!) 96.3  F (35.7  C) (Tympanic)   Resp 16   Ht 1.626 m (5' 4\")   Wt 103.9 kg (229 lb)   SpO2 99%   BMI 39.31 kg/m      Physical Exam    GENERAL APPEARANCE: healthy, alert and no distress     EYES: EOMI, PERRL     HENT: ear canals and TM's normal and nose and mouth without ulcers or lesions     NECK: no adenopathy, no asymmetry, masses, or scars and thyroid normal to palpation     RESP: lungs clear to auscultation - no rales, rhonchi or wheezes     CV: regular rates and rhythm, normal S1 S2, no S3 or S4 and no murmur, click or rub     MS: extremities normal- no gross deformities noted, no evidence of inflammation in joints, FROM in all extremities.     SKIN: no suspicious lesions or rashes     NEURO: Normal strength and tone, sensory exam grossly normal, mentation intact and speech normal     PSYCH: mentation appears normal. and affect normal/bright     LYMPHATICS: No cervical adenopathy    Recent Labs   Lab Test 11/08/22  1431 10/21/21  0930   HGB  --  13.6   PLT  --  249    142   POTASSIUM 4.1 4.4   CR 0.65 0.72        Diagnostics:  Recent Results (from the past 24 hour(s))   CBC with platelets    Collection Time: 05/30/23  1:36 PM   Result Value Ref Range    WBC Count 7.8 4.0 - 11.0 10e3/uL    RBC Count 4.24 3.80 - 5.20 10e6/uL    Hemoglobin 12.6 11.7 - 15.7 g/dL    Hematocrit 38.8 35.0 - 47.0 %    MCV 92 78 - 100 fL    MCH 29.7 26.5 - 33.0 pg    MCHC 32.5 31.5 - " 36.5 g/dL    RDW 13.1 10.0 - 15.0 %    Platelet Count 216 150 - 450 10e3/uL      No EKG required, no history of coronary heart disease, significant arrhythmia, peripheral arterial disease or other structural heart disease.    Revised Cardiac Risk Index (RCRI):  The patient has the following serious cardiovascular risks for perioperative complications:   - No serious cardiac risks = 0 points     RCRI Interpretation: 0 points: Class I (very low risk - 0.4% complication rate)           Signed Electronically by: Dominik Arizmendi DO  Copy of this evaluation report is provided to requesting physician.      Answers for HPI/ROS submitted by the patient on 5/30/2023  If you checked off any problems, how difficult have these problems made it for you to do your work, take care of things at home, or get along with other people?: Not difficult at all  PHQ9 TOTAL SCORE: 0  CARLOS 7 TOTAL SCORE: 0

## 2023-05-31 ENCOUNTER — TRANSFERRED RECORDS (OUTPATIENT)
Dept: HEALTH INFORMATION MANAGEMENT | Facility: CLINIC | Age: 53
End: 2023-05-31

## 2023-05-31 ENCOUNTER — LAB REQUISITION (OUTPATIENT)
Dept: LAB | Facility: CLINIC | Age: 53
End: 2023-05-31
Payer: COMMERCIAL

## 2023-05-31 ENCOUNTER — HOSPITAL (OUTPATIENT)
Dept: OBGYN | Facility: CLINIC | Age: 53
End: 2023-05-31

## 2023-05-31 DIAGNOSIS — N95.0 POSTMENOPAUSAL BLEEDING: ICD-10-CM

## 2023-05-31 PROCEDURE — 88305 TISSUE EXAM BY PATHOLOGIST: CPT | Mod: 26 | Performed by: PATHOLOGY

## 2023-05-31 PROCEDURE — 88305 TISSUE EXAM BY PATHOLOGIST: CPT | Mod: TC,ORL | Performed by: OBSTETRICS & GYNECOLOGY

## 2023-06-12 ENCOUNTER — MYC MEDICAL ADVICE (OUTPATIENT)
Dept: FAMILY MEDICINE | Facility: CLINIC | Age: 53
End: 2023-06-12
Payer: COMMERCIAL

## 2023-06-14 ENCOUNTER — HOSPITAL (OUTPATIENT)
Dept: OBGYN | Facility: CLINIC | Age: 53
End: 2023-06-14
Payer: COMMERCIAL

## 2023-06-14 NOTE — OP NOTE
HYSTEROSCOPY, D&C OPERATIVE NOTE    Preoperative Diagnosis: postmenopausal bleeding   Postoperative Diagnosis: normal uterine cavity  Procedure(s): EUA, Hysteroscopy, D&C  Surgeon: Brenda Acuña MD   Type of anesthesia:  general  Complications: None  EBL:  5 cc  Findings: normal uterine cavity, normal exam  Specimen(s) removed: endometrial curettings    Indications:   Postmenopausal bleeding.  Risks, benefits and alternative treatments have been discussed with the patient. Informed consent obtained.     Procedure:   The patient was taken to the operating room where general anesthesia was administered. She was prepared and draped in the normal sterile fashion in the dorsal lithotomy position.  A bivalve speculum was inserted in the vagina. A single tooth tenaculum was used to grasp the anterior lip of the cervix. The Hegar dilators were used to dilate the cervix to 6 mm. The hysteroscope was inserted and the uterine cavity explored. The above findings were noted.  Hysteroscope then removed and sharp curettage performed. Specimen sent to pathology. All instruments were then removed. Tenaculum sites appeared to be hemostatic with pressure and silver nitrate. The patient tolerated the procedure well and was transferred in stable condition to the PACU.     Brenda Acuña MD  May 31, 2023

## 2023-06-22 ENCOUNTER — OFFICE VISIT (OUTPATIENT)
Dept: OBGYN | Facility: CLINIC | Age: 53
End: 2023-06-22
Payer: COMMERCIAL

## 2023-06-22 VITALS — BODY MASS INDEX: 39.99 KG/M2 | SYSTOLIC BLOOD PRESSURE: 130 MMHG | DIASTOLIC BLOOD PRESSURE: 88 MMHG | WEIGHT: 233 LBS

## 2023-06-22 DIAGNOSIS — L73.2 HIDRADENITIS SUPPURATIVA: ICD-10-CM

## 2023-06-22 DIAGNOSIS — M54.2 NECK PAIN: ICD-10-CM

## 2023-06-22 DIAGNOSIS — E66.812 CLASS 2 OBESITY WITHOUT SERIOUS COMORBIDITY WITH BODY MASS INDEX (BMI) OF 39.0 TO 39.9 IN ADULT, UNSPECIFIED OBESITY TYPE: Primary | ICD-10-CM

## 2023-06-22 PROCEDURE — 99214 OFFICE O/P EST MOD 30 MIN: CPT | Performed by: OBSTETRICS & GYNECOLOGY

## 2023-06-22 RX ORDER — CLINDAMYCIN PHOSPHATE 11.9 MG/ML
SOLUTION TOPICAL 2 TIMES DAILY
Qty: 60 ML | Refills: 11 | Status: SHIPPED | OUTPATIENT
Start: 2023-06-22 | End: 2023-10-05

## 2023-06-22 NOTE — PROGRESS NOTES
SUBJECTIVE:                                                   Hayde Monique is a 52 year old female who presents to clinic today for the following health issue(s):  Postoperative visit    HPI:  She is status post hysteroscopy with D&C 2 weeks ago. Reports no bleeding, no fevers, chills, or other concerns related to surgery.     Pathology:  Endometrium, curettage:  --Scant fragmented weakly proliferative phase endometrium.    We discussed these results in detail.  She has had no further bleeding.    She has a couple of other concerns.  She is interested in exploring the possibility of weight loss surgery, and wonders about a referral for that.  In addition, she has developed some significant neck and upper back pain, has not yet been evaluated, and is wondering what to do next for that.    Lastly, she asked about recurrent cysts that she notices in the axilla and also underneath her breast.  When questioned further, she sometimes gets these around her waist where her waistband sits in in addition in the groin on occasion.  However, most of her issues are under the breasts and in the axillary region.      Problem list and histories reviewed & adjusted, as indicated.  Additional history: as documented.    Patient Active Problem List   Diagnosis     OCD (obsessive compulsive disorder)     Anxiety attack     Major depression     Generalized anxiety disorder     Melasma     Family history of breast cancer     Class 1 obesity due to excess calories without serious comorbidity with body mass index (BMI) of 32.0 to 32.9 in adult     Right shoulder pain     Acne, unspecified acne type     Right lateral epicondylitis     Diverticulosis of large intestine     Joint pain in both hands     Past Surgical History:   Procedure Laterality Date     ABDOMEN SURGERY           ARTHROSCOPY SHOULDER Right 2017    Procedure:  Right shoulder arthroscopy and biceps tenolysis.  Arthroscopic subacromial decompresson  (acromioplasty, bursectomy and coracoacromial ligament resection). Arthroscopic distal clavicle resection.  Surgeon:  Luca Rodríguez MD  [facility]     BREAST SURGERY      implants       SECTION       COLONOSCOPY N/A 2021    Procedure: COLONOSCOPY;  Surgeon: Kirt Benitez MD;  Location:  GI     COSMETIC SURGERY      Implants     ENT SURGERY      Tubes     ESOPHAGOSCOPY, GASTROSCOPY, DUODENOSCOPY (EGD), COMBINED N/A 2021    Procedure: ESOPHAGOGASTRODUODENOSCOPY, WITH BIOPSies;  Surgeon: Kirt Benitez MD;  Location:  GI     orif leg Right     right lower leg, rodding     ORTHOPEDIC SURGERY Right     Broken Leg - chin and screws     WRIST SURGERY Left     Left wrist, cyst excision      Social History     Tobacco Use     Smoking status: Some Days     Packs/day: 0.00     Years: 1.00     Pack years: 0.00     Types: Cigarettes     Smokeless tobacco: Never     Tobacco comments:     2021   Substance Use Topics     Alcohol use: Yes     Comment: three times per week about 2 glasses of wine      Problem (# of Occurrences) Relation (Name,Age of Onset)    Anxiety Disorder (2) Mother (Shaista Ibrahim), Sister (Aguilar Lackey)    Mental Illness (1) Son (Juan David Monique)    Cancer (1) Mother (Shaista Ibrahim, 55): Lung cancer    Depression (2) Mother (Shaista Ibrahim), Sister (Aguilar Lackey)    Hypertension (1) Father (Geovanny Lackey)    Breast Cancer (1) Maternal Grandmother (Adelina Maharaj)    Other Cancer (2) Mother (Shaista Ibrahim): Lung, Other (Hannah Oscar aunt): Lung    Hyperlipidemia (1) Father (Geovanny Lackey)    Rheumatoid Arthritis (2) Sister, Paternal Grandmother       Negative family history of: Colon Cancer            atorvastatin (LIPITOR) 40 MG tablet, Take 1 tablet (40 mg) by mouth daily  busPIRone (BUSPAR) 10 MG tablet, Take 1 tablet (10 mg) by mouth daily  fluticasone (FLONASE) 50 MCG/ACT nasal spray, Spray 2 sprays into both nostrils  daily as needed for rhinitis or allergies  minocycline (DYNACIN) 100 MG tablet, TAKE 1 CAPSULE BY MOUTH DAILY  omeprazole (PRILOSEC) 20 MG DR capsule, Take 1 capsule (20 mg) by mouth daily  PARoxetine (PAXIL) 40 MG tablet, TAKE ONE TABLET BY MOUTH EVERY DAY    No current facility-administered medications on file prior to visit.    No Known Allergies      OBJECTIVE:     /88   Wt 105.7 kg (233 lb)   BMI 39.99 kg/m     BMI: Body mass index is 39.99 kg/m .  General: Alert and oriented, no distress.  Psychiatric: Mood and affect within normal limits.  Examination of the skin in the axillary regions shows scarring consistent with hidradenitis suppurativa.    In-Clinic Test Results:  No results found for this or any previous visit (from the past 24 hour(s)).    ASSESSMENT/PLAN:                                                      Status post hysteroscopy with D&C for persistent postmenopausal bleeding.  This was a normal hysteroscopy and the curettage showed only benign atrophic tissue, so no further work-up is needed.    Hidradenitis suppurativa is a condition of the skin that often affects the axillary region, the groin, and under the breasts. It can cluster in families. Treatment can include:  ?Antibiotic liquids or gels that you put on the affected areas  ?Antibiotic pills, which you might need to take for a long time  ?Injections of steroid medicines into affected areas to bring down inflammation  Women with HS sometimes also take hormone treatments to improve their condition. This usually involves taking a birth control pill every day. Sometimes women take other types of hormone medicines, too.    To reduce the chances of flares:  ?Do not wear tight-fitting clothes  ?Try to avoid activities that cause your skin to rub against itself a lot  ?Shower every day and wash areas of HS gently with your fingers. Do not scrub affected areas with a washcloth, loofah, or brush.  ?Stop smoking, if you smoke. People who  smoke are more likely to have HS  ?Lose weight, if you are overweight. HS is more common and more severe in people who are overweight.    The plan today is to treat with clindamycin topical. Follow up with dermatology if this is not successful.  She is currently on oral minocycline, which she has been on for skin conditions in the past.  She would like to stop this.  She may need both, which we discussed, but if she wants to try to come off of the minocycline, I recommend using several weeks of topical clindamycin first and see how she does off of it.  We also discussed weight loss and dietary changes as above, and we will put information in the after visit summary for her.    Referral to weight management was placed.    Referral for physical therapy was also placed.    Brenda Acuña MD  [facility]

## 2023-06-22 NOTE — PATIENT INSTRUCTIONS
"What is hidradenitis suppurativa?     Hidradenitis suppurativa is a condition that causes red, swollen, painful bumps to form on the body, usually in places where the skin rubs together. These bumps can cause so much pain that they make it hard to move. They can smell bad or drain pus or blood. The bumps also tend to linger for weeks or months and keep coming back.  People who have hidradenitis suppurativa, also called \"HS,\" often have a hard time dealing with their problem. It can make them feel embarrassed and worried. Sometimes the condition can even cause problems in relationships, or in the workplace. If you have this problem, tell your health care provider. There are treatments that can help you.    Symptoms:   - red, swollen, painful bumps. These bumps can drain pus or blood.  The bumps usually form in places where the skin rubs together. Common locations include:  ?Armpits  ?On or under the breasts (in women)  ?In the groin area  ?Inner thighs  ?Buttocks  ?Around or near the anus  The skin problems caused by HS last a long time and get worse over time. Often the skin hardens and scars around the painful bumps. Plus, many bumps can form in a single area and sometimes form tunnels under the skin.    If you have symptoms of HS, see a doctor or nurse. He or she can look at your skin and find out if HS is the cause of your symptoms.   Treatment can include:  ?Antibiotic liquids or gels that you put on the affected areas  ?Antibiotic pills, which you might need to take for a long time  ?Injections of steroid medicines into affected areas to bring down inflammation  Women with HS sometimes also take hormone treatments to improve their condition. This usually involves taking a birth control pill every day. Sometimes women take other types of hormone medicines, too.  People with severe, long-lasting problems can have surgery that helps HS to heal. They can also get special medicines that can reduce inflammation in " "the skin.  You should know that you did not do anything to cause your condition. It is not your fault. You did not cause it by being unclean. You should also know that you cannot spread your condition to anyone else. It is not \"contagious.\"  Here are some things you can do to reduce your symptoms:  ?Do not wear tight-fitting clothes  ?Try to avoid activities that cause your skin to rub against itself a lot  ?Shower every day and wash areas of HS gently with your fingers. Do not scrub affected areas with a washcloth, loofah, or brush.  ?Stop smoking, if you smoke. People who smoke are more likely to have HS  ?Lose weight, if you are overweight. HS is more common and more severe in people who are overweight.    This topic retrieved from [employer] on:Apr 19, 2017.   "

## 2023-06-22 NOTE — NURSING NOTE
"Chief Complaint   Patient presents with     Post-op Visit     Hysteroscopy d&c with myosure -doing well       Initial /88   Wt 105.7 kg (233 lb)   BMI 39.99 kg/m   Estimated body mass index is 39.99 kg/m  as calculated from the following:    Height as of 23: 1.626 m (5' 4\").    Weight as of this encounter: 105.7 kg (233 lb).  BP completed using cuff size: large    Questioned patient about current smoking habits.  Pt. currently smokes.  Advised about smoking cessation.        "

## 2023-07-27 ASSESSMENT — SLEEP AND FATIGUE QUESTIONNAIRES
HOW LIKELY ARE YOU TO NOD OFF OR FALL ASLEEP WHILE LYING DOWN TO REST IN THE AFTERNOON WHEN CIRCUMSTANCES PERMIT: SLIGHT CHANCE OF DOZING
HOW LIKELY ARE YOU TO NOD OFF OR FALL ASLEEP WHILE SITTING AND READING: WOULD NEVER DOZE
HOW LIKELY ARE YOU TO NOD OFF OR FALL ASLEEP WHILE SITTING QUIETLY AFTER LUNCH WITHOUT ALCOHOL: WOULD NEVER DOZE
HOW LIKELY ARE YOU TO NOD OFF OR FALL ASLEEP IN A CAR, WHILE STOPPED FOR A FEW MINUTES IN TRAFFIC: WOULD NEVER DOZE
HOW LIKELY ARE YOU TO NOD OFF OR FALL ASLEEP WHEN YOU ARE A PASSENGER IN A CAR FOR AN HOUR WITHOUT A BREAK: SLIGHT CHANCE OF DOZING
HOW LIKELY ARE YOU TO NOD OFF OR FALL ASLEEP WHILE SITTING INACTIVE IN A PUBLIC PLACE: WOULD NEVER DOZE
HOW LIKELY ARE YOU TO NOD OFF OR FALL ASLEEP WHILE SITTING AND TALKING TO SOMEONE: WOULD NEVER DOZE
HOW LIKELY ARE YOU TO NOD OFF OR FALL ASLEEP WHILE WATCHING TV: SLIGHT CHANCE OF DOZING

## 2023-07-28 NOTE — PROGRESS NOTES
"New Bariatric Surgery Consultation Note    2023    RE: Hayde Monique  MR#: [id number]  : 1970      Referring provider:       2023    10:07 AM   --   Who referred you Brenda Acuña       Chief Complaint/Reason for visit: evaluation for possible weight loss surgery    Dear Dominik Arizmendi DO (General),    I had the pleasure of seeing your patient, Hayde Monique, to evaluate her obesity and consider her for possible weight loss surgery. As you know, Hayde Monique is 52 year old.  She has a height of 5' 4\", a weight of 234 lbs 12.8 oz, and calculated Body mass index is 40.3 kg/m .     Assessment & Plan   Problem List Items Addressed This Visit       Obesity, Class III, BMI 40-49.9 (morbid obesity) (H) - Primary    Relevant Orders    Adult Sleep Eval & Management  Referral    TSH with free T4 reflex    NUTRITION REFERRAL     Other Visit Diagnoses       Snoring        referred for sleep study    Relevant Orders    Adult Sleep Eval & Management  Referral    Screening for endocrine, nutritional, metabolic and immunity disorder        Relevant Orders    Comprehensive metabolic panel    Hemoglobin A1c    Vitamin D Deficiency             In summary, Hayde Monique has Class III obesity with a Body mass index is 40.3 kg/m . kg/m2 and the comorbidities stated above. She completed an informational seminar and is a possible candidate for bariatric surgery.   She will have to complete the following pre-requisites:    Watched the information videos  Talked to insurance - covered with concurrent joint pain. She did write down the requirements - doesn't recall other specifics    BARIATRIC TASK LIST  Required Weight loss:    Lose 5 lbs prior to surgery.  Goal Weight: 229 lbs    Tasks:    Have preoperative laboratory tests drawn.     Psychological Evaluation with MMPI and clearance for weight loss surgery.    Achieve clearance from dietitian to see surgeon.    In Person Bariatric Education " Visit    Double check your insurance notes and MyChart us any specific/additional pre-surgery requirements    Mental Health clearance from primary care    Dietitian clearance    Sleep clearance    Once Hayde has completed the requirements in the above tasklist and there are no further recommendations, she will see the surgeon for consultation for bariatric surgery. Hayde  verbalizes understanding of the process to surgery and the post operative schedule.  All questions were answered.     Pt to follow up in 12 wks for a visit with me or another provider. We will discuss the available weight loss surgeries including risks and benefits, get an official weight, review tasklist, and do a physical exam.     74 minutes spent on the date of the encounter doing chart review, history and exam, review test results, counseling, developing plan of care, documentation, and further activities as noted above.       HISTORY OF PRESENT ILLNESS:      7/27/2023    10:07 AM   Weight Loss History Reviewed with Patient   How long have you been overweight? Following one or more pregnancies   What is the most that you have ever weighed 340   What is the most weight you have lost? 25   I have tried the following methods to lose weight Watching portions or calories    Exercise    Weight Watchers    Atkins type diet (low carb/high protein)    Slimfast    OTC Medications    Prescription Medications   I have tried the following weight loss medications? (Check all that apply) Phentermine/Adipex-p/Suprenza       CO-MORBIDITIES OF OBESITY INCLUDE:      7/27/2023    10:07 AM   --   I have the following health issues associated with obesity High Cholesterol       PAST MEDICAL HISTORY:  Past Medical History:   Diagnosis Date    Anxiety attack     Bone and joint disord back, pelvis, leg complicat preg, childb, puerp 2020    Cancer (H) 2010    Depressive disorder 2000    Dermatofibroma 11/01/2015    Family history of breast cancer     Family history  of breast cancer     Fracture     Hearing problem 1976    LSIL (low grade squamous intraepithelial lesion) on Pap smear 10/2010    2012 pap/hpv neg    OCD (obsessive compulsive disorder)     Right lateral epicondylitis 07/10/2020     Never had cancer she reports - unsure why it was on her list    PAST SURGICAL HISTORY:  Past Surgical History:   Procedure Laterality Date    ABDOMEN SURGERY          ARTHROSCOPY SHOULDER Right 2017    Procedure:  Right shoulder arthroscopy and biceps tenolysis.  Arthroscopic subacromial decompresson (acromioplasty, bursectomy and coracoacromial ligament resection). Arthroscopic distal clavicle resection.  Surgeon:  Luca Rodríguez MD  Location: [facility]    BREAST SURGERY      implants      SECTION      COLONOSCOPY N/A 2021    Procedure: COLONOSCOPY;  Surgeon: Kirt Benitez MD;  Location:  GI    COSMETIC SURGERY      Implants    ENT SURGERY      Tubes    ESOPHAGOSCOPY, GASTROSCOPY, DUODENOSCOPY (EGD), COMBINED N/A 2021    Procedure: ESOPHAGOGASTRODUODENOSCOPY, WITH BIOPSies;  Surgeon: Kirt Benitez MD;  Location:  GI    orif leg Right     right lower leg, rodding    ORTHOPEDIC SURGERY Right     Broken Leg - chin and screws    WRIST SURGERY Left     Left wrist, cyst excision     No personal or family history of blood clots or anesthesia concerns      FAMILY HISTORY:   Family History   Problem Relation Age of Onset    Cancer Mother 55        Lung cancer    Other Cancer Mother         Lung    Depression Mother     Anxiety Disorder Mother     Hypertension Father     Hyperlipidemia Father     Rheumatoid Arthritis Sister     Other Cancer Sister         Lung    Breast Cancer Maternal Grandmother     Rheumatoid Arthritis Paternal Grandmother     Depression Sister     Anxiety Disorder Sister     Other Cancer Other         Lung    Mental Illness Son     Colon Cancer No family hx of        SOCIAL  HISTORY:       7/27/2023    10:07 AM   Social History Questions Reviewed With Patient   Which best describes your employment status (select all that apply) I work full-time   If you work, 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 , sister, kids, dad, aunt   Who can you count on for support throughout your weight loss surgery journey? All family       HABITS:      7/27/2023    10:07 AM   --   How often do you drink alcohol? 2-3 times a week   If you do drink alcohol, how many drinks might you have in a day? (one drink = 5 oz. wine, 1 can/bottle of beer, 1 shot liquor) 1 or 2   Do you currently use any of the following Nicotine products? No   Have you ever used any of the following nicotine products? Cigarettes   If you previously used any of these products, what year did you quit? 2021   Have you or are you currently using street drugs or prescription strength medication for which you do not have a prescription for? No   Do you have a history of chemical dependency (alcohol or drug abuse)? No     Discussed alcohol - no previous dependency/abuse diagnosis  Caffeine - currently daily coffee will stop drinking  Nsaids - occasionally currently and will switch to tylneol    PSYCHOLOGICAL HISTORY:       7/27/2023    10:07 AM   Psychological History Reviewed With Patient   Have you ever attempted suicide? In the last 1 to 5 years.   Have you had thoughts of suicide in the past year? No   Have you ever been hospitalized for mental illness or a suicide attempt? In the last 5 to 10 years.   Do you have a history of chronic pain? Yes   Have you ever been diagnosed with fibromyalgia? No   Are you currently being treated for any of the following? (select all that apply) Depression    Anxiety   Are you currently seeing a therapist or counselor? No   Are you  currently seeing a psychiatrist? No   Suicide ideation visit from 11/3/2019 ED note - ideation but no attempt. One other previous overdose closer to 10 year ago. Doesn't believe she saw anyone outpatient after the hospitaltion. Adeline may know more about it. Prior attempt with overdose on sleeping pills in the past    Currently seeing Kamilla Bahena NP for medication management of depression.        7/27/2023    10:07 AM   Questions Reviewed With Patient   How ready are you to make changes regarding your weight? Number 1 = Not ready at all to make changes up to 10 = very ready. 10   How confident are you that you can change? 1 = Not confident that you will be successful making changes up to 10 = very confident. 10       ROS:      7/27/2023    10:07 AM   --   Skin None of the above   HEENT None of these   Musculoskeletal Joint Pain    Back pain    Swelling of legs, no lymphedema/DVT/clotting   Arthritis   Cardiovascular None of the above   Pulmonary Shortness of breath with activity - patient suspected weight related- no heart/lung proglems. No cardiologist/pulmonologist. No chest pain or exertional symptoms    Snoring   Gastrointestinal Heartburn    Reflux   Genitourinary Stress incontinence (losing urine when coughing, sneezing, etc.)   Hematological None of the above   Neurological None of the above   Female only Post-menopausal     Teeth - no missing teeth  Snoring - patricio referral placed  Birth control - fully post-menopausal  GERD - not too bad with omeprazole    EATING BEHAVIORS:      7/27/2023    10:07 AM   --   Have you or anyone else thought that you had an eating disorder? No   Do you currently binge eat (eat a large amount of food in a short time)? No   Are you an emotional eater? No   Do you get up to eat after falling asleep? No   Can you afford 3 meals a day? Yes   Can you afford 50-60 dollars a month for vitamins? Yes     EXERCISE:      7/27/2023    10:07 AM   --   How often do you exercise? 3 to 4  times per week   What is the duration of your exercise (in minutes)? 45 Minutes   What types of exercise do you do? walking   What keeps you from being more active? Pain     MEDICATIONS:  Current Outpatient Medications   Medication    atorvastatin (LIPITOR) 40 MG tablet    busPIRone (BUSPAR) 10 MG tablet    clindamycin (CLEOCIN T) 1 % external solution    fluticasone (FLONASE) 50 MCG/ACT nasal spray    minocycline (DYNACIN) 100 MG tablet    omeprazole (PRILOSEC) 20 MG DR capsule    PARoxetine (PAXIL) 40 MG tablet     No current facility-administered medications for this visit.        ALLERGIES:  No Known Allergies    LABS AND RECORDS REVIEWED:  Vitamin D Deficiency screening   Date Value Ref Range Status   06/23/2020 47 20 - 75 ug/L Final     Comment:     Season, race, dietary intake, and treatment affect the concentration of   25-hydroxy-Vitamin D. Values may decrease during winter months and increase   during summer months. Values 20-29 ug/L may indicate Vitamin D insufficiency   and values <20 ug/L may indicate Vitamin D deficiency.  Vitamin D determination is routinely performed by an immunoassay specific for   25 hydroxyvitamin D3.  If an individual is on vitamin D2 (ergocalciferol)   supplementation, please specify 25 OH vitamin D2 and D3 level determination by   LCMSMS test [id number].       TSH   Date Value Ref Range Status   06/23/2020 3.04 0.40 - 4.00 mU/L Final     Sodium   Date Value Ref Range Status   11/08/2022 140 133 - 144 mmol/L Final   06/23/2020 137 133 - 144 mmol/L Final     Potassium   Date Value Ref Range Status   11/08/2022 4.1 3.4 - 5.3 mmol/L Final     Comment:     Specimen slightly hemolyzed, potassium may be falsely elevated.   06/23/2020 3.9 3.4 - 5.3 mmol/L Final     Chloride   Date Value Ref Range Status   11/08/2022 105 94 - 109 mmol/L Final   06/23/2020 105 94 - 109 mmol/L Final     Carbon Dioxide   Date Value Ref Range Status   06/23/2020 25 20 - 32 mmol/L Final     Carbon Dioxide (CO2)    Date Value Ref Range Status   11/08/2022 26 20 - 32 mmol/L Final     Anion Gap   Date Value Ref Range Status   11/08/2022 9 3 - 14 mmol/L Final   06/23/2020 7 3 - 14 mmol/L Final     Glucose   Date Value Ref Range Status   11/08/2022 89 70 - 99 mg/dL Final   06/23/2020 87 70 - 99 mg/dL Final     Comment:     Fasting specimen     Urea Nitrogen   Date Value Ref Range Status   11/08/2022 14 7 - 30 mg/dL Final   06/23/2020 13 7 - 30 mg/dL Final     Creatinine   Date Value Ref Range Status   11/08/2022 0.65 0.52 - 1.04 mg/dL Final   06/23/2020 0.62 0.52 - 1.04 mg/dL Final     GFR Estimate   Date Value Ref Range Status   11/08/2022 >90 >60 mL/min/1.73m2 Final     Comment:     Effective December 21, 2021 eGFRcr in adults is calculated using the 2021 CKD-EPI creatinine equation which includes age and gender (Chloé et al., [place], DOI: 10.1056/OTPLei7638506)   06/23/2020 >90 >60 mL/min/[1.73_m2] Final     Comment:     Non  GFR Calc  Starting 12/18/2018, serum creatinine based estimated GFR (eGFR) will be   calculated using the Chronic Kidney Disease Epidemiology Collaboration   (CKD-EPI) equation.       Calcium   Date Value Ref Range Status   11/08/2022 9.3 8.5 - 10.1 mg/dL Final   06/23/2020 9.1 8.5 - 10.1 mg/dL Final     Bilirubin Total   Date Value Ref Range Status   11/08/2022 0.9 0.2 - 1.3 mg/dL Final   06/23/2020 0.5 0.2 - 1.3 mg/dL Final     Alkaline Phosphatase   Date Value Ref Range Status   11/08/2022 94 40 - 150 U/L Final   06/23/2020 87 40 - 150 U/L Final     ALT   Date Value Ref Range Status   11/08/2022 74 (H) 0 - 50 U/L Final   06/23/2020 50 0 - 50 U/L Final     AST   Date Value Ref Range Status   11/08/2022 41 0 - 45 U/L Final     Comment:     Specimen is hemolyzed which can falsely elevate AST. Analysis of a non-hemolyzed specimen may result in a lower value.   06/23/2020 21 0 - 45 U/L Final     Cholesterol   Date Value Ref Range Status   11/08/2022 231 (H) <200 mg/dL Final   03/01/2019 316  "(H) <200 mg/dL Final     Comment:     Desirable:       <200 mg/dl     HDL Cholesterol   Date Value Ref Range Status   03/01/2019 54 >49 mg/dL Final     Direct Measure HDL   Date Value Ref Range Status   11/08/2022 52 >=50 mg/dL Final     LDL Cholesterol Calculated   Date Value Ref Range Status   11/08/2022 147 (H) <=100 mg/dL Final   03/01/2019 221 (H) <100 mg/dL Final     Comment:     Above desirable:  100-129 mg/dl  Borderline High:  130-159 mg/dL  High:             160-189 mg/dL  Very high:       >189 mg/dl       Triglycerides   Date Value Ref Range Status   11/08/2022 161 (H) <150 mg/dL Final   03/01/2019 207 (H) <150 mg/dL Final     Comment:     Borderline high:  150-199 mg/dl  High:             200-499 mg/dl  Very high:       >499 mg/dl       WBC   Date Value Ref Range Status   06/23/2020 7.0 4.0 - 11.0 10e9/L Final     WBC Count   Date Value Ref Range Status   05/30/2023 7.8 4.0 - 11.0 10e3/uL Final     Hemoglobin   Date Value Ref Range Status   05/30/2023 12.6 11.7 - 15.7 g/dL Final   06/23/2020 12.5 11.7 - 15.7 g/dL Final     Hematocrit   Date Value Ref Range Status   05/30/2023 38.8 35.0 - 47.0 % Final   06/23/2020 39.3 35.0 - 47.0 % Final     MCV   Date Value Ref Range Status   05/30/2023 92 78 - 100 fL Final   06/23/2020 95 78 - 100 fl Final     Platelet Count   Date Value Ref Range Status   05/30/2023 216 150 - 450 10e3/uL Final   06/23/2020 239 150 - 450 10e9/L Final         PHYSICAL EXAM:  /82   Pulse 83   Resp 20   Ht 5' 4\" (1.626 m)   Wt 234 lb 12.8 oz (106.5 kg)   SpO2 96%   BMI 40.30 kg/m    GENERAL: Healthy, alert and no distress  EYES: Eyes grossly normal to inspection.    RESP: No audible wheeze, cough, or visible cyanosis.  No visible retractions or increased work of breathing.  Lungs clear to auscultation bilaterally  Oral: No missing molars. Scant fillings noted  Neck: No obvious swellings  Heart: regular rate and rhythm. No significant murmurs, rubs, or gallops  Abdomen: non " tender. No noted organomegaly. Bowel sounds in tact.  SKIN: Visible skin clear. No significant rash, abnormal pigmentation or lesions.  NEURO: Mentation and speech appropriate for age.  PSYCH: Mentation appears normal, affect normal/bright, judgement and insight intact, normal speech and appearance well-groomed.      Sincerely,     Kate Gupta PA-C

## 2023-08-03 ENCOUNTER — OFFICE VISIT (OUTPATIENT)
Dept: SURGERY | Facility: CLINIC | Age: 53
End: 2023-08-03
Payer: COMMERCIAL

## 2023-08-03 VITALS
HEIGHT: 64 IN | BODY MASS INDEX: 40.08 KG/M2 | RESPIRATION RATE: 20 BRPM | DIASTOLIC BLOOD PRESSURE: 82 MMHG | SYSTOLIC BLOOD PRESSURE: 138 MMHG | WEIGHT: 234.8 LBS | HEART RATE: 83 BPM | OXYGEN SATURATION: 96 %

## 2023-08-03 DIAGNOSIS — Z13.228 SCREENING FOR ENDOCRINE, NUTRITIONAL, METABOLIC AND IMMUNITY DISORDER: ICD-10-CM

## 2023-08-03 DIAGNOSIS — E66.01 OBESITY, CLASS III, BMI 40-49.9 (MORBID OBESITY) (H): Primary | ICD-10-CM

## 2023-08-03 DIAGNOSIS — Z13.29 SCREENING FOR ENDOCRINE, NUTRITIONAL, METABOLIC AND IMMUNITY DISORDER: ICD-10-CM

## 2023-08-03 DIAGNOSIS — Z13.0 SCREENING FOR ENDOCRINE, NUTRITIONAL, METABOLIC AND IMMUNITY DISORDER: ICD-10-CM

## 2023-08-03 DIAGNOSIS — R06.83 SNORING: ICD-10-CM

## 2023-08-03 DIAGNOSIS — Z13.21 SCREENING FOR ENDOCRINE, NUTRITIONAL, METABOLIC AND IMMUNITY DISORDER: ICD-10-CM

## 2023-08-03 PROBLEM — E66.813 OBESITY, CLASS III, BMI 40-49.9 (MORBID OBESITY) (H): Status: ACTIVE | Noted: 2023-08-03

## 2023-08-03 PROCEDURE — 99205 OFFICE O/P NEW HI 60 MIN: CPT | Performed by: PHYSICIAN ASSISTANT

## 2023-08-03 NOTE — LETTER
August 3, 2023      Hayde Monique  [address] [place] [address]          Bariatric Letter of Support from Mental Health Provider    Dear Mental Health Provider:    Hayde is seeking bariatric surgery.  Although she will undergo a separate bariatric psychological evaluation, it is important to us that mental health providers of our surgical patients are involved at all stages of the process.     In particular for Hayde, chart review noted suicidal ideation/ER visit 11/3/2019 as well as suicidal attempt/tylenol overdose hospitalization 4/6/2011. She informs me that she does not have a dedicated therapist/psychiatrist but rather obtains her mental health care through primary care. She gave me both your names as primary care providers that would be familiar with her mental health. It is important that we have the insight and support of her medical providers that are familiar with her mental health to provide their input in this process.    An additional consideration is that Paxil is known to be weight promoting and I also was wondering if you and Hayde would be open to alternative medications for her depression and OCD that is less weight promoting if indicated.    Please write a letter of support including the following information:    Is the patient under your care?  If so, for how long?  Comment on the relevant diagnosis and any current related prescribed medications.  From a mental health standpoint do you feel patient is stable?  It is important that the patient have regular follow up initially and for the first year following surgery.  Are you able to see the patient for this?    Sincerely,      Kate Gupta PA-C   [facility]     Please fax letter to [phone] or route in Epic to Kate Gupta PA-C

## 2023-08-03 NOTE — PATIENT INSTRUCTIONS
Nice to meet you today.  Below is our plan we discussed.-  Kate Gupta PA-C   Bariatric Task List  Labs have been ordered. Call [phone] for an appt.    Psychological evaluation was ordered.  Call [phone] to schedule.   Lose 5 lbs. Goal weight 229.  Double check your insurance notes and MyChart us any specific/additional pre-surgery requirements  Mental Health clearance from primary care  Dietitian clearance  Sleep clearance  FOLLOW-UP:  Call [phone]  -3 mo pre-surgery visit IN CLINIC with Kate Gupta PA-C   -1 mo nutrition visit  _________________________________________________  In general before being submitted for insurance approval, you will need the following:  -Clearance by the Psychologist  -Clearance by the dietitian  -Surgeon consult  -Use CPAP for at least one month before surgery if you have sleep apnea. Bring to hospital day of surgery.  -Tobacco free for 3 months before surgery and remain a non-smoker thereafter.   -If you have diabetes, A1C less than 8 before surgery  ____________________________________________________________________  After Bariatric Surgery:   Vitamin supplementation is a lifelong need. You will take:  B-12 daily or by injection monthly  Vitamin D3 5000 IU daily   2 Multivitamins containing 18mg of iron  Calcium citrate 2 daily  Thiamine 100 mg weekly   Vitron C once daily if you are a menstruating female  Follow up is impotent to keep your weight off and your vitamin levels up.  Your labs will be monitored every 3 months for the first year and yearly thereafter.  Prevent Ulcers by avoiding tobacco and anti-inflammatories (NSAIDS) forever.  Also alcohol and caffeine in excess. NSAIDS examples: Aspirin, Ibuprofen, Naproxen) Tylenol is fine.    Protect you stomach if on Asprin.  If on Aspirin to protect your heart or for another reason, make sure to take a PPI like (omeprazole, protonix, nexium, prevacid) to protect your stomach.    Alcohol affects you differently.  There is a 10% increase of Alcohol Use Disorder in patients with bariatric surgery.   If you have even ONE drink DO NOT DRIVE.

## 2023-08-03 NOTE — LETTER
August 3, 2023      Hayde Monique  [address] [place] [address]      Bariatric Task List        Required Weight loss:    Lose 5 lbs prior to surgery.  Goal Weight: 229 lbs    Tasks:    Have preoperative laboratory tests drawn.     Psychological Evaluation with MMPI and clearance for weight loss surgery.    Achieve clearance from dietitian to see surgeon.    In Person Bariatric Education Visit    Double check your insurance notes and MyChart us any specific/additional pre-surgery requirements    Mental Health clearance from primary care    Dietitian clearance    Sleep clearance

## 2023-08-04 ENCOUNTER — VIRTUAL VISIT (OUTPATIENT)
Dept: SURGERY | Facility: CLINIC | Age: 53
End: 2023-08-04
Payer: COMMERCIAL

## 2023-08-04 VITALS — HEIGHT: 64 IN | WEIGHT: 234.8 LBS | BODY MASS INDEX: 40.08 KG/M2

## 2023-08-04 DIAGNOSIS — E66.01 OBESITY, CLASS III, BMI 40-49.9 (MORBID OBESITY) (H): Primary | ICD-10-CM

## 2023-08-04 PROCEDURE — 97802 MEDICAL NUTRITION INDIV IN: CPT | Mod: VID

## 2023-08-04 NOTE — PATIENT INSTRUCTIONS
Andres Lock!    It was great chatting with you today! Here are some links to the information we discussed:         Vitamins and Minerals  [email or web address]     Diet Guidelines:  [email or web address]     Your Stage 1 Diet: Clear Liquids  [email or web address]     Your Stage 2 Diet: Low-fat Full Liquids  [email or web address]     Your Stage 3 Diet: Pureed Foods  [email or web address]     Your Stage 4 Diet: Soft Foods  [email or web address]    Your Stage 5 Diet: Regular Foods  [email or web address]       Here are the goals we discussed for this first month:    1. Begin taking a daily multivitamin , calcium, and vitamin D supplement   - The doses/requirements for these are in the vitamin/mineral handout link above.   - The easiest schedule will be to take calcium 2-3x/ per day, and take everything else at bedtime.      2. Reduce caffeine intake  - Go slowly/step-wise with this. For example, try reducing your coffee intake by one cup each week.     3. Eat balanced meals at regular intervals  - Have your first meal within 1 hour of waking up, then eat a meal every 4-6 houres             Your next visit can be scheduled via the call center at  and should be in one month. In the interim, please contact your insurance and inquire as to whether they have a specific time frame or number of dietitian visits needed. Have a great week and let us know if any questions/concerns pop up!     Mirella Santana, PATRZIIA, LD  ?Clinical Dietitian

## 2023-08-04 NOTE — PROGRESS NOTES
"Hayde is a 52 year old who is being evaluated via a billable video visit.      How would you like to obtain your AVS? MyChart  If the video visit is dropped, the invitation should be resent by: Text to cell phone: [phone]  Will anyone else be joining your video visit? No      Video-Visit Details    Type of service:  Video Visit     Originating Location (pt. Location): Home    Distant Location (provider location):  Off-site  Platform used for Video Visit: [facility]      New Bariatric Nutrition Consultation Note    Reason For Visit: Nutrition Assessment    Hayde Monique is a 52 year old presenting today for new bariatric nutrition consult.  Pt is interested in laparoscopic (undecided).  Patient is accompanied by self.        7/27/2023    10:07 AM   Support System Reviewed With Patient   Who do you have in your support network that can be available to help you for the first 2 weeks after surgery? , sister, kids, dad, aunt   Who can you count on for support throughout your weight loss surgery journey? All family       ANTHROPOMETRICS:  Estimated body mass index is 40.3 kg/m  as calculated from the following:    Height as of 8/3/23: 5' 4\" (1.626 m).    Weight as of 8/3/23: 234 lb 12.8 oz (106.5 kg).    Required weight loss goal pre-op: 5 lbs from initial consult weight (goal weight 229 lbs or less before surgery)        7/27/2023    10:07 AM   --   I have tried the following methods to lose weight Watching portions or calories    Exercise    Weight Watchers    Atkins type diet (low carb/high protein)    Slimfast    OTC Medications    Prescription Medications           7/27/2023    10:07 AM   Weight Loss Questions Reviewed With Patient   How long have you been overweight? Following one or more pregnancies       SUPPLEMENT INFORMATION:  Statin noted    NUTRITION HISTORY:      7/27/2023    10:07 AM   Recall Diet Questions Reviewed With Patient   Describe what you typically consume for breakfast (typical or most " recent) Eggs with veggies   Describe what you typically consume for lunch (typical or most recent) Lettuce wraps, soup, salad   Describe what you typically consume for supper (typical or most recent) soup, pizza, chicken, steak   Describe what you typically consume as snacks (typical or most recent) Nuts, chips, fruit, veggies   How many ounces of water, or other low calorie drinks, do you drink daily (8 oz=1 glass)? 48 oz   How many ounces of caffeine (coffee, tea, pop) do you drink daily (8 oz=1 glass)? 24 oz   How many ounces of carbonated (pop, beer, sparkling water) drinks do you drinky daily (8 oz=1 glass)? 8 oz   How many ounces of juice, pop, sweet tea, sports drinks, protein drinks, other sweetened drinks, do you drink daily (8 oz=1 glass)? 8 oz   How many ounces of milk do you drink daily (8 oz=1 glass) 0 oz   How often do you drink alcohol? 2-3 times a week   If you do drink alcohol, how many drinks might you have in a day? (one drink = 5 oz. wine, 1 can/bottle of beer, 1 shot liquor) 1 or 2           7/27/2023    10:07 AM   Eating Habits   What foods do you crave? Chips       ADDITIONAL INFORMATION:  Pt has been considering surgery for about 4 years and knows several people who have had surgery. Of note, she would like to commit to 3 months of the pre-op process, but admits that if she is able to lose a meaningful amount of weight in this time period, she will likely pivot to MWL program.         7/27/2023    10:07 AM   Dining Out History Reviewed With Patient   How often do you dine out? Around once a week.   Where do you dine out? (select all that apply) sit-down restaurants   What types of food do you order when you dine out? Salad, chicken           7/27/2023    10:07 AM   Physical Activity Reviewed With Patient   How often do you exercise? 3 to 4 times per week   What is the duration of your exercise (in minutes)? 45 Minutes   What types of exercise do you do? walking   What keeps you from being  more active? Pain       NUTRITION DIAGNOSIS:  Obesity r/t long history of self-monitoring deficit and excessive energy intake aeb BMI >30 kg/m2.    INTERVENTION:  Intervention Provided/Education Provided on post-op diet guidelines, vitamins/minerals essential post-operatively, GI anatomy of bariatric surgeries, ways to help prepare for post-op diet guidelines pre-operatively, portion/calorie-control, mindful eating.  Provided pt with list of goals RD contact information.          7/27/2023    10:07 AM   Questions Reviewed With Patient   How ready are you to make changes regarding your weight? Number 1 = Not ready at all to make changes up to 10 = very ready. 10   How confident are you that you can change? 1 = Not confident that you will be successful making changes up to 10 = very confident. 10       Patient Understanding: good  Expected Compliance: good    GOALS:  Begin taking multivitamin, calcium and vitamin D per guidelines  Slowly reduce caffeine intake  Eat first meal within 1h of waking    Follow-Up:   Recommend 2-3 follow up visits to assist with lifestyle changes or per insurance.  **Pt to verify insurance requirements    Time spent with patient: 50 minutes      Mirella Santana RD, LD  Clinical Dietitian

## 2023-09-18 ENCOUNTER — TELEPHONE (OUTPATIENT)
Dept: SURGERY | Facility: CLINIC | Age: 53
End: 2023-09-18
Payer: COMMERCIAL

## 2023-09-18 ENCOUNTER — MYC MEDICAL ADVICE (OUTPATIENT)
Dept: FAMILY MEDICINE | Facility: CLINIC | Age: 53
End: 2023-09-18
Payer: COMMERCIAL

## 2023-09-18 NOTE — TELEPHONE ENCOUNTER
Pt called stating had couple of questions re: Task List for bariatric surgery.  Can see that pt already sent  msg re: need for sleep study.  This was forwarded to provider.    Returned pt's call.  Informed pt that following Tasks still are open on Task List  Labs have to be approved before sees surgeon  Labs are in the [facility] system and can be done at any [facility] clinic just call ahead to schedule. Stated will get done.  2. Sent message to provider re: sleep. Provider will get back to pt via  msg.  Provider will have to decide if sleep eval needed.   3. PCP letter. Pt has letter and will send to PCP  4. Psych eval - pt will call Dr. Aburto.  5. Needs diet clear - will schedule another visit.  6. Will send invite of support group for Wednesday 9/20/23.  Verified email address [email or web address]      Pt okay to have surgery next year.  To call with further questions.  Blanca Schmidt, MS, RD, RN

## 2023-09-20 NOTE — TELEPHONE ENCOUNTER
5/30/2023    Please see my chart message below     Please review and advise     Thank you     Sofia oMrrison RN, BSN  [facility]

## 2023-09-25 ENCOUNTER — TELEPHONE (OUTPATIENT)
Dept: FAMILY MEDICINE | Facility: CLINIC | Age: 53
End: 2023-09-25
Payer: COMMERCIAL

## 2023-09-25 NOTE — TELEPHONE ENCOUNTER
Patient called     Requesting an appointment for a letter to be written for weight loose surgery-advised to schedule to talk about it.     Scheduled- advised of location ,arrival and apt time.

## 2023-09-25 NOTE — TELEPHONE ENCOUNTER
Scheduled apt .     Future Appointments 9/25/2023 - 3/23/2024        Date Visit Type Length Department Provider     9/26/2023  2:15 PM MYCHART LAB VISIT 15 min RV LABORATORY RV LAB    Location Instructions:     The clinic is located at [address]  in [place].&nbsp; We offer free parking in our on-site lot.              9/26/2023  3:00 PM MYC 3D SCREENING MAMMOGRAM 15 min RV MAMMOGRAPHY [id number]    Location Instructions:     [facility] [address] [place] [address] [facility] [address]  [phone]               10/5/2023  3:00 PM OFFICE VISIT 30 min RV FAMILY PRACTICE Dominik Arizmendi DO    Location Instructions:     [facility] is located at [address], [address] Free parking is available; access the lot by turning north from [address]  onto [address], then west onto [address].              10/31/2023  9:30 AM OFFICE VISIT 30 min RV FAMILY PRACTICE Kamilla Taveras, APRN CNP    Location Instructions:     [facility] is located at [address], [address]. Free parking is available; access the lot by turning north from [address]  onto [address], then west onto [address].              11/6/2023  9:00 AM RETURN BARIATRIC 30 min SH SURGICAL WGHT LOSS Kate Gupta PA-C    Location Instructions:     Located at [address] on the west side of [facility].&nbsp; It is best to park in the Skyway Ramp on the NW corner of [address]nbsp;  Walk across the skyway that goes over [address] into the hospital.&nbsp; When entering into the hospital, go to the Left, past the stairway, and take the elevator to the 4th floor, [address].&nbsp;               12/19/2023  2:30 PM NEW SLEEP 60 min  SLEEP CENTER Star Bernstein PA-C    Location Instructions:     From [address]: Exit at [email or web address] on south side of  [place]. Turn right on [address]. Turn left at stoplight on [address]. [facility] will be in view two blocks ahead. Park in the blue lot and enter the building through the Main Entrance. Complete Registration and check in process at the  located on the first floor lobby. Then proceed to the second floor by using the stairs or elevator to the sleep center.

## 2023-09-26 ENCOUNTER — ANCILLARY PROCEDURE (OUTPATIENT)
Dept: MAMMOGRAPHY | Facility: CLINIC | Age: 53
End: 2023-09-26
Attending: FAMILY MEDICINE
Payer: COMMERCIAL

## 2023-09-26 ENCOUNTER — ANCILLARY ORDERS (OUTPATIENT)
Dept: MAMMOGRAPHY | Facility: CLINIC | Age: 53
End: 2023-09-26

## 2023-09-26 ENCOUNTER — ALLIED HEALTH/NURSE VISIT (OUTPATIENT)
Dept: FAMILY MEDICINE | Facility: CLINIC | Age: 53
End: 2023-09-26
Payer: COMMERCIAL

## 2023-09-26 ENCOUNTER — LAB (OUTPATIENT)
Dept: LAB | Facility: CLINIC | Age: 53
End: 2023-09-26
Payer: COMMERCIAL

## 2023-09-26 DIAGNOSIS — Z13.29 SCREENING FOR ENDOCRINE, NUTRITIONAL, METABOLIC AND IMMUNITY DISORDER: ICD-10-CM

## 2023-09-26 DIAGNOSIS — Z13.0 SCREENING FOR ENDOCRINE, NUTRITIONAL, METABOLIC AND IMMUNITY DISORDER: ICD-10-CM

## 2023-09-26 DIAGNOSIS — Z13.228 SCREENING FOR ENDOCRINE, NUTRITIONAL, METABOLIC AND IMMUNITY DISORDER: ICD-10-CM

## 2023-09-26 DIAGNOSIS — E66.01 OBESITY, CLASS III, BMI 40-49.9 (MORBID OBESITY) (H): ICD-10-CM

## 2023-09-26 DIAGNOSIS — Z23 NEED FOR PROPHYLACTIC VACCINATION AND INOCULATION AGAINST INFLUENZA: Primary | ICD-10-CM

## 2023-09-26 DIAGNOSIS — Z12.31 VISIT FOR SCREENING MAMMOGRAM: ICD-10-CM

## 2023-09-26 DIAGNOSIS — Z13.21 SCREENING FOR ENDOCRINE, NUTRITIONAL, METABOLIC AND IMMUNITY DISORDER: ICD-10-CM

## 2023-09-26 LAB
ALBUMIN SERPL BCG-MCNC: 4.3 G/DL (ref 3.5–5.2)
ALP SERPL-CCNC: 86 U/L (ref 35–104)
ALT SERPL W P-5'-P-CCNC: 59 U/L (ref 0–50)
ANION GAP SERPL CALCULATED.3IONS-SCNC: 14 MMOL/L (ref 7–15)
AST SERPL W P-5'-P-CCNC: 39 U/L (ref 0–45)
BILIRUB SERPL-MCNC: 0.3 MG/DL
BUN SERPL-MCNC: 10 MG/DL (ref 6–20)
CALCIUM SERPL-MCNC: 8.9 MG/DL (ref 8.6–10)
CHLORIDE SERPL-SCNC: 107 MMOL/L (ref 98–107)
CREAT SERPL-MCNC: 0.56 MG/DL (ref 0.51–0.95)
DEPRECATED HCO3 PLAS-SCNC: 23 MMOL/L (ref 22–29)
EGFRCR SERPLBLD CKD-EPI 2021: >90 ML/MIN/1.73M2
GLUCOSE SERPL-MCNC: 92 MG/DL (ref 70–99)
HBA1C MFR BLD: 5.2 % (ref 0–5.6)
POTASSIUM SERPL-SCNC: 4.3 MMOL/L (ref 3.4–5.3)
PROT SERPL-MCNC: 6.6 G/DL (ref 6.4–8.3)
SODIUM SERPL-SCNC: 144 MMOL/L (ref 135–145)
TSH SERPL DL<=0.005 MIU/L-ACNC: 2.65 UIU/ML (ref 0.3–4.2)
VIT D+METAB SERPL-MCNC: 21 NG/ML (ref 20–50)

## 2023-09-26 PROCEDURE — 77067 SCR MAMMO BI INCL CAD: CPT | Mod: TC | Performed by: RADIOLOGY

## 2023-09-26 PROCEDURE — 80053 COMPREHEN METABOLIC PANEL: CPT

## 2023-09-26 PROCEDURE — 90471 IMMUNIZATION ADMIN: CPT

## 2023-09-26 PROCEDURE — 90682 RIV4 VACC RECOMBINANT DNA IM: CPT

## 2023-09-26 PROCEDURE — 83036 HEMOGLOBIN GLYCOSYLATED A1C: CPT

## 2023-09-26 PROCEDURE — 84443 ASSAY THYROID STIM HORMONE: CPT

## 2023-09-26 PROCEDURE — 77063 BREAST TOMOSYNTHESIS BI: CPT | Mod: TC | Performed by: RADIOLOGY

## 2023-09-26 PROCEDURE — 82306 VITAMIN D 25 HYDROXY: CPT

## 2023-09-26 PROCEDURE — 99207 PR NO CHARGE NURSE ONLY: CPT

## 2023-09-26 PROCEDURE — 36415 COLL VENOUS BLD VENIPUNCTURE: CPT

## 2023-10-05 ENCOUNTER — OFFICE VISIT (OUTPATIENT)
Dept: FAMILY MEDICINE | Facility: CLINIC | Age: 53
End: 2023-10-05
Payer: COMMERCIAL

## 2023-10-05 VITALS
WEIGHT: 232 LBS | SYSTOLIC BLOOD PRESSURE: 118 MMHG | RESPIRATION RATE: 12 BRPM | DIASTOLIC BLOOD PRESSURE: 78 MMHG | OXYGEN SATURATION: 100 % | BODY MASS INDEX: 39.61 KG/M2 | HEIGHT: 64 IN | TEMPERATURE: 98.2 F | HEART RATE: 75 BPM

## 2023-10-05 DIAGNOSIS — E78.5 HYPERLIPIDEMIA WITH TARGET LDL LESS THAN 100: ICD-10-CM

## 2023-10-05 DIAGNOSIS — F33.2 MAJOR DEPRESSIVE DISORDER, RECURRENT, SEVERE WITHOUT PSYCHOTIC FEATURES (H): ICD-10-CM

## 2023-10-05 DIAGNOSIS — F41.1 GENERALIZED ANXIETY DISORDER: ICD-10-CM

## 2023-10-05 DIAGNOSIS — R10.13 DYSPEPSIA: ICD-10-CM

## 2023-10-05 DIAGNOSIS — E66.01 OBESITY, CLASS III, BMI 40-49.9 (MORBID OBESITY) (H): Primary | ICD-10-CM

## 2023-10-05 PROCEDURE — 99213 OFFICE O/P EST LOW 20 MIN: CPT | Performed by: FAMILY MEDICINE

## 2023-10-05 RX ORDER — BUSPIRONE HYDROCHLORIDE 10 MG/1
10 TABLET ORAL DAILY
Qty: 90 TABLET | Refills: 3 | Status: SHIPPED | OUTPATIENT
Start: 2023-10-05 | End: 2023-12-15

## 2023-10-05 RX ORDER — ATORVASTATIN CALCIUM 40 MG/1
40 TABLET, FILM COATED ORAL DAILY
Qty: 90 TABLET | Refills: 3 | Status: SHIPPED | OUTPATIENT
Start: 2023-10-05 | End: 2023-12-15

## 2023-10-05 RX ORDER — PAROXETINE 40 MG/1
TABLET, FILM COATED ORAL
Qty: 90 TABLET | Refills: 3 | Status: SHIPPED | OUTPATIENT
Start: 2023-10-05 | End: 2023-12-15

## 2023-10-05 ASSESSMENT — PAIN SCALES - GENERAL: PAINLEVEL: NO PAIN (0)

## 2023-10-05 NOTE — PROGRESS NOTES
"  Assessment & Plan     Obesity, Class III, BMI 40-49.9 (morbid obesity) (H)  Refer to psychology for assessment prior to bariatric surgery.  - Adult Mental Health  Referral; Future    Hyperlipidemia with target LDL less than 100  Stable, continue lipitor  - atorvastatin (LIPITOR) 40 MG tablet; Take 1 tablet (40 mg) by mouth daily    Major depressive disorder, recurrent, severe without psychotic features (H)  Well controlled on current regimen  - busPIRone (BUSPAR) 10 MG tablet; Take 1 tablet (10 mg) by mouth daily  - PARoxetine (PAXIL) 40 MG tablet; TAKE ONE TABLET BY MOUTH EVERY DAY    Generalized anxiety disorder  Well controlled.  - busPIRone (BUSPAR) 10 MG tablet; Take 1 tablet (10 mg) by mouth daily  - PARoxetine (PAXIL) 40 MG tablet; TAKE ONE TABLET BY MOUTH EVERY DAY    Dyspepsia  - omeprazole (PRILOSEC) 20 MG DR capsule; Take 1 capsule (20 mg) by mouth daily             BMI:   Estimated body mass index is 39.82 kg/m  as calculated from the following:    Height as of this encounter: 1.626 m (5' 4\").    Weight as of this encounter: 105.2 kg (232 lb).         Dominik Arizmendi [facility] PRIOR RYAN Lock is a 53 year old, presenting for the following health issues:  Referral and Letter Request        10/5/2023     2:43 PM   Additional Questions   Roomed by YASH RAMÍREZ   Accompanied by SELF       History of Present Illness       Reason for visit:  Weight Loss Surgery needs letter or weightloss provider and needs referral for Psych eval    She eats 2-3 servings of fruits and vegetables daily.She consumes 1 sweetened beverage(s) daily.She exercises with enough effort to increase her heart rate 30 to 60 minutes per day.  She exercises with enough effort to increase her heart rate 4 days per week.   She is taking medications regularly.     Currently managed with Paxil and Buspar with great benefit. She has been on Paxil for over 20 years..       5/25/2022    12:39 PM " "11/7/2022     8:39 AM 5/30/2023    12:44 PM   CARLOS-7 SCORE   Total Score 1 (minimal anxiety) 1 (minimal anxiety) 0 (minimal anxiety)   Total Score 1 1 0           5/25/2022    12:38 PM 11/7/2022     8:39 AM 5/30/2023     8:53 AM   PHQ   PHQ-9 Total Score 0 0 0   Q9: Thoughts of better off dead/self-harm past 2 weeks Not at all Not at all Not at all       Review of Systems   Constitutional, HEENT, cardiovascular, pulmonary, gi and gu systems are negative, except as otherwise noted.      Objective    /78   Pulse 75   Temp 98.2  F (36.8  C) (Tympanic)   Resp 12   Ht 1.626 m (5' 4\")   Wt 105.2 kg (232 lb)   SpO2 100%   BMI 39.82 kg/m    Body mass index is 39.82 kg/m .  Physical Exam   GENERAL: healthy, alert and no distress  PSYCH: mentation appears normal, affect normal/bright                "

## 2023-10-05 NOTE — LETTER
October 6, 2023      Hayde Monique  [address]  [facility] [address]        To Whom It May Concern,     Hayde Monique has been a patient of mine since 2020. Her depression and anxiety have been well-controlled with paroxetine (Paxil) and buspirone (Buspar). This is evident through recent office visit conversations and also low PHQ-9 and CARLOS-7 scores. Despite Paxil being associated with weight gain, she has tolerated the medication well for over 20 years and the benefits she receives from it outweigh the risk and would advise she continue her current regimen. I support her decision to pursue bariatric surgery as a means for weight loss and think this would greatly improve her health and quality of life. Please feel free to contact me if you have any additional questions or concerns.    Sincerely,        Dominik Arizmendi, DO

## 2023-10-09 ENCOUNTER — PATIENT OUTREACH (OUTPATIENT)
Dept: CARE COORDINATION | Facility: CLINIC | Age: 53
End: 2023-10-09
Payer: COMMERCIAL

## 2023-10-09 ENCOUNTER — MYC MEDICAL ADVICE (OUTPATIENT)
Dept: SURGERY | Facility: CLINIC | Age: 53
End: 2023-10-09
Payer: COMMERCIAL

## 2023-10-09 NOTE — TELEPHONE ENCOUNTER
Pt seen 8/3/23 for initial surgical consult and has appt 11/6/23.  Plz see ques re: injectables and if you think pt should push her appt out w/you and advise.  I see you already signed off task on letter from PCP on 106/23. Brian Schmidt, MS, RD, RN

## 2023-10-13 ENCOUNTER — TELEPHONE (OUTPATIENT)
Dept: SURGERY | Facility: CLINIC | Age: 53
End: 2023-10-13
Payer: COMMERCIAL

## 2023-10-13 DIAGNOSIS — E66.01 OBESITY, CLASS III, BMI 40-49.9 (MORBID OBESITY) (H): Primary | ICD-10-CM

## 2023-10-13 RX ORDER — SEMAGLUTIDE 0.5 MG/.5ML
0.5 INJECTION, SOLUTION SUBCUTANEOUS WEEKLY
Qty: 2 ML | Refills: 1 | Status: SHIPPED | OUTPATIENT
Start: 2023-10-13 | End: 2023-10-20

## 2023-10-13 RX ORDER — SEMAGLUTIDE 0.25 MG/.5ML
0.25 INJECTION, SOLUTION SUBCUTANEOUS WEEKLY
Qty: 2 ML | Refills: 0 | Status: SHIPPED | OUTPATIENT
Start: 2023-10-13 | End: 2023-10-20

## 2023-10-13 RX ORDER — SEMAGLUTIDE 1 MG/.5ML
1 INJECTION, SOLUTION SUBCUTANEOUS WEEKLY
Qty: 2 ML | Refills: 1 | Status: SHIPPED | OUTPATIENT
Start: 2023-10-13 | End: 2023-10-20

## 2023-10-13 NOTE — TELEPHONE ENCOUNTER
Prior Authorization Retail Medication Request    Medication/Dose: PA:  Semaglutide-Weight Management (WEGOVY) 0.25 MG/0.5ML pen 2 mL 0 10/13/2023  --  Sig - Route: Inject 0.25 mg Subcutaneous once a week For 4 weeks - Subcutaneous  Semaglutide-Weight Management (WEGOVY) 0.5 MG/0.5ML pen 2 mL 1 10/13/2023  --  Sig - Route: Inject 0.5 mg Subcutaneous once a week - Subcutaneous  Semaglutide-Weight Management (WEGOVY) 1 MG/0.5ML pen 2 mL 1 10/13/2023  --  Sig - Route: Inject 1 mg Subcutaneous once a week - Subcutaneous    ICD code (if different than what is on RX):  [E66.01]   Previously Tried and Failed: Phentermine/Adipex-p/Suprenza  Watching portions or calories    Exercise    Weight Watchers    Atkins type diet (low carb/high protein)    Slimfast    Rationale:  Weight management    Insurance Name:  [facility]   Insurance ID:  [id number]       Pharmacy Information (if different than what is on RX)  Name:    [facility] [address] [place] [address]     Phone:  [phone]

## 2023-10-18 NOTE — TELEPHONE ENCOUNTER
Prior Authorization Approval    Authorization Effective Date: 10/18/2023  Authorization Expiration Date: 5/15/2024  Medication: Semaglutide-Weight Management (WEGOVY) 0.25 MG/0.5ML pen-APPROVED  Reference #:     Insurance Company: [employer] - Phone [phone] Fax [phone]  Which Pharmacy is filling the prescription (Not needed for infusion/clinic administered): [facility] [address] [place] [facility] [address]  Pharmacy Notified: Yes  Patient Notified: Instructed pharmacy to notify patient when script is ready to /ship.

## 2023-10-18 NOTE — TELEPHONE ENCOUNTER
Central Prior Authorization Team   Phone: [phone]    PA Initiation    Medication: WEGOVY 0.25 MG/0.5ML SC SOAJ  Insurance Company: [employer] - Phone [phone] Fax [phone]  Pharmacy Filling the Rx: [facility] [address] [place] - [address]  Filling Pharmacy Phone: [phone]  Filling Pharmacy Fax:    Start Date: 10/18/2023

## 2023-10-20 RX ORDER — SEMAGLUTIDE 0.5 MG/.5ML
0.5 INJECTION, SOLUTION SUBCUTANEOUS WEEKLY
Qty: 2 ML | Refills: 1 | Status: SHIPPED | OUTPATIENT
Start: 2023-10-20 | End: 2023-10-27

## 2023-10-20 RX ORDER — SEMAGLUTIDE 0.25 MG/.5ML
0.25 INJECTION, SOLUTION SUBCUTANEOUS WEEKLY
Qty: 2 ML | Refills: 0 | Status: SHIPPED | OUTPATIENT
Start: 2023-10-20 | End: 2023-10-27

## 2023-10-20 RX ORDER — SEMAGLUTIDE 1 MG/.5ML
1 INJECTION, SOLUTION SUBCUTANEOUS WEEKLY
Qty: 2 ML | Refills: 1 | Status: SHIPPED | OUTPATIENT
Start: 2023-10-20 | End: 2023-10-27

## 2023-10-23 ENCOUNTER — PATIENT OUTREACH (OUTPATIENT)
Dept: CARE COORDINATION | Facility: CLINIC | Age: 53
End: 2023-10-23
Payer: COMMERCIAL

## 2023-10-27 ENCOUNTER — TELEPHONE (OUTPATIENT)
Dept: SURGERY | Facility: CLINIC | Age: 53
End: 2023-10-27
Payer: COMMERCIAL

## 2023-10-27 DIAGNOSIS — E66.01 OBESITY, CLASS III, BMI 40-49.9 (MORBID OBESITY) (H): ICD-10-CM

## 2023-10-27 RX ORDER — SEMAGLUTIDE 0.5 MG/.5ML
0.5 INJECTION, SOLUTION SUBCUTANEOUS WEEKLY
Qty: 2 ML | Refills: 1 | Status: SHIPPED | OUTPATIENT
Start: 2023-10-27 | End: 2023-12-12

## 2023-10-27 RX ORDER — SEMAGLUTIDE 0.25 MG/.5ML
0.25 INJECTION, SOLUTION SUBCUTANEOUS WEEKLY
Qty: 2 ML | Refills: 0 | Status: SHIPPED | OUTPATIENT
Start: 2023-10-27 | End: 2023-12-12

## 2023-10-27 RX ORDER — SEMAGLUTIDE 1 MG/.5ML
1 INJECTION, SOLUTION SUBCUTANEOUS WEEKLY
Qty: 2 ML | Refills: 1 | Status: SHIPPED | OUTPATIENT
Start: 2023-10-27 | End: 2023-12-12

## 2023-10-27 NOTE — TELEPHONE ENCOUNTER
Pt wants to try sending the Wegovy to [facility] mail order pharmacy.  Has not started any of the medication yet.  Will transfer again.    Suzan EUBANKS RN

## 2023-10-28 ENCOUNTER — MYC REFILL (OUTPATIENT)
Dept: FAMILY MEDICINE | Facility: CLINIC | Age: 53
End: 2023-10-28
Payer: COMMERCIAL

## 2023-10-28 DIAGNOSIS — F41.1 GENERALIZED ANXIETY DISORDER: ICD-10-CM

## 2023-10-28 DIAGNOSIS — F33.2 MAJOR DEPRESSIVE DISORDER, RECURRENT, SEVERE WITHOUT PSYCHOTIC FEATURES (H): ICD-10-CM

## 2023-10-30 RX ORDER — PAROXETINE 40 MG/1
TABLET, FILM COATED ORAL
Qty: 90 TABLET | Refills: 3 | OUTPATIENT
Start: 2023-10-30

## 2023-11-01 NOTE — PROGRESS NOTES
"Hayde is a 53 year old who is being evaluated via a billable video visit.      The patient has been notified of following:     \"This video visit will be conducted via a call between you and your physician/provider. We have found that certain health care needs can be provided without the need for an in-person physical exam.  This service lets us provide the care you need with a video conversation.  If a prescription is necessary we can send it directly to your pharmacy.  If lab work is needed we can place an order for that and you can then stop by our lab to have the test done at a later time.    Video visits are billed at different rates depending on your insurance coverage.  Please reach out to your insurance provider with any questions.    If during the course of the call the physician/provider feels a video visit is not appropriate, you will not be charged for this service.\"    Patient has given verbal consent for Video visit? Yes    How would you like to obtain your AVS? MyChart    If the video visit is dropped, the invitation should be resent by:MY CHART  Will anyone else be joining your video visit? No    I    Video-Visit Details    Type of service:  Video Visit    Video Start Time: 9:02 AM    Video End Time:9:27 AM    Originating Location (pt. Location): Home in [place]    Distant Location (provider location):  [facility] [place]     Platform used for Video Visit: [employer]            2023      Return Medical Weight Management Note     Hayde Monique  MRN:  [id number]  :  1970    Dear Dominik Arizmendi, DO,    I had the pleasure of seeing your patient Hayde Monique. She is a 53 year old female who I am continuing to see for treatment of obesity related to:        2023    10:07 AM   --   I have the following health issues associated with obesity High Cholesterol       Assessment & Plan   Problem List Items Addressed This Visit       Obesity, Class III, BMI 40-49.9 " (morbid obesity) (H) - Primary     Pre-surgery visit. Reviewed task list and bariatric education visit provided. No additional questions. Plan to start Saxenda once available for medical weight management while working on surgical process.         Elevated cholesterol     Noted in labs from 2022. Anticipate improvement with weight loss.           AOM Considerations:  Phentermine:  SSRI consideration with paroxetine and buspirone  Topiramate: Birth control: PostmenopausalCNS depression with paroxetine  GLP-1: Monitor blood sugars, Wegovy and Saxenda appear covered   Naltrexone: No drug interactions seen   Wellbutrin: Monitor response to paroxetine  Metformin: Monitor blood sugars   Contrave:  Qsymia:           PATIENT INSTRUCTIONS:  Plan:  Schedule next dietitian.  Start Saxenda, once available - you may consider switching to 1.0 Wegovy once you are tolerating 2.4 mg Saxenda well. Please let us know on MyChart about switching so we can advise you/ensure next scripts are sent in if needed.  Week 1- Inject 0.6 mg daily  Week 2- Inject 1.2 mg daily  Week 3- Inject 1.8 mg daily (If hunger and portions controlled stay at this dose)    May use famotidine 20 mg BID as needed for nausea/heartburn when starting the medication.       Goals:  Continue to work with dietitian.   Bariatric task list still needed:   Goal weight: 229 lbs    Psychological Evaluation with MMPI and clearance for weight loss surgery.     Achieve clearance from dietitian to see surgeon. Met with once    Double check your insurance notes and MyChart us any specific/additional pre-surgery requirements    Sleep clearance - scheduled with sleep doctor     FOLLOW-UP:    Please call [phone] to schedule your next visit in 3 months.    35 minutes spent on the date of the encounter doing chart review, history and exam, result review, counseling, developing plan of care, documentation, and further activities as noted      INTERVAL HISTORY:  Hayde returns for  medical weight management follow up.  Last seen on 8/3/23 to start Bariatric surgery process - on discussion with [employer] was looking into switching to Medical management. Tried for Wegovy 10/13/23 - switch to try for Saxenda 11/23 d/t Wegovy shortage - did just get 1.0 mg Wegovy shipped though.     BARIATRIC TASK LIST  Required Weight loss:     Lose 5 lbs prior to surgery.  Goal Weight: 229 lbs     Tasks:     Have preoperative laboratory tests drawn. completed     Psychological Evaluation with MMPI and clearance for weight loss surgery.     Achieve clearance from dietitian to see surgeon. Met with once     In Person Bariatric Education Visit - today     Double check your insurance notes and [employer] us any specific/additional pre-surgery requirements     Mental Health clearance from primary care completed     Dietitian clearance     Sleep clearance - scheduled with sleep doctor regardless     Reviewed above task list today.     We will discussed the available weight loss surgeries - both sleeve and bypass including risks and benefits such as PE/DVT, infections, perforations, death, weight gain, N/V/GI issues, strictures, and malabsorption. Emphasized importance of life long yearly follow-up. She had no additional questions regarding the two surgery options - not completely decided at this time.    WEIGHT METRICS:  Body mass index is 39.48 kg/m .   Current Weight: 230 lb (104.3 kg)  Last Visits Weight: 234 lb 12.8 oz (106.5 kg)  Initial Weight (lbs): 234 lbs  Cumulative weight loss (lbs): 4  Weight Loss Percentage: 1.71%    Weight is actually 230 lbs today not 130 lbs    Wt Readings from Last 10 Encounters:   11/06/23 230 lb (104.3 kg)   10/05/23 232 lb (105.2 kg)   08/04/23 234 lb 12.8 oz (106.5 kg)   08/03/23 234 lb 12.8 oz (106.5 kg)   06/22/23 233 lb (105.7 kg)   05/30/23 229 lb (103.9 kg)   02/21/23 225 lb (102.1 kg)   12/20/22 223 lb (101.2 kg)   11/08/22 223 lb (101.2 kg)   06/27/22 220 lb (99.8 kg)       Taking vitamins and decrease caffeine - has been working on exercise. Mowing lawn unnecessarily even! Working towards    Leaning towards sleeve     MEDICATIONS:   Current Outpatient Medications   Medication Sig Dispense Refill    atorvastatin (LIPITOR) 40 MG tablet Take 1 tablet (40 mg) by mouth daily 90 tablet 3    busPIRone (BUSPAR) 10 MG tablet Take 1 tablet (10 mg) by mouth daily 90 tablet 3    insulin pen needle (31G X 5 MM) 31G X 5 MM miscellaneous Use 1 pen needles daily or as directed. 100 each 11    liraglutide - Weight Management (SAXENDA) 18 MG/3ML pen Week 1: 0.6 mg subcutaneous daily, Week 2: 1.2 mg daily, Week 3: 1.8 mg daily, Week 4: 2.4 mg daily, Week 5 & on: 3 mg daily 15 mL 3    omeprazole (PRILOSEC) 20 MG DR capsule Take 1 capsule (20 mg) by mouth daily 90 capsule 3    PARoxetine (PAXIL) 40 MG tablet TAKE ONE TABLET BY MOUTH EVERY DAY 90 tablet 3    Semaglutide-Weight Management (WEGOVY) 0.25 MG/0.5ML pen Inject 0.25 mg Subcutaneous once a week For 4 weeks (Patient not taking: Reported on 11/6/2023) 2 mL 0    Semaglutide-Weight Management (WEGOVY) 0.5 MG/0.5ML pen Inject 0.5 mg Subcutaneous once a week (Patient not taking: Reported on 11/6/2023) 2 mL 1    Semaglutide-Weight Management (WEGOVY) 1 MG/0.5ML pen Inject 1 mg Subcutaneous once a week (Patient not taking: Reported on 11/6/2023) 2 mL 1       LABS:  Hemoglobin A1C   Date Value Ref Range Status   09/26/2023 5.2 0.0 - 5.6 % Final     Comment:     Normal <5.7%   Prediabetes 5.7-6.4%    Diabetes 6.5% or higher     Note: Adopted from ADA consensus guidelines.     Vitamin D Deficiency screening   Date Value Ref Range Status   06/23/2020 47 20 - 75 ug/L Final     Comment:     Season, race, dietary intake, and treatment affect the concentration of   25-hydroxy-Vitamin D. Values may decrease during winter months and increase   during summer months. Values 20-29 ug/L may indicate Vitamin D insufficiency   and values <20 ug/L may indicate  Vitamin D deficiency.  Vitamin D determination is routinely performed by an immunoassay specific for   25 hydroxyvitamin D3.  If an individual is on vitamin D2 (ergocalciferol)   supplementation, please specify 25 OH vitamin D2 and D3 level determination by   LCMSMS test [id number].       Vitamin D, Total (25-Hydroxy)   Date Value Ref Range Status   09/26/2023 21 20 - 50 ng/mL Final     Comment:     optimum levels     TSH   Date Value Ref Range Status   09/26/2023 2.65 0.30 - 4.20 uIU/mL Final   06/23/2020 3.04 0.40 - 4.00 mU/L Final     Sodium   Date Value Ref Range Status   09/26/2023 144 135 - 145 mmol/L Final     Comment:     Reference intervals for this test were updated on 09/26/2023 to more accurately reflect our healthy population. There may be differences in the flagging of prior results with similar values performed with this method. Interpretation of those prior results can be made in the context of the updated reference intervals.    06/23/2020 137 133 - 144 mmol/L Final     Potassium   Date Value Ref Range Status   09/26/2023 4.3 3.4 - 5.3 mmol/L Final   11/08/2022 4.1 3.4 - 5.3 mmol/L Final     Comment:     Specimen slightly hemolyzed, potassium may be falsely elevated.   06/23/2020 3.9 3.4 - 5.3 mmol/L Final     Chloride   Date Value Ref Range Status   09/26/2023 107 98 - 107 mmol/L Final   11/08/2022 105 94 - 109 mmol/L Final   06/23/2020 105 94 - 109 mmol/L Final     Carbon Dioxide   Date Value Ref Range Status   06/23/2020 25 20 - 32 mmol/L Final     Carbon Dioxide (CO2)   Date Value Ref Range Status   09/26/2023 23 22 - 29 mmol/L Final   11/08/2022 26 20 - 32 mmol/L Final     Anion Gap   Date Value Ref Range Status   09/26/2023 14 7 - 15 mmol/L Final   11/08/2022 9 3 - 14 mmol/L Final   06/23/2020 7 3 - 14 mmol/L Final     Glucose   Date Value Ref Range Status   09/26/2023 92 70 - 99 mg/dL Final   11/08/2022 89 70 - 99 mg/dL Final   06/23/2020 87 70 - 99 mg/dL Final     Comment:     Fasting specimen      Urea Nitrogen   Date Value Ref Range Status   09/26/2023 10.0 6.0 - 20.0 mg/dL Final   11/08/2022 14 7 - 30 mg/dL Final   06/23/2020 13 7 - 30 mg/dL Final     Creatinine   Date Value Ref Range Status   09/26/2023 0.56 0.51 - 0.95 mg/dL Final   06/23/2020 0.62 0.52 - 1.04 mg/dL Final     GFR Estimate   Date Value Ref Range Status   09/26/2023 >90 >60 mL/min/1.73m2 Final   06/23/2020 >90 >60 mL/min/[1.73_m2] Final     Comment:     Non  GFR Calc  Starting 12/18/2018, serum creatinine based estimated GFR (eGFR) will be   calculated using the Chronic Kidney Disease Epidemiology Collaboration   (CKD-EPI) equation.       Calcium   Date Value Ref Range Status   09/26/2023 8.9 8.6 - 10.0 mg/dL Final   06/23/2020 9.1 8.5 - 10.1 mg/dL Final     Bilirubin Total   Date Value Ref Range Status   09/26/2023 0.3 <=1.2 mg/dL Final   06/23/2020 0.5 0.2 - 1.3 mg/dL Final     Alkaline Phosphatase   Date Value Ref Range Status   09/26/2023 86 35 - 104 U/L Final   06/23/2020 87 40 - 150 U/L Final     ALT   Date Value Ref Range Status   09/26/2023 59 (H) 0 - 50 U/L Final     Comment:     Reference intervals for this test were updated on 6/12/2023 to more accurately reflect our healthy population. There may be differences in the flagging of prior results with similar values performed with this method. Interpretation of those prior results can be made in the context of the updated reference intervals.     06/23/2020 50 0 - 50 U/L Final     AST   Date Value Ref Range Status   09/26/2023 39 0 - 45 U/L Final     Comment:     Reference intervals for this test were updated on 6/12/2023 to more accurately reflect our healthy population. There may be differences in the flagging of prior results with similar values performed with this method. Interpretation of those prior results can be made in the context of the updated reference intervals.   06/23/2020 21 0 - 45 U/L Final     Cholesterol   Date Value Ref Range Status  "  11/08/2022 231 (H) <200 mg/dL Final   03/01/2019 316 (H) <200 mg/dL Final     Comment:     Desirable:       <200 mg/dl     HDL Cholesterol   Date Value Ref Range Status   03/01/2019 54 >49 mg/dL Final     Direct Measure HDL   Date Value Ref Range Status   11/08/2022 52 >=50 mg/dL Final     LDL Cholesterol Calculated   Date Value Ref Range Status   11/08/2022 147 (H) <=100 mg/dL Final   03/01/2019 221 (H) <100 mg/dL Final     Comment:     Above desirable:  100-129 mg/dl  Borderline High:  130-159 mg/dL  High:             160-189 mg/dL  Very high:       >189 mg/dl       Triglycerides   Date Value Ref Range Status   11/08/2022 161 (H) <150 mg/dL Final   03/01/2019 207 (H) <150 mg/dL Final     Comment:     Borderline high:  150-199 mg/dl  High:             200-499 mg/dl  Very high:       >499 mg/dl       WBC   Date Value Ref Range Status   06/23/2020 7.0 4.0 - 11.0 10e9/L Final     WBC Count   Date Value Ref Range Status   05/30/2023 7.8 4.0 - 11.0 10e3/uL Final     Hemoglobin   Date Value Ref Range Status   05/30/2023 12.6 11.7 - 15.7 g/dL Final   06/23/2020 12.5 11.7 - 15.7 g/dL Final     Hematocrit   Date Value Ref Range Status   05/30/2023 38.8 35.0 - 47.0 % Final   06/23/2020 39.3 35.0 - 47.0 % Final     MCV   Date Value Ref Range Status   05/30/2023 92 78 - 100 fL Final   06/23/2020 95 78 - 100 fl Final     Platelet Count   Date Value Ref Range Status   05/30/2023 216 150 - 450 10e3/uL Final   06/23/2020 239 150 - 450 10e9/L Final         BP Readings from Last 6 Encounters:   10/05/23 118/78   08/03/23 138/82   06/22/23 130/88   05/30/23 134/80   05/03/23 136/85   02/21/23 124/80       Pulse Readings from Last 6 Encounters:   10/05/23 75   08/03/23 83   05/30/23 99   05/03/23 86   01/11/23 70   11/08/22 110       PE:  Ht 5' 4\" (1.626 m)   Wt 230 lb (104.3 kg)   BMI 39.48 kg/m    GENERAL: Healthy, alert and no distress  EYES: Eyes grossly normal to inspection.    RESP: No audible wheeze, cough, or visible " cyanosis.  No increased work of breathing.    SKIN: Visible skin clear. No significant rash, abnormal pigmentation or lesions.  NEURO: Mentation and speech appropriate for age.  PSYCH: Mentation appears normal, affect normal/bright, judgement and insight intact, normal speech and appearance well-groomed.      Sincerely,      Kate Gupta PA-C

## 2023-11-02 DIAGNOSIS — E66.01 OBESITY, CLASS III, BMI 40-49.9 (MORBID OBESITY) (H): Primary | ICD-10-CM

## 2023-11-02 NOTE — PATIENT INSTRUCTIONS
MEDICATION STARTED AT THIS APPOINTMENT    We are starting a GLP-1 (Glucagon-like Peptide-1) medication called Saxenda. One of the ways it works is by slowing down the rate that food leaves your stomach. You feel bella and will eat less. It also helps regulate hormones that can help improve your blood sugars.    If you are a patient that checks blood sugars, continue to check as instructed by your doctor. Low blood sugars are rare but can happen if patients are on insulin or other oral agents. If you notice consistent low sugars or high sugars, your medication may need to be adjusted after your appointment. If this is the case, please call RN and provide her your blood sugar record from the last 3-4 days. The RN will get in touch with the doctor and call you back/AOB[employer] message with recommendations. We tolerate high sugars for a bit, so if sugars are running 180-200, this is ok. As weight starts dropping the blood sugars should too. If readings are consistently over 200 for 1-2 weeks, then you should call the doctor/nurse.    Dosing for this medication:   Week 1- Inject 0.6 mg daily  Week 2- Inject 1.2 mg daily  Week 3- Inject 1.8 mg daily  Week 4- Inject 2.4 mg daily  Week 5 and thereafter- Inject 3.0 mg daily    Side effects of GLP- Medications include: The most common side effects are all GI related and consist of: nausea, constipation, diarrhea, burping, or gassiness. Patients are advised to eat slowly and less, and nausea typically passes if people can stick it out.     The risk of pancreatitis (inflammation of the pancreas) has been associated with this type of medication, but is very rare.  If you have had pancreatitis in the past, this medication may not be for you. Please let us know about any past history of pancreas problems.    Symptoms of pancreatitis include: Pain in your upper stomach area which may travel to your back and be worse after eating. Your stomach area may be tender to the touch.  You may  have vomiting or nausea and/or have a fever. If you should develop any of these symptoms, stop the medication and contact your primary care doctor. They will do a blood test to check for pancreatitis.         There is a small chance you may have some low blood sugar after taking the medication.   The signs of low blood sugar are:  Weakness  Shaky   Hungry  Sweating  Confusion      See below for ways to treat low blood sugar without adding in lots of extra calories.      Treating Low Blood Sugar    If you have symptoms of low blood sugar (sweating, shaking, dizzy, confused) eat 15 grams of carbs and wait 15 minutes:    Glucose Tabs are best for sugars under 70 -  Dex4 or BD Glucose tablets are good, you will need to take 3-4 of these to equal 15 grams.     One small box of raisins  4 oz fruit juice box or   cup fruit juice  1 small apple  1 small banana    cup canned fruit in water    English muffin or a slice of bread with jelly   1 low fat frozen waffle with sugar-free syrup    cup cottage cheese with   cup frozen or fresh blueberries  1 cup skim or low-fat milk    cup whole grain cereal  4-6 crackers such as Triscuits      This medication is usually not covered by insurance and can be quite expensive. Sometimes a prior authorization is required, which may take up to 1-2 weeks for an insurance company to make a decision if they will cover the medication. Please be patient, you will be notified after a decision has been made.    Contact the nurse via [facility] or call [phone] if you have any questions or concerns. (Do not stop taking it if you don't think it's working. For some people it works without them knowing it.)     In order to get refills of this or any medication we prescribe you must be seen in the medical weight mgmt clinic every 2-4 months.

## 2023-11-03 ENCOUNTER — TELEPHONE (OUTPATIENT)
Dept: SURGERY | Facility: CLINIC | Age: 53
End: 2023-11-03
Payer: COMMERCIAL

## 2023-11-03 NOTE — TELEPHONE ENCOUNTER
Prior Authorization Retail Medication Request    Medication/Dose: PA:  liraglutide - Weight Management (SAXENDA) 18 MG/3ML pen 15 mL 3 11/2/2023  No  Sig: Week 1: 0.6 mg subcutaneous daily, Week 2: 1.2 mg daily, Week 3: 1.8 mg daily, Week 4: 2.4 mg daily, Week 5 & on: 3 mg daily    ICD code (if different than what is on RX):  [E66.01]   Previously Tried and Failed: Phentermine/Adipex-p/Suprenza  Watching portions or calories    Exercise    Weight Watchers    Atkins type diet (low carb/high protein)    Slimfast     Rationale:  Weight management     Insurance Name:  [facility]   Insurance ID:  [id number]         Pharmacy Information (if different than what is on RX)  Name:    [facility] [place]  [address]      Phone:  [phone]

## 2023-11-06 ENCOUNTER — VIRTUAL VISIT (OUTPATIENT)
Dept: SURGERY | Facility: CLINIC | Age: 53
End: 2023-11-06
Payer: COMMERCIAL

## 2023-11-06 VITALS — HEIGHT: 64 IN | WEIGHT: 230 LBS | BODY MASS INDEX: 39.27 KG/M2

## 2023-11-06 DIAGNOSIS — E66.01 OBESITY, CLASS III, BMI 40-49.9 (MORBID OBESITY) (H): Primary | ICD-10-CM

## 2023-11-06 DIAGNOSIS — E78.00 ELEVATED CHOLESTEROL: ICD-10-CM

## 2023-11-06 PROCEDURE — 99214 OFFICE O/P EST MOD 30 MIN: CPT | Mod: 95 | Performed by: PHYSICIAN ASSISTANT

## 2023-11-06 NOTE — PATIENT INSTRUCTIONS
"To ensure quality you may receive a patient satisfaction survey. The greatest compliment you can give is \"Likely to Recommend.\"    Nice to talk with you today.  Thank you for your trust. Below is the plan discussed.-  Kate Gupta PA-C       Plan:  Schedule next dietitian.  Start Saxenda, once available - you may consider switching to 1.0 Wegovy once you are tolerating 2.4 mg Saxenda well. Please let us know on MyChart about switching so we can advise you/ensure next scripts are sent in if needed.  Week 1- Inject 0.6 mg daily  Week 2- Inject 1.2 mg daily  Week 3- Inject 1.8 mg daily (If hunger and portions controlled stay at this dose)    May use famotidine 20 mg BID as needed for nausea/heartburn when starting the medication.       Goals:  Continue to work with dietitian.   Bariatric task list still needed:   Goal weight: 229 lbs    Psychological Evaluation with MMPI and clearance for weight loss surgery.     Achieve clearance from dietitian to see surgeon. Met with once    Double check your insurance notes and MyChart us any specific/additional pre-surgery requirements    Sleep clearance - scheduled with sleep doctor     FOLLOW-UP:    Please call [phone] to schedule your next visit in 3 months.    General Bariatric Surgery information:  _________________________________________________  In general before being submitted for insurance approval, you will need the following:  -Clearance by the Psychologist  -Clearance by the dietitian  -Surgeon consult  -Use CPAP for at least one month before surgery if you have sleep apnea. Bring to hospital day of surgery.  -Tobacco free for 3 months before surgery and remain a non-smoker thereafter.   -If you have diabetes, A1C less than 8 before surgery  ____________________________________________________________________  After Bariatric Surgery:   Vitamin supplementation is a lifelong need. You will take:  B-12 daily or by injection monthly  Vitamin D3 5000 IU daily   2 " Multivitamins containing 18mg of iron  Calcium citrate 2 daily  Thiamine 100 mg weekly   Vitron C once daily if you are a menstruating female  Follow up is impotent to keep your weight off and your vitamin levels up.  Your labs will be monitored every 3 months for the first year and yearly thereafter.  Prevent Ulcers by avoiding tobacco and anti-inflammatories (NSAIDS) forever.  Also alcohol and caffeine in excess. NSAIDS examples: Aspirin, Ibuprofen, Naproxen) Tylenol is fine.    Protect you stomach if on Asprin.  If on Aspirin to protect your heart or for another reason, make sure to take a PPI like (omeprazole, protonix, nexium, prevacid) to protect your stomach.    Alcohol affects you differently. There is a 10% increase of Alcohol Use Disorder in patients with bariatric surgery.   If you have even ONE drink DO NOT DRIVE.

## 2023-11-06 NOTE — ASSESSMENT & PLAN NOTE
Pre-surgery visit. Reviewed task list and bariatric education visit provided. No additional questions. Plan to start Saxenda once available for medical weight management while working on surgical process.

## 2023-11-08 NOTE — TELEPHONE ENCOUNTER
Prior Authorization Approval    Authorization Effective Date: 11/8/2023  Authorization Expiration Date: 3/7/2024  Medication: PA:  liraglutide - Weight Management (SAXENDA) 18 MG/3ML pen 15 mL 3 11/2/2023  No  Sig: Week 1: 0.6 mg subcutaneous daily, Week 2: 1.2 mg daily, Week 3: 1.8 mg daily, Week 4: 2.4 mg daily, Week 5 & on: 3 mg daily-PA APPROVED   Approved Dose/Quantity:   Reference #:     Insurance Company: [employer] [phone] Fax [phone]  Expected CoPay:       CoPay Card Available:      Foundation Assistance Needed:    Which Pharmacy is filling the prescription (Not needed for infusion/clinic administered): [facility] - [place], [place] - [address]  Pharmacy Notified:  Yes  Patient Notified:  No

## 2023-11-08 NOTE — TELEPHONE ENCOUNTER
Central Prior Authorization Team   Phone: [phone]    PA Initiation    Medication: PA:  liraglutide - Weight Management (SAXENDA) 18 MG/3ML pen 15 mL 3 11/2/2023  No  Sig: Week 1: 0.6 mg subcutaneous daily, Week 2: 1.2 mg daily, Week 3: 1.8 mg daily, Week 4: 2.4 mg daily, Week 5 & on: 3 mg daily      Insurance Company: [employer] - Phone [phone] Fax [phone]  Pharmacy Filling the Rx: [facility] - [place], [place] - [facility] [address]  Filling Pharmacy Phone: [phone]  Filling Pharmacy Fax:    Start Date: 11/8/2023

## 2023-12-05 ENCOUNTER — MYC MEDICAL ADVICE (OUTPATIENT)
Dept: SURGERY | Facility: CLINIC | Age: 53
End: 2023-12-05
Payer: COMMERCIAL

## 2023-12-05 DIAGNOSIS — E66.01 OBESITY, CLASS III, BMI 40-49.9 (MORBID OBESITY) (H): ICD-10-CM

## 2023-12-06 ENCOUNTER — TELEPHONE (OUTPATIENT)
Dept: SURGERY | Facility: CLINIC | Age: 53
End: 2023-12-06
Payer: COMMERCIAL

## 2023-12-06 NOTE — TELEPHONE ENCOUNTER
*Pt states she has 2 insurances- would like the Wegovy also sent through . Currently has approval through [employer] [place] [employer]*    Prior Authorization Retail Medication Request    Medication/Dose: PA:  Semaglutide-Weight Management (WEGOVY) 0.25 MG/0.5ML pen 2 mL 0 10/13/2023 --   Sig - Route: Inject 0.25 mg Subcutaneous once a week For 4 weeks - Subcutaneous   Semaglutide-Weight Management (WEGOVY) 0.5 MG/0.5ML pen 2 mL 1 10/13/2023 --   Sig - Route: Inject 0.5 mg Subcutaneous once a week - Subcutaneous   Semaglutide-Weight Management (WEGOVY) 1 MG/0.5ML pen 2 mL 1 10/13/2023 --   Sig - Route: Inject 1 mg Subcutaneous once a week - Subcutaneous     Diagnosis and ICD code (if different than what is on RX):  [E66.01]    New/renewal/insurance change PA/secondary ins. PA:  Previously Tried and Failed: Phentermine/Adipex-p/Suprenza  Watching portions or calories    Exercise    Weight Watchers    Atkins type diet (low carb/high protein)    Slimfast  Rationale:  Weight management    Insurance   Primary: [employer] OPEN ACCESS   Insurance ID:  [id number]     Pharmacy Information (if different than what is on RX)  Name:    [facility] - [place], [place] - [facility] [address]     Phone:  [phone]   Fax: [phone]

## 2023-12-08 ENCOUNTER — TELEPHONE (OUTPATIENT)
Dept: SURGERY | Facility: CLINIC | Age: 53
End: 2023-12-08
Payer: COMMERCIAL

## 2023-12-08 NOTE — TELEPHONE ENCOUNTER
Prior Authorization Approval    Medication: WEGOVY 0.5 MG/0.5ML SC SOAJ  Authorization Effective Date: 11/8/2023  Authorization Expiration Date: 12/7/2024  Approved Dose/Quantity:   Reference #:     Insurance Company: [employer] - Phone [phone] Fax [phone]  Expected CoPay: $    CoPay Card Available:      Financial Assistance Needed:   Which Pharmacy is filling the prescription: [facility] - [place] - [address] [facility] AT PORTAL TO [facility]  Pharmacy Notified: YES  Patient Notified: **Instructed pharmacy to notify patient when script is ready to /ship.**

## 2023-12-08 NOTE — TELEPHONE ENCOUNTER
PA Initiation    Medication: WEGOVY 0.25 MG/0.5ML SC SOAJ  Insurance Company: [employer] - Phone [phone] Fax [phone]  [facility] [place] - [address] [facility] AT [address]AL TO [facility] [address]  Filling Pharmacy Phone: [phone]  Filling Pharmacy Fax: [phone]  Start Date: 12/8/2023

## 2023-12-08 NOTE — TELEPHONE ENCOUNTER
Prior Authorization Approval    Medication: WEGOVY 1 MG/0.5ML SC SOAJ  Authorization Effective Date: 11/8/2023  Authorization Expiration Date: 12/7/2024  Approved Dose/Quantity:   Reference #:     Insurance Company: [employer] - Phone [phone] Fax [phone]  Expected CoPay: $    CoPay Card Available:      Financial Assistance Needed:   Which Pharmacy is filling the prescription: [facility] - [place] - [address] [facility] AT [address]AL TO [facility]  Pharmacy Notified: YES  Patient Notified: **Instructed pharmacy to notify patient when script is ready to /ship.**

## 2023-12-08 NOTE — TELEPHONE ENCOUNTER
PA Initiation    Medication: WEGOVY 0.5 MG/0.5ML SC SOAJ  Insurance Company: [employer] - Phone [phone] Fax [phone]  [facility] [place] - [address] [facility] AT [address]AL TO [facility] SIT[address]  Filling Pharmacy Phone: [phone]  Filling Pharmacy Fax: [phone]  Start Date: 12/8/2023

## 2023-12-08 NOTE — TELEPHONE ENCOUNTER
PA Initiation    Medication: WEGOVY 1 MG/0.5ML SC SOAJ  Insurance Company: [employer] - Phone [phone] Fax [phone]  Pharmacy Filling the Rx: [facility] - [place] - [address] [facility] AT PORTAL TO [facility] SIT[address]  Filling Pharmacy Phone: [phone]  Filling Pharmacy Fax: [phone]  Start Date: 12/8/2023

## 2023-12-08 NOTE — TELEPHONE ENCOUNTER
Prior Authorization Approval    Medication: WEGOVY 0.25 MG/0.5ML SC SOAJ  Authorization Effective Date: 11/8/2023  Authorization Expiration Date: 12/7/2024  Approved Dose/Quantity:   Reference #:     Insurance Company: [employer] - Phone [phone] Fax [phone]  Expected CoPay: $    CoPay Card Available:      Financial Assistance Needed:   Which Pharmacy is filling the prescription: [facility] - [place] - [address] [facility] AT [address]AL TO [facility]  Pharmacy Notified: YES  Patient Notified: **Instructed pharmacy to notify patient when script is ready to /ship.**

## 2023-12-12 RX ORDER — SEMAGLUTIDE 0.25 MG/.5ML
0.25 INJECTION, SOLUTION SUBCUTANEOUS WEEKLY
Qty: 2 ML | Refills: 0 | Status: SHIPPED | OUTPATIENT
Start: 2023-12-12 | End: 2024-01-09

## 2023-12-12 RX ORDER — SEMAGLUTIDE 0.5 MG/.5ML
0.5 INJECTION, SOLUTION SUBCUTANEOUS WEEKLY
Qty: 2 ML | Refills: 1 | Status: SHIPPED | OUTPATIENT
Start: 2023-12-12 | End: 2024-01-09

## 2023-12-12 RX ORDER — SEMAGLUTIDE 1 MG/.5ML
1 INJECTION, SOLUTION SUBCUTANEOUS WEEKLY
Qty: 2 ML | Refills: 1 | Status: SHIPPED | OUTPATIENT
Start: 2023-12-12 | End: 2024-04-01

## 2023-12-15 ENCOUNTER — MYC REFILL (OUTPATIENT)
Dept: SURGERY | Facility: CLINIC | Age: 53
End: 2023-12-15
Payer: COMMERCIAL

## 2023-12-15 ENCOUNTER — MYC REFILL (OUTPATIENT)
Dept: FAMILY MEDICINE | Facility: CLINIC | Age: 53
End: 2023-12-15
Payer: COMMERCIAL

## 2023-12-15 DIAGNOSIS — F41.1 GENERALIZED ANXIETY DISORDER: ICD-10-CM

## 2023-12-15 DIAGNOSIS — E66.01 OBESITY, CLASS III, BMI 40-49.9 (MORBID OBESITY) (H): ICD-10-CM

## 2023-12-15 DIAGNOSIS — R10.13 DYSPEPSIA: ICD-10-CM

## 2023-12-15 DIAGNOSIS — E78.5 HYPERLIPIDEMIA WITH TARGET LDL LESS THAN 100: ICD-10-CM

## 2023-12-15 DIAGNOSIS — F33.2 MAJOR DEPRESSIVE DISORDER, RECURRENT, SEVERE WITHOUT PSYCHOTIC FEATURES (H): ICD-10-CM

## 2023-12-15 RX ORDER — PAROXETINE 40 MG/1
TABLET, FILM COATED ORAL
Qty: 90 TABLET | Refills: 0 | Status: SHIPPED | OUTPATIENT
Start: 2023-12-15 | End: 2024-02-25

## 2023-12-15 RX ORDER — ATORVASTATIN CALCIUM 40 MG/1
40 TABLET, FILM COATED ORAL DAILY
Qty: 60 TABLET | Refills: 0 | Status: SHIPPED | OUTPATIENT
Start: 2023-12-15 | End: 2024-02-13

## 2023-12-15 RX ORDER — BUSPIRONE HYDROCHLORIDE 10 MG/1
10 TABLET ORAL DAILY
Qty: 90 TABLET | Refills: 0 | Status: SHIPPED | OUTPATIENT
Start: 2023-12-15 | End: 2024-02-13

## 2023-12-15 ASSESSMENT — SLEEP AND FATIGUE QUESTIONNAIRES
HOW LIKELY ARE YOU TO NOD OFF OR FALL ASLEEP WHEN YOU ARE A PASSENGER IN A CAR FOR AN HOUR WITHOUT A BREAK: MODERATE CHANCE OF DOZING
HOW LIKELY ARE YOU TO NOD OFF OR FALL ASLEEP WHILE SITTING AND TALKING TO SOMEONE: WOULD NEVER DOZE
HOW LIKELY ARE YOU TO NOD OFF OR FALL ASLEEP WHILE SITTING QUIETLY AFTER LUNCH WITHOUT ALCOHOL: SLIGHT CHANCE OF DOZING
HOW LIKELY ARE YOU TO NOD OFF OR FALL ASLEEP WHILE WATCHING TV: SLIGHT CHANCE OF DOZING
HOW LIKELY ARE YOU TO NOD OFF OR FALL ASLEEP IN A CAR, WHILE STOPPED FOR A FEW MINUTES IN TRAFFIC: WOULD NEVER DOZE
HOW LIKELY ARE YOU TO NOD OFF OR FALL ASLEEP WHILE SITTING AND READING: WOULD NEVER DOZE
HOW LIKELY ARE YOU TO NOD OFF OR FALL ASLEEP WHILE LYING DOWN TO REST IN THE AFTERNOON WHEN CIRCUMSTANCES PERMIT: HIGH CHANCE OF DOZING
HOW LIKELY ARE YOU TO NOD OFF OR FALL ASLEEP WHILE SITTING INACTIVE IN A PUBLIC PLACE: WOULD NEVER DOZE

## 2023-12-18 NOTE — PROGRESS NOTES
Does Hayde have a CPAP/Bipap?  No     [place] Sleep Scale:  7  Stop Bang:3  BMI: 39.48    Hayde is a 53 year old who is being evaluated via a billable video visit.      How would you like to obtain your AVS? MyChart  If the video visit is dropped, the invitation should be resent by: Text to cell phone: [phone]  Will anyone else be joining your video visit? No              Subjective   Hayde is a 53 year old, presenting for the following health issues:  Sleep Problem (Class 2 obesity without serious comorbidity with body mass index (BMI) of 39.0 to 39.9 in adult, unspecified obesity type/Snoring//)          Objective           Vitals:  No vitals were obtained today due to virtual visit.    Physical Exam   GENERAL: Healthy, alert and no distress  EYES: Eyes grossly normal to inspection.  No discharge or erythema, or obvious scleral/conjunctival abnormalities.  RESP: No audible wheeze, cough, or visible cyanosis.  No visible retractions or increased work of breathing.    SKIN: Visible skin clear. No significant rash, abnormal pigmentation or lesions.  NEURO: Cranial nerves grossly intact.  Mentation and speech appropriate for age.  PSYCH: Mentation appears normal, affect normal/bright, judgement and insight intact, normal speech and appearance well-groomed.      Video-Visit Details    Type of service:  Video Visit     Originating Location (pt. Location): Home    Distant Location (provider location):  On-site  Platform used for Video Visit: [facility]     Outpatient Sleep Medicine Consultation:      Name: Hayde Monique MRN# [id number]   Age: 53 year old YOB: 1970     Date of Consultation: December 18, 2023  Consultation is requested by: Kate Gupta, JEANNINE  [address]  [place] [address] Kate Gupta  Primary care provider: Dominik Arizmendi       Reason for Sleep Consult:     Hayde Monique is sent by Kate Gupta for a sleep consultation regarding possible sleep apnea, referred per  bariatric surgery.    Patient s Reason for visit  Hayde Monique main reason for visit: bariatric surgery  Patient states problem(s) started: Not a problem Doc wanted me to check in with you  Hayde Monique's goals for this visit: I sleep very well, need to get up to go to the restroom, I take naps if needed and still fall asleep at night           Assessment and Plan:     Summary Sleep Diagnoses:  Suspected sleep apnea- stop bang 3, occasional snoring.   RLS- she does c/o a numbness at times in the legs and a urge to move them. This occurs sporadically typically while in bed or sitting.     Comorbid Diagnoses:  Depression/anxiety  Obesity      Summary Recommendations:  Lab study for evaluation of possible sleep apnea, restless legs. She reports she would not get good results with a home study as she wakes frequently due to  snoring.   No orders of the defined types were placed in this encounter.        Summary Counseling:    Sleep Testing Reviewed  Obstructive Sleep Apnea Reviewed  Complications of Untreated Sleep Apnea Reviewed      Medical Decision-making:   Educational materials provided in instructions    Total time spent reviewing medical records, history and physical examination, review of previous testing and interpretation as well as documentation on this date:45 min    CC: Kate Gupta          History of Present Illness:     Past Sleep Evaluations:    SLEEP-WAKE SCHEDULE:     Work/School Days: Patient goes to school/work: Yes   Usually gets into bed at 11 pm  Takes patient about an hour to fall asleep  Has trouble falling asleep not really at all nights per week  Wakes up in the middle of the night 2 x's for the restroom times.  Wakes up due to Use the bathroom  She has trouble falling back asleep 1 times a week.   It usually takes 15 min to get back to sleep  Patient is usually up at 7 am  Uses alarm: Yes    Weekends/Non-work Days/All Other Days:  Usually gets into bed at 11 pm   Takes patient about  1 hour to fall asleep  Patient is usually up at 8 am  Uses alarm: No    Sleep Need  Patient gets  7 to 8 hours sleep on average   Patient thinks she needs about 7 to 8 hours sleep    Hayde Monique prefers to sleep in this position(s): Side   Patient states they do the following activities in bed: Use phone, computer, or tablet    Naps  Patient takes a purposeful nap 2 x's times a week and naps are usually 1 hour in duration  She feels better after a nap: Yes  She dozes off unintentionally 0 days per week  Patient has had a driving accident or near-miss due to sleepiness/drowsiness: No      SLEEP DISRUPTIONS:    Breathing/Snoring  Patient snores:No  Other people complain about her snoring: No  Patient has been told she stops breathing in her sleep:No  She has issues with the following: Getting up to urinate more than once    Movement:  Patient gets pain, discomfort, with an urge to move:  No  It happens when she is resting:  No  It happens more at night:  No  Patient has been told she kicks her legs at night:  No     Behaviors in Sleep:  Hayde Monique has experienced the following behaviors while sleeping:    She has experienced sudden muscle weakness during the day: No      Is there anything else you would like your sleep provider to know: I've always gotten up to use the bathroom, I've been to the doctor for it and nothing was wrong        CAFFEINE AND OTHER SUBSTANCES:    Patient consumes caffeinated beverages per day:  2  Last caffeine use is usually: 10 am  List of any prescribed or over the counter stimulants that patient takes: None  List of any prescribed or over the counter sleep medication patient takes: None  List of previous sleep medications that patient has tried: None  Patient drinks alcohol to help them sleep: No  Patient drinks alcohol near bedtime: No    Family History:  Patient has a family member been diagnosed with a sleep disorder: No            SCALES:    EPWORTH SLEEPINESS SCALE          12/15/2023     8:30 AM    Benson Sleepiness Scale ( MARIO Avalos  [id number]<br>ESS - [place]/English - Final version - 21 Nov 07 - [facility].)   Sitting and reading Would never doze   Watching TV Slight chance of dozing   Sitting, inactive in a public place (e.g. a theatre or a meeting) Would never doze   As a passenger in a car for an hour without a break Moderate chance of dozing   Lying down to rest in the afternoon when circumstances permit High chance of dozing   Sitting and talking to someone Would never doze   Sitting quietly after a lunch without alcohol Slight chance of dozing   In a car, while stopped for a few minutes in traffic Would never doze   Benson Score (MC) 7   Benson Score (Sleep) 7         INSOMNIA SEVERITY INDEX (LELAND)          12/15/2023     8:19 AM   Insomnia Severity Index (LELAND)   Difficulty falling asleep 0   Difficulty staying asleep 1   Problems waking up too early 1   How SATISFIED/DISSATISFIED are you with your CURRENT sleep pattern? 0   How NOTICEABLE to others do you think your sleep problem is in terms of impairing the quality of your life? 0   How WORRIED/DISTRESSED are you about your current sleep problem? 0   To what extent do you consider your sleep problem to INTERFERE with your daily functioning (e.g. daytime fatigue, mood, ability to function at work/daily chores, concentration, memory, mood, etc.) CURRENTLY? 0   LELAND Total Score 2       Guidelines for Scoring/Interpretation:  Total score categories:  0-7 = No clinically significant insomnia   8-14 = Subthreshold insomnia   15-21 = Clinical insomnia (moderate severity)  22-28 = Clinical insomnia (severe)  Used via courtesy of www.Reebeeealth.va.gov with permission from Regino Mack PhD., [facility]      STOP BANG         12/15/2023     8:32 AM   STOP BANG Questionnaire (  2008, the American Society of Anesthesiologists, Inc. [employer]   1. Snoring - Do you snore loudly (louder than  "talking or loud enough to be heard through closed doors)? No   2. Tired - Do you often feel tired, fatigued, or sleepy during daytime? No   3. Observed - Has anyone observed you stop breathing during your sleep? No   4. Blood pressure - Do you have or are you being treated for high blood pressure? Yes   5. BMI - BMI more than 35 kg/m2? Yes   6. Age - Age over 50 yr old? Yes   7. Neck circumference - Neck circumference greater than 40 cm? No   8. Gender - Gender male? No   STOP BANG Score (MC): 4 (High risk of DHRUV)         GAD7        5/30/2023    12:44 PM   CARLOS-7    1. Feeling nervous, anxious, or on edge 0   2. Not being able to stop or control worrying 0   3. Worrying too much about different things 0   4. Trouble relaxing 0   5. Being so restless that it is hard to sit still 0   6. Becoming easily annoyed or irritable 0   7. Feeling afraid, as if something awful might happen 0   CARLOS-7 Total Score 0   If you checked any problems, how difficult have they made it for you to do your work, take care of things at home, or get along with other people? Not difficult at all         CAGE-AID         No data to display                  CAGE-AID reprinted with permission from the [place] Medical Journal, BLAYNE Domingo. and NICHOLAS Unger, \"Conjoint screening questionnaires for alcohol and drug abuse\" [place] Medical Journal 94: 135-140, 1995.      PATIENT HEALTH QUESTIONNAIRE-9 (PHQ - 9)        5/30/2023     8:53 AM   PHQ-9 ([employer])   1.  Little interest or pleasure in doing things 0   2.  Feeling down, depressed, or hopeless 0   3.  Trouble falling or staying asleep, or sleeping too much 0   4.  Feeling tired or having little energy 0   5.  Poor appetite or overeating 0   6.  Feeling bad about yourself - or that you are a failure or have let yourself or your family down 0   7.  Trouble concentrating on things, such as reading the newspaper or watching television 0   8.  Moving or speaking so slowly that other people could have " noticed. Or the opposite - being so fidgety or restless that you have been moving around a lot more than usual 0   9.  Thoughts that you would be better off dead, or of hurting yourself in some way 0   PHQ-9 Total Score 0   6.  Feeling bad about yourself 0   7.  Trouble concentrating 0   8.  Moving slowly or restless 0   9.  Suicidal or self-harm thoughts 0   1.  Little interest or pleasure in doing things Not at all   2.  Feeling down, depressed, or hopeless Not at all   3.  Trouble falling or staying asleep, or sleeping too much Not at all   4.  Feeling tired or having little energy Not at all   5.  Poor appetite or overeating Not at all   6.  Feeling bad about yourself Not at all   7.  Trouble concentrating Not at all   8.  Moving slowly or restless Not at all   9.  Suicidal or self-harm thoughts Not at all   PHQ-9 via magnify360hart TOTAL SCORE-----> 0   Difficulty at work, home, or with people Not difficult at all       Developed by Mae Pandey, Anabel Avina, Torres Doran and colleagues, with an educational saul from [employer] No permission required to reproduce, translate, display or distribute.        Allergies:    No Known Allergies    Medications:    Current Outpatient Medications   Medication Sig Dispense Refill    atorvastatin (LIPITOR) 40 MG tablet Take 1 tablet (40 mg) by mouth daily 60 tablet 0    busPIRone (BUSPAR) 10 MG tablet Take 1 tablet (10 mg) by mouth daily 90 tablet 0    insulin pen needle (31G X 5 MM) 31G X 5 MM miscellaneous Use 1 pen needles daily or as directed. 100 each 11    liraglutide - Weight Management (SAXENDA) 18 MG/3ML pen Week 1: 0.6 mg subcutaneous daily, Week 2: 1.2 mg daily, Week 3: 1.8 mg daily, Week 4: 2.4 mg daily, Week 5 & on: 3 mg daily 15 mL 3    omeprazole (PRILOSEC) 20 MG DR capsule Take 1 capsule (20 mg) by mouth daily 90 capsule 2    PARoxetine (PAXIL) 40 MG tablet TAKE ONE TABLET BY MOUTH EVERY DAY 90 tablet 0    Semaglutide-Weight Management (WEGOVY)  0.25 MG/0.5ML pen Inject 0.25 mg Subcutaneous once a week For 4 weeks 2 mL 0    Semaglutide-Weight Management (WEGOVY) 0.5 MG/0.5ML pen Inject 0.5 mg Subcutaneous once a week 2 mL 1    Semaglutide-Weight Management (WEGOVY) 1 MG/0.5ML pen Inject 1 mg Subcutaneous once a week 2 mL 1       Problem List:  Patient Active Problem List    Diagnosis Date Noted    Elevated cholesterol 11/06/2023     Priority: Medium    Obesity, Class III, BMI 40-49.9 (morbid obesity) (H) 08/03/2023     Priority: Medium    Joint pain in both hands 12/05/2022     Priority: Medium    Diverticulosis of large intestine 11/09/2022     Priority: Medium    Right lateral epicondylitis 07/10/2020     Priority: Medium    Acne, unspecified acne type 10/15/2019     Priority: Medium    Right shoulder pain 11/13/2017     Priority: Medium    Class 1 obesity due to excess calories without serious comorbidity with body mass index (BMI) of 32.0 to 32.9 in adult 11/03/2017     Priority: Medium    Family history of breast cancer 03/27/2017     Priority: Medium     Maternal grandmother      Melasma 11/01/2015     Priority: Medium    Generalized anxiety disorder 05/19/2015     Priority: Medium     Diagnosis updated by automated process. Provider to review and confirm.      Major depression 01/16/2014     Priority: Medium    OCD (obsessive compulsive disorder)      Priority: Medium    Anxiety attack      Priority: Medium    Acetaminophen overdose 04/06/2011     Priority: Medium        Past Medical/Surgical History:  Past Medical History:   Diagnosis Date    Anxiety attack     Bone and joint disord back, pelvis, leg complicat preg, childb, puerp 2020    Cancer (H) 2010    Depressive disorder 2000    Dermatofibroma 11/01/2015    Family history of breast cancer     Family history of breast cancer     Fracture 1995    Hearing problem 11/1/1976    LSIL (low grade squamous intraepithelial lesion) on Pap smear 10/2010    1/2012 pap/hpv neg    OCD (obsessive compulsive  disorder)     Right lateral epicondylitis 07/10/2020     Past Surgical History:   Procedure Laterality Date    ABDOMEN SURGERY          ARTHROSCOPY SHOULDER Right 2017    Procedure:  Right shoulder arthroscopy and biceps tenolysis.  Arthroscopic subacromial decompresson (acromioplasty, bursectomy and coracoacromial ligament resection). Arthroscopic distal clavicle resection.  Surgeon:  Luca Rodríguez MD  Location: [facility]    BREAST SURGERY      implants      SECTION      COLONOSCOPY N/A 2021    Procedure: COLONOSCOPY;  Surgeon: Kirt Benitez MD;  Location:  GI    COSMETIC SURGERY      Implants    ENT SURGERY      Tubes    ESOPHAGOSCOPY, GASTROSCOPY, DUODENOSCOPY (EGD), COMBINED N/A 2021    Procedure: ESOPHAGOGASTRODUODENOSCOPY, WITH BIOPSies;  Surgeon: Kirt Benitez MD;  Location:  GI    orif leg Right     right lower leg, rodding    ORTHOPEDIC SURGERY Right     Broken Leg - chin and screws    WRIST SURGERY Left     Left wrist, cyst excision       Social History:  Social History     Socioeconomic History    Marital status:      Spouse name: Not on file    Number of children: Not on file    Years of education: Not on file    Highest education level: Not on file   Occupational History    Not on file   Tobacco Use    Smoking status: Former     Packs/day: 0.00     Years: 1.00     Additional pack years: 0.00     Total pack years: 0.00     Types: Cigarettes    Smokeless tobacco: Never    Tobacco comments:     2021   Vaping Use    Vaping Use: Never used   Substance and Sexual Activity    Alcohol use: Yes     Comment: three times per week about 2 glasses of wine    Drug use: No    Sexual activity: Not Currently     Partners: Male     Birth control/protection: Male Surgical, None   Other Topics Concern    Parent/sibling w/ CABG, MI or angioplasty before 65F 55M? No     Service No    Blood Transfusions No    Caffeine  Concern Yes     Comment: 2 pops a day     Occupational Exposure Not Asked    Hobby Hazards Not Asked    Sleep Concern Not Asked    Stress Concern Not Asked    Weight Concern Not Asked    Special Diet Not Asked    Back Care Not Asked    Exercise Not Asked    Bike Helmet Not Asked    Seat Belt Not Asked    Self-Exams Yes   Social History Narrative    Not on file     Social Determinants of Health     Financial Resource Strain: Low Risk  (10/3/2023)    Financial Resource Strain     Within the past 12 months, have you or your family members you live with been unable to get utilities (heat, electricity) when it was really needed?: No   Food Insecurity: Low Risk  (10/3/2023)    Food Insecurity     Within the past 12 months, did you worry that your food would run out before you got money to buy more?: No     Within the past 12 months, did the food you bought just not last and you didn t have money to get more?: No   Transportation Needs: Low Risk  (10/3/2023)    Transportation Needs     Within the past 12 months, has lack of transportation kept you from medical appointments, getting your medicines, non-medical meetings or appointments, work, or from getting things that you need?: No   Physical Activity: Not on file   Stress: Not on file   Social Connections: Not on file   Interpersonal Safety: Not on file   Housing Stability: Low Risk  (10/3/2023)    Housing Stability     Do you have housing? : Yes     Are you worried about losing your housing?: No       Family History:  Family History   Problem Relation Age of Onset    Cancer Mother 55        Lung cancer    Other Cancer Mother         Lung    Depression Mother     Anxiety Disorder Mother     Hypertension Father     Hyperlipidemia Father     Rheumatoid Arthritis Sister     Other Cancer Sister         Lung    Breast Cancer Maternal Grandmother     Rheumatoid Arthritis Paternal Grandmother     Depression Sister     Anxiety Disorder Sister     Other Cancer Other         Lung  "   Mental Illness Son     Colon Cancer No family hx of        Review of Systems:  A complete review of systems reviewed by me is negative with the exeption of what has been mentioned in the history of present illness.  In the last TWO WEEKS have you experienced any of the following symptoms?  Fevers: No  Night Sweats: No  Weight Gain: No  Pain at Night: No  Double Vision: No  Changes in Vision: No  Difficulty Breathing through Nose: No  Sore Throat in Morning: No  Dry Mouth in the Morning: No  Shortness of Breath Lying Flat: No  Shortness of Breath With Activity: No  Awakening with Shortness of Breath: No  Increased Cough: No  Heart Racing at Night: No  Swelling in Feet or Legs: No  Diarrhea at Night: No  Heartburn at Night: No  Urinating More than Once at Night: Yes  Losing Control of Urine at Night: No  Joint Pains at Night: No  Headaches in Morning: No  Weakness in Arms or Legs: No  Depressed Mood: No  Anxiety: No     Physical Examination:  Vitals: There were no vitals taken for this visit.  BMI= There is no height or weight on file to calculate BMI.              Data: All pertinent previous laboratory data reviewed     Recent Labs   Lab Test 09/26/23  1408 11/08/22  1431    140   POTASSIUM 4.3 4.1   CHLORIDE 107 105   CO2 23 26   ANIONGAP 14 9   GLC 92 89   BUN 10.0 14   CR 0.56 0.65   HOLGER 8.9 9.3       Recent Labs   Lab Test 05/30/23  1336   WBC 7.8   RBC 4.24   HGB 12.6   HCT 38.8   MCV 92   MCH 29.7   MCHC 32.5   RDW 13.1          Recent Labs   Lab Test 09/26/23  1408   PROTTOTAL 6.6   ALBUMIN 4.3   BILITOTAL 0.3   ALKPHOS 86   AST 39   ALT 59*       TSH   Date Value   09/26/2023 2.65 uIU/mL   06/23/2020 3.04 mU/L   06/20/2018 1.56 mU/L       No results found for: \"UAMP\", \"UBARB\", \"BENZODIAZEUR\", \"UCANN\", \"UCOC\", \"OPIT\", \"UPCP\"    Iron Saturation Index   Date/Time Value Ref Range Status   12/21/2016 04:30 PM 11 (L) 15 - 46 % Final     Ferritin   Date/Time Value Ref Range Status   07/22/2020 03:04 " "PM 84 8 - 252 ng/mL Final       No results found for: \"PH\", \"PHARTERIAL\", \"PO2\", \"SE2KFSWTAYG\", \"SAT\", \"PCO2\", \"HCO3\", \"BASEEXCESS\", \"GIANCARLO\", \"BEB\"    @LABRCNTIPR(phv:4,pco2v:4,po2v:4,hco3v:4,toro:4,o2per:4)@    Echocardiology: No results found for this or any previous visit (from the past 4320 hour(s)).    Chest x-ray: No results found for this or any previous visit from the past 365 days.      Chest CT: No results found for this or any previous visit from the past 365 days.      PFT: Most Recent [facility]ze Pulmonary Function Testing    No results found for: \"20001\"  No results found for: \"20002\"  No results found for: \"20003\"  No results found for: \"20015\"  No results found for: \"20016\"  No results found for: \"20027\"  No results found for: \"20028\"  No results found for: \"20029\"  No results found for: \"20079\"  No results found for: \"20080\"  No results found for: \"20081\"  No results found for: \"20335\"  No results found for: \"20105\"  No results found for: \"20053\"  No results found for: \"20054\"  No results found for: \"20055\"      [facility] 12/18/2023           "

## 2023-12-19 ENCOUNTER — VIRTUAL VISIT (OUTPATIENT)
Dept: SLEEP MEDICINE | Facility: CLINIC | Age: 53
End: 2023-12-19
Attending: PHYSICIAN ASSISTANT
Payer: COMMERCIAL

## 2023-12-19 DIAGNOSIS — R06.83 SNORING: ICD-10-CM

## 2023-12-19 DIAGNOSIS — G25.81 RESTLESS LEGS SYNDROME (RLS): ICD-10-CM

## 2023-12-19 DIAGNOSIS — E66.01 OBESITY, CLASS III, BMI 40-49.9 (MORBID OBESITY) (H): ICD-10-CM

## 2023-12-19 DIAGNOSIS — R29.818 SUSPECTED SLEEP APNEA: Primary | ICD-10-CM

## 2023-12-19 PROCEDURE — 99203 OFFICE O/P NEW LOW 30 MIN: CPT | Mod: VID | Performed by: PHYSICIAN ASSISTANT

## 2023-12-19 NOTE — PATIENT INSTRUCTIONS
"          MY TREATMENT INFORMATION FOR SLEEP APNEA-  Hayde Monique    DOCTOR : ELAN Sierra-ALBERTO    Am I having a sleep study at a sleep center?  --->Due to normal delays, you will be contacted within 2-4 weeks to schedule    Am I having a home sleep study?  --->Watch the video for the device you are using:    -/drop off device-   https://www.VEEDIMS.com/watch?v=yGGFBdELGhk    -Disposable device sent out require phone/computer application-   https://www.VEEDIMS.com/watch?v=BCce_vbiwxE      Frequently asked questions:  1. What is Obstructive Sleep Apnea (DHRUV)? DHRUV is the most common type of sleep apnea. Apnea means, \"without breath.\"  Apnea is most often caused by narrowing or collapse of the upper airway as muscles relax during sleep.   Almost everyone has occasional apneas. Most people with sleep apnea have had brief interruptions at night frequently for many years.  The severity of sleep apnea is related to how frequent and severe the events are.   2. What are the consequences of DHRUV? Symptoms include: feeling sleepy during the day, snoring loudly, gasping or stopping of breathing, trouble sleeping, and occasionally morning headaches or heartburn at night.  Sleepiness can be serious and even increase the risk of falling asleep while driving. Other health consequences may include development of high blood pressure and other cardiovascular disease in persons who are susceptible. Untreated DHRUV  can contribute to heart disease, stroke and diabetes.   3. What are the treatment options? In most situations, sleep apnea is a lifelong disease that must be managed with daily therapy. Medications are not effective for sleep apnea and surgery is generally not considered until other therapies have been tried. Your treatment is your choice . Continuous Positive Airway (CPAP) works right away and is the therapy that is effective in nearly everyone. An oral device to hold your jaw forward is usually the next most " reliable option. Other options include postioning devices (to keep you off your back), weight loss, and surgery including a tongue pacing device. There is more detail about some of these options below.  4. Are my sleep studies covered by insurance? Although we will request verification of coverage, we advise you also check in advance of the study to ensure there is coverage.    Important tips for those choosing CPAP and similar devices  For new devices, sign up for device YADIEL to monitor your device for your followup visits  We encourage you to utilize the [employer] yadiel or website (myAir web (resmed.com) ) to monitor your therapy progress and share the data with your healthcare team when you discuss your sleep apnea.                                                    Know your equipment:  CPAP is continuous positive airway pressure that prevents obstructive sleep apnea by keeping the throat from collapsing while you are sleeping. In most cases, the device is  smart  and can slowly self-adjusts if your throat collapses and keeps a record every day of how well you are treated-this information is available to you and your care team.  BPAP is bilevel positive airway pressure that keeps your throat open and also assists each breath with a pressure boost to maintain adequate breathing.  Special kinds of BPAP are used in patients who have inadequate breathing from lung or heart disease. In most cases, the device is  smart  and can slowly self-adjusts to assist breathing. Like CPAP, the device keeps a record of how well you are treated.  Your mask is your connection to the device. You get to choose what feels most comfortable and the staff will help to make sure if fits. Here: are some examples of the different masks that are available: Magnetic mask aids may assist with use but there are safety issues that should be addressed when considering with magnets* ( see end of discussion).       Key points to remember on your  journey with sleep apnea:  Sleep study.  PAP devices often need to be adjusted during a sleep study to show that they are effective and adjusted right.  Good tips to remember: Try wearing just the mask during a quiet time during the day so your body adapts to wearing it. A humidifier is recommended for comfort in most cases to prevent drying of your nose and throat. Allergy medication from your provider may help you if you are having nasal congestion.  Getting settled-in. It takes more than one night for most of us to get used to wearing a mask. Try wearing just the mask during a quiet time during the day so your body adapts to wearing it. A humidifier is recommended for comfort in most cases. Our team will work with you carefully on the first day and will be in contact within 4 days and again at 2 and 4 weeks for advice and remote device adjustments. Your therapy is evaluated by the device each day.   Use it every night. The more you are able to sleep naturally for 7-8 hours, the more likely you will have good sleep and to prevent health risks or symptoms from sleep apnea. Even if you use it 4 hours it helps. Occasionally all of us are unable to use a medical therapy, in sleep apnea, it is not dangerous to miss one night.   Communicate. Call our skilled team on the number provided on the first day if your visit for problems that make it difficult to wear the device. Over 2 out of 3 patients can learn to wear the device long-term with help from our team. Remember to call our team or your sleep providers if you are unable to wear the device as we may have other solutions for those who cannot adapt to mask CPAP therapy. It is recommended that you sleep your sleep provider within the first 3 months and yearly after that if you are not having problems.   Use it for your health. We encourage use of CPAP masks during daytime quiet periods to allow your face and brain to adapt to the sensation of CPAP so that it will be a  more natural sensation to awaken to at night or during naps. This can be very useful during the first few weeks or months of adapting to CPAP though it does not help medically to wear CPAP during wakefulness and  should not be used as a strategy just to meet guidelines.  Take care of your equipment. Make sure you clean your mask and tubing using directions every day and that your filter and mask are replaced as recommended or if they are not working.     *Masks with magnets:  Updated Contraindications  Masks with magnetic components are contraindicated for use by patients where they, or anyone in close physical contact while using the mask, have the following:   Active medical implants that interact with magnets (i.e., pacemakers, implantable cardioverter defibrillators (ICD), neurostimulators, cerebrospinal fluid (CSF) shunts, insulin/infusion pumps)   Metallic implants/objects containing ferromagnetic material (i.e., aneurysm clips/flow disruption devices, embolic coils, stents, valves, electrodes, implants to restore hearing or balance with implanted magnets, ocular implants, metallic splinters in the eye)  Updated Warning  Keep the mask magnets at a safe distance of at least 6 inches (150 mm) away from implants or medical devices that may be adversely affected by magnetic interference. This warning applies to you or anyone in close physical contact with your mask. The magnets are in the frame and lower headgear clips, with a magnetic field strength of up to 400mT. When worn, they connect to secure the mask but may inadvertently detach while asleep.  Implants/medical devices, including those listed within contraindications, may be adversely affected if they change function under external magnetic fields or contain ferromagnetic materials that attract/repel to magnetic fields (some metallic implants, e.g., contact lenses with metal, dental implants, metallic cranial plates, screws, ayleen hole covers, and bone  substitute devices). Consult your physician and  of your implant / other medical device for information on the potential adverse effects of magnetic fields.    BESIDES CPAP, WHAT OTHER THERAPIES ARE THERE?    Positioning Device  Positioning devices are generally used when sleep apnea is mild and only occurs on your back.This example shows a pillow that straps around the waist. It may be appropriate for those whose sleep study shows milder sleep apnea that occurs primarily when lying flat on one's back. Preliminary studies have shown benefit but effectiveness at home may need to be verified by a home sleep test. These devices are generally not covered by medical insurance.  Examples of devices that maintain sleeping on the back to prevent snoring and mild sleep apnea.    Belt type body positioner  http://Myfacepage/    Electronic reminder  http://nightshifttherapy.com/            Oral Appliance  What is oral appliance therapy?  An oral appliance device fits on your teeth at night like a retainer used after having braces. The device is made by a specialized dentist and requires several visits over 1-2 months before a manufactured device is made to fit your teeth and is adjusted to prevent your sleep apnea. Once an oral device is working properly, snoring should be improved. A home sleep test may be recommended at that time if to determine whether the sleep apnea is adequately treated.       Some things to remember:  -Oral devices are often, but not always, covered by your medical insurance. Be sure to check with your insurance provider.   -If you are referred for oral therapy, you will be given a list of specialized dentists to consider or you may choose to visit the Web site of the American Academy of Dental Sleep Medicine  -Oral devices are less likely to work if you have severe sleep apnea or are extremely overweight.     More detailed information  An oral appliance is a small acrylic device that fits  over the upper and lower teeth  (similar to a retainer or a mouth guard). This device slightly moves jaw forward, which moves the base of the tongue forward, opens the airway, improves breathing for effective treat snoring and obstructive sleep apnea in perhaps 7 out of 10 people .  The best working devices are custom-made by a dental device  after a mold is made of the teeth 1, 2, 3.  When is an oral appliance indicated?  Oral appliance therapy is recommended as a first-line treatment for patients with primary snoring, mild sleep apnea, and for patients with moderate sleep apnea who prefer appliance therapy to use of CPAP4, 5. Severity of sleep apnea is determined by sleep testing and is based on the number of respiratory events per hour of sleep.   How successful is oral appliance therapy?  The success rate of oral appliance therapy in patients with mild sleep apnea is 75-80% while in patients with moderate sleep apnea it is 50-70%. The chance of success in patients with severe sleep apnea is 40-50%. The research also shows that oral appliances have a beneficial effect on the cardiovascular health of DHRUV patients at the same magnitude as CPAP therapy7.  Oral appliances should be a second-line treatment in cases of severe sleep apnea, but if not completely successful then a combination therapy utilizing CPAP plus oral appliance therapy may be effective. Oral appliances tend to be effective in a broad range of patients although studies show that the patients who have the highest success are females, younger patients, those with milder disease, and less severe obesity. 3, 6.   Finding a dentist that practices dental sleep medicine  Specific training is available through the American Academy of Dental Sleep Medicine for dentists interested in working in the field of sleep. To find a dentist who is educated in the field of sleep and the use of oral appliances, near you, visit the Web site of the American  Academy of Dental Sleep Medicine.    References  1. Saroj et al. Objectively measured vs self-reported compliance during oral appliance therapy for sleep-disordered breathing. Chest 2013; 144(5): 4618-6603.  2. Marie et al. Objective measurement of compliance during oral appliance therapy for sleep-disordered breathing. Thorax 2013; 68(1): 91-96.  3. Gume, et al. Mandibular advancement devices in 620 men and women with DHRUV and snoring: tolerability and predictors of treatment success. Chest 2004; 125: 8762-5280.  4. Adriana et al. Oral appliances for snoring and DHRUV: a review. Sleep 2006; 29: 244-262.  5. Sharmila et al. Oral appliance treatment for DHRUV: an update. J Clin Sleep Med 2014; 10(2): 215-227.  6. Daksha et al. Predictors of OSAH treatment outcome. J Dent Res 2007; 86: 5204-6870.      Weight Loss:    Weight loss is a long-term strategy that may improve sleep apnea in some patients.    Weight management is a personal decision and the decision should be based on your interest and the potential benefits.  If you are interested in exploring weight loss strategies, the following discussion covers the impact on weight loss on sleep apnea and the approaches that may be successful.    Being overweight does not necessarily mean you will have health consequences.  Those who have BMI over 35 or over 27 with existing medical conditions carries greater risk.   Weight loss decreases severity of sleep apnea in most people with obesity. For those with mild obesity who have developed snoring with weight gain, even 15-30 pound weight loss can improve and occasionally eliminate sleep apnea.  Structured and life-long dietary and health habits are necessary to lose weight and keep healthier weight levels.     Though there may be significant health benefits from weight loss, long-term weight loss is very difficult to achieve- studies show success with dietary management in less than 10% of people. In  addition, substantial weight loss may require years of dietary control and may be difficult if patients have severe obesity. In these cases, surgical management may be considered.  Finally, older individuals who have tolerated obesity without health complications may be less likely to benefit from weight loss strategies.      [unfilled]    Surgery:    Surgery for obstructive sleep apnea is considered generally only when other therapies fail to work. Surgery may be discussed with you if you are having a difficult time tolerating CPAP and or when there is an abnormal structure that requires surgical correction.  Nose and throat surgeries often enlarge the airway to prevent collapse.  Most of these surgeries create pain for 1-2 weeks and up to half of the most common surgeries are not effective throughout life.  You should carefully discuss the benefits and drawbacks to surgery with your sleep provider and surgeon to determine if it is the best solution for you.   More information  Surgery for DHRUV is directed at areas that are responsible for narrowing or complete obstruction of the airway during sleep.  There are a wide range of procedures available to enlarge and/or stabilize the airway to prevent blockage of breathing in the three major areas where it can occur: the palate, tongue, and nasal regions.  Successful surgical treatment depends on the accurate identification of the factors responsible for obstructive sleep apnea in each person.  A personalized approach is required because there is no single treatment that works well for everyone.  Because of anatomic variation, consultation with an examination by a sleep surgeon is a critical first step in determining what surgical options are best for each patient.  In some cases, examination during sedation may be recommended in order to guide the selection of procedures.  Patients will be counseled about risks and benefits as well as the typical recovery course after  surgery. Surgery is typically not a cure for a person s DHRUV.  However, surgery will often significantly improve one s DHRUV severity (termed  success rate ).  Even in the absence of a cure, surgery will decrease the cardiovascular risk associated with OSA7; improve overall quality of life8 (sleepiness, functionality, sleep quality, etc).      Palate Procedures:  Patients with DHRUV often have narrowing of their airway in the region of their tonsils and uvula.  The goals of palate procedures are to widen the airway in this region as well as to help the tissues resist collapse.  Modern palate procedure techniques focus on tissue conservation and soft tissue rearrangement, rather than tissue removal.  Often the uvula is preserved in this procedure. Residual sleep apnea is common in patient after pharyngoplasty with an average reduction in sleep apnea events of 33%2.      Tongue Procedures:  ExamWhile patients are awake, the muscles that surround the throat are active and keep this region open for breathing. These muscles relax during sleep, allowing the tongue and other structures to collapse and block breathing.  There are several different tongue procedures available.  Selection of a tongue base procedure depends on characteristics seen on physical exam.  Generally, procedures are aimed at removing bulky tissues in this area or preventing the back of the tongue from falling back during sleep.  Success rates for tongue surgery range from 50-62%3.    Hypoglossal Nerve Stimulation:  Hypoglossal nerve stimulation has recently received approval from the [place] Food and Drug Administration for the treatment of obstructive sleep apnea.  This is based on research showing that the system was safe and effective in treating sleep apnea6.  Results showed that the median AHI score decreased 68%, from 29.3 to 9.0. This therapy uses an implant system that senses breathing patterns and delivers mild stimulation to airway muscles,  which keeps the airway open during sleep.  The system consists of three fully implanted components: a small generator (similar in size to a pacemaker), a breathing sensor, and a stimulation lead.  Using a small handheld remote, a patient turns the therapy on before bed and off upon awakening.    Candidates for this device must be greater than 18 years of age, have moderate to severe DHRUV (AHI between 15-65), BMI less than 35, have tried CPAP/oral appliance for at least 8 weeks without success, and have appropriate upper airway anatomy (determined by a sleep endoscopy performed by Dr. Cruz Aguirre).    Hypoglossal Nerve Stimulation Pathway:    The sleep surgeon s office will work with the patient through the insurance prior-authorization process (including communications and appeals).    Nasal Procedures:  Nasal obstruction can interfere with nasal breathing during the day and night.  Studies have shown that relief of nasal obstruction can improve the ability of some patients to tolerate positive airway pressure therapy for obstructive sleep apnea1.  Treatment options include medications such as nasal saline, topical corticosteroid and antihistamine sprays, and oral medications such as antihistamines or decongestants. Non-surgical treatments can include external nasal dilators for selected patients. If these are not successful by themselves, surgery can improve the nasal airway either alone or in combination with these other options.      Combination Procedures:  Combination of surgical procedures and other treatments may be recommended, particularly if patients have more than one area of narrowing or persistent positional disease.  The success rate of combination surgery ranges from 66-80%2,3.    References  Jessica DOMINGUEZ. The Role of the Nose in Snoring and Obstructive Sleep Apnoea: An Update.  Eur Arch Otorhinolaryngol. 2011; 268: 1365-73.   Karan SM; Luis JA; Lance THOMAS; Pallanch JF; Sofia TREVINO; Tri TINEO; Derek  YARON. Surgical modifications of the upper airway for obstructive sleep apnea in adults: a systematic review and meta-analysis. SLEEP 2010;33(10):1036-1758. Ashu BHATTI. Hypopharyngeal surgery in obstructive sleep apnea: an evidence-based medicine review.  Arch Otolaryngol Head Neck Surg. 2006 Feb;132(2):206-13.  Dhaval YH1, Georgia Y, Tony GONZALEZ. The efficacy of anatomically based multilevel surgery for obstructive sleep apnea. Otolaryngol Head Neck Surg. 2003 Oct;129(4):327-35.  Kezirian E, Goldberg A. Hypopharyngeal Surgery in Obstructive Sleep Apnea: An Evidence-Based Medicine Review. Arch Otolaryngol Head Neck Surg. 2006 Feb;132(2):206-13.  Armaan CHAIDEZ et al. Upper-Airway Stimulation for Obstructive Sleep Apnea.  N Engl J Med. 2014 Jan 9;370(2):139-49.  Jalen Y et al. Increased Incidence of Cardiovascular Disease in Middle-aged Men with Obstructive Sleep Apnea. Am J Respir Crit Care Med; 2002 166: 159-165  Munoz EM et al. Studying Life Effects and Effectiveness of Palatopharyngoplasty (SLEEP) study: Subjective Outcomes of Isolated Uvulopalatopharyngoplasty. Otolaryngol Head Neck Surg. 2011; 144: 623-631.        WHAT IF I ONLY HAVE SNORING?    Mandibular advancement devices, lateral sleep positioning, long-term weight loss and treatment of nasal allergies have been shown to improve snoring.  Exercising tongue muscles with a game (https://Pogojo.SportsPursuit/us/yadiel/soundly-reduce-snoring/sw1860416725) or stimulating the tongue during the day with a device (https://doi.org/10.3390/wnv33178374) have improved snoring in some individuals.    Remember to Drive Safe... Drive Alive     Sleep health profoundly affects your health, mood, and your safety.  Thirty three percent of the population (one in three of us) is not getting enough sleep and many have a sleep disorder. Not getting enough sleep or having an untreated / undertreated sleep condition may make us sleepy without even knowing it. In fact, our driving could be dramatically  impaired due to our sleep health. As your provider, here are some things I would like you to know about driving:     Here are some warning signs for impairment and dangerous drowsy driving:              -Having been awake more than 16 hours               -Looking tired               -Eyelid drooping              -Head nodding (it could be too late at this point)              -Driving for more than 30 minutes     Some things you could do to make the driving safer if you are experiencing some drowsiness:              -Stop driving and rest              -Call for transportation              -Make sure your sleep disorder is adequately treated     Some things that have been shown NOT to work when experiencing drowsiness while driving:              -Turning on the radio              -Opening windows              -Eating any  distracting  /  entertaining  foods (e.g., sunflower seeds, candy, or any other)              -Talking on the phone      Your decision may not only impact your life, but also the life of others. Please, remember to drive safe for yourself and all of us.

## 2023-12-20 ENCOUNTER — MYC MEDICAL ADVICE (OUTPATIENT)
Dept: SURGERY | Facility: CLINIC | Age: 53
End: 2023-12-20
Payer: COMMERCIAL

## 2023-12-21 RX ORDER — SEMAGLUTIDE 1 MG/.5ML
1 INJECTION, SOLUTION SUBCUTANEOUS WEEKLY
Qty: 2 ML | Refills: 1 | OUTPATIENT
Start: 2023-12-21

## 2023-12-21 RX ORDER — SEMAGLUTIDE 0.25 MG/.5ML
0.25 INJECTION, SOLUTION SUBCUTANEOUS WEEKLY
Qty: 2 ML | Refills: 0 | OUTPATIENT
Start: 2023-12-21

## 2023-12-21 RX ORDER — SEMAGLUTIDE 0.5 MG/.5ML
0.5 INJECTION, SOLUTION SUBCUTANEOUS WEEKLY
Qty: 2 ML | Refills: 1 | OUTPATIENT
Start: 2023-12-21

## 2023-12-21 NOTE — TELEPHONE ENCOUNTER
Saxenda cancelled per pt request.  Has the Wegovy and can't get more if has Saxenda on file.  This request was done per clinic protocol.  Blanca Schmidt MS, RD, RN

## 2023-12-27 ENCOUNTER — MYC REFILL (OUTPATIENT)
Dept: SURGERY | Facility: CLINIC | Age: 53
End: 2023-12-27
Payer: COMMERCIAL

## 2023-12-27 DIAGNOSIS — E66.01 OBESITY, CLASS III, BMI 40-49.9 (MORBID OBESITY) (H): ICD-10-CM

## 2023-12-28 RX ORDER — SEMAGLUTIDE 1 MG/.5ML
1 INJECTION, SOLUTION SUBCUTANEOUS WEEKLY
Qty: 2 ML | Refills: 1 | OUTPATIENT
Start: 2023-12-28

## 2024-01-08 VITALS — HEIGHT: 64 IN | WEIGHT: 224 LBS | BODY MASS INDEX: 38.24 KG/M2

## 2024-01-08 NOTE — PROGRESS NOTES
"Hayde is a 53 year old who is being evaluated via a billable video visit.      The patient has been notified of following:     \"This video visit will be conducted via a call between you and your physician/provider. We have found that certain health care needs can be provided without the need for an in-person physical exam.  This service lets us provide the care you need with a video conversation.  If a prescription is necessary we can send it directly to your pharmacy.  If lab work is needed we can place an order for that and you can then stop by our lab to have the test done at a later time.    Video visits are billed at different rates depending on your insurance coverage.  Please reach out to your insurance provider with any questions.    If during the course of the call the physician/provider feels a video visit is not appropriate, you will not be charged for this service.\"    Patient has given verbal consent for Video visit? Yes    How would you like to obtain your AVS? MyChart    If the video visit is dropped, the invitation should be resent by: Other e-mail: "[employer]"    Will anyone else be joining your video visit? No    I    Video-Visit Details    Type of service:  Video Visit    Video Start Time: 1:54 PM    Video End Time:2:09 PM    Originating Location (pt. Location): in [place] Home    Distant Location (provider location):   [facility]    Platform used for Video Visit: [employer]    2024    Return Medical Weight Management Note     Hayde Monique  MRN:  [id number]  :  1970    Assessment & Plan   Problem List Items Addressed This Visit       Obesity, Class III, BMI 40-49.9 (morbid obesity) (H) - Primary    Relevant Medications    Liraglutide -Weight Management (SAXENDA SC)    insulin pen needle (31G X 5 MM) 31G X 5 MM miscellaneous    liraglutide - Weight Management (SAXENDA) 18 MG/3ML pen    Other Relevant Orders    Basic metabolic panel        AOM " Considerations:  Phentermine:   SSRI consideration with paroxetine and buspirone  Topiramate: Birth control: PostmenopausalCNS depression with paroxetine  GLP-1: Monitor blood sugars, Wegovy and Saxenda appear covered   Naltrexone: No drug interactions seen             Wellbutrin: Monitor response to paroxetine  Metformin: Monitor blood sugars          Contrave:  Qsymia:                                            PATIENT INSTRUCTIONS:  Plan:  Labs ordered.  Please call [phone] to set up a lab appt. Send a [employer] message to your regular clinic for the fasting labs to be ordered as well and then do the lab draws together.   Continue 2.4 mg Saxenda for now and as you're able to get refills. If on shortage then switch to the 1.0 mg Wegovy once weekly that you have on hand. If you have to switch to Wegovy - send me a [employer] message so I can send in 1.7 mg Wegovy.     Goals:  Focus on healthy food choices - prioritizing protein and veggies in your meals. I recommend eating every 4-6 hours to reduce the risk of low blood sugars and keep your metabolism up during the day.  Great job with exercising - please continue to invest in yourself with regular exercise. Continue to strive for a range for 150-300 minutes of exercise for weight maintenance and incorporating strength training twice a week to help preserve muscle! Great job working on swimming and continue to prioritize exercising.  Schedule with the dietitians for protein shake recommendations.     FOLLOW-UP:    Please call [phone] to schedule your next visit in 3 months .     30 minutes spent on the date of the encounter doing chart review, history and exam, result review, counseling, developing plan of care, documentation, and further activities as noted      INTERVAL HX: Hayde returns for medical weight management follow up.  Last seen on 11/6/23 and at that time was planning to try for Saxenda and continue working on the surgical process.  While  "titrating up on Saxenda we were going to look at switching to Wegovy.    Saxenda - 1 box not opened 18MG/SML. I still have a pen of Saxenda that is not used.  I'm on 2.4, and it's going great.  No side effects, and I'm at 224 today.  I stopped for one week over Rhonda and started back up.      Taking magnesium to help with bloating otherwise no other side effects. Has been helpful and working      I also have - not started -   Wegovy 1 box of 0.5 MG  Wegovy 3 boxes of 1 MG       WEIGHT METRICS:  Body mass index is 38.45 kg/m .   Current Weight: 224 lb (101.6 kg)  Last Visits Weight: 230 lb (104.3 kg)  Initial Weight (lbs): 234.8 lbs  Cumulative weight loss (lbs): 10.8  Weight Loss Percentage: 4.6%    Wt Readings from Last 10 Encounters:   01/08/24 224 lb (101.6 kg)   11/06/23 230 lb (104.3 kg)   10/05/23 232 lb (105.2 kg)   08/04/23 234 lb 12.8 oz (106.5 kg)   08/03/23 234 lb 12.8 oz (106.5 kg)   06/22/23 233 lb (105.7 kg)   05/30/23 229 lb (103.9 kg)   02/21/23 225 lb (102.1 kg)   12/20/22 223 lb (101.2 kg)   11/08/22 223 lb (101.2 kg)             Doing lap swimming for exercise at the gym.    Using Atkins protein shake in morning protein.     LABS:  Reviewed in Epic    Repeat BMP - reviewed results from 9/23    BP Readings from Last 6 Encounters:   10/05/23 118/78   08/03/23 138/82   06/22/23 130/88   05/30/23 134/80   05/03/23 136/85   02/21/23 124/80       Pulse Readings from Last 6 Encounters:   10/05/23 75   08/03/23 83   05/30/23 99   05/03/23 86   01/11/23 70   11/08/22 110        PE:  Ht 5' 4\" (1.626 m)   Wt 224 lb (101.6 kg)   BMI 38.45 kg/m    GENERAL: Healthy, alert and no distress  EYES: Eyes grossly normal to inspection.    RESP: No audible wheeze, cough, or visible cyanosis.  No increased work of breathing.    SKIN: Visible skin clear. No significant rash, abnormal pigmentation or lesions.  NEURO: Mentation and speech appropriate for age.  PSYCH: Mentation appears normal, affect normal/bright, " judgement and insight intact, normal speech and appearance well-groomed.      Sincerely,      Kate Gupta PA-C

## 2024-01-09 ENCOUNTER — MYC MEDICAL ADVICE (OUTPATIENT)
Dept: FAMILY MEDICINE | Facility: CLINIC | Age: 54
End: 2024-01-09

## 2024-01-09 ENCOUNTER — VIRTUAL VISIT (OUTPATIENT)
Dept: SURGERY | Facility: CLINIC | Age: 54
End: 2024-01-09
Payer: COMMERCIAL

## 2024-01-09 DIAGNOSIS — E66.01 OBESITY, CLASS III, BMI 40-49.9 (MORBID OBESITY) (H): ICD-10-CM

## 2024-01-09 DIAGNOSIS — E78.00 ELEVATED CHOLESTEROL: ICD-10-CM

## 2024-01-09 DIAGNOSIS — E66.01 CLASS 2 SEVERE OBESITY DUE TO EXCESS CALORIES WITH SERIOUS COMORBIDITY AND BODY MASS INDEX (BMI) OF 38.0 TO 38.9 IN ADULT (H): Primary | ICD-10-CM

## 2024-01-09 DIAGNOSIS — E66.812 CLASS 2 SEVERE OBESITY DUE TO EXCESS CALORIES WITH SERIOUS COMORBIDITY AND BODY MASS INDEX (BMI) OF 38.0 TO 38.9 IN ADULT (H): Primary | ICD-10-CM

## 2024-01-09 PROCEDURE — 99214 OFFICE O/P EST MOD 30 MIN: CPT | Mod: 95 | Performed by: PHYSICIAN ASSISTANT

## 2024-01-09 NOTE — PATIENT INSTRUCTIONS
"To ensure quality you may receive a patient satisfaction survey. The greatest compliment you can give is \"Likely to Recommend.\"    Nice to talk with you today.  Thank you for your trust. Below is the plan discussed.-  Kate Gupta PA-C     Plan:  Labs ordered.  Please call [phone] to set up a lab appt. Send a [employer] message to your regular clinic for the fasting labs to be ordered as well and then do the lab draws together.   Continue 2.4 mg Saxenda for now and as you're able to get refills. If on shortage then switch to the 1.0 mg Wegovy once weekly that you have on hand. If you have to switch to Wegovy - send me a [employer] message so I can send in 1.7 mg Wegovy.     Goals:  Focus on healthy food choices - prioritizing protein and veggies in your meals. I recommend eating every 4-6 hours to reduce the risk of low blood sugars and keep your metabolism up during the day.  Great job with exercising - please continue to invest in yourself with regular exercise. Continue to strive for a range for 150-300 minutes of exercise for weight maintenance and incorporating strength training twice a week to help preserve muscle! Great job working on swimming and continue to prioritize exercising.  Schedule with the dietitians for protein shake recommendations.     FOLLOW-UP:    Please call [phone] to schedule your next visit in 3 months and can schedule with the dietitians at the same number.                           "

## 2024-01-09 NOTE — ASSESSMENT & PLAN NOTE
Patient was congratulated on wt loss success thus far. Healthy habits to assist with further weight loss were discussed. Hayde will continue 2.4 mg Saxenda - can switch to 1.0 mg Wegovy that she has on hand as needed

## 2024-01-10 NOTE — TELEPHONE ENCOUNTER
My chart message sent     Sofia Morrison RN, BSN  [facility]

## 2024-01-18 ENCOUNTER — TELEPHONE (OUTPATIENT)
Dept: FAMILY MEDICINE | Facility: CLINIC | Age: 54
End: 2024-01-18

## 2024-01-18 ENCOUNTER — LAB (OUTPATIENT)
Dept: LAB | Facility: CLINIC | Age: 54
End: 2024-01-18
Payer: COMMERCIAL

## 2024-01-18 DIAGNOSIS — E78.5 HYPERLIPIDEMIA WITH TARGET LDL LESS THAN 100: ICD-10-CM

## 2024-01-18 DIAGNOSIS — E78.5 HYPERLIPIDEMIA WITH TARGET LDL LESS THAN 100: Primary | ICD-10-CM

## 2024-01-18 DIAGNOSIS — E66.01 OBESITY, CLASS III, BMI 40-49.9 (MORBID OBESITY) (H): ICD-10-CM

## 2024-01-18 PROCEDURE — 36415 COLL VENOUS BLD VENIPUNCTURE: CPT

## 2024-01-18 PROCEDURE — 80061 LIPID PANEL: CPT

## 2024-01-18 PROCEDURE — 80048 BASIC METABOLIC PNL TOTAL CA: CPT

## 2024-01-18 NOTE — TELEPHONE ENCOUNTER
[facility] lab calling patient is there but no orders from primary.     Patient is stating she needs labs for refills.   Basic metabolic per Kate Gupta is the only orders     12/15/23- mychart refill encounter   Other meds did not pass due to PHQ9     What labs do you advise? - sent provider epic message   Statin does not pass- last cholesterol was 11/22    Cholesterol   Date Value Ref Range Status   11/08/2022 231 (H) <200 mg/dL Final   03/01/2019 316 (H) <200 mg/dL Final     Comment:     Desirable:       <200 mg/dl     Last physical 2022    Advised will call back. Advised if patient would like have ordered lab drawn and can make another lab appointment if provider orders additional labs.   [phone]

## 2024-01-18 NOTE — TELEPHONE ENCOUNTER
Called [facility] lab to inform the lipid was added onto today's lab already drawn     Lab states the tube was already sent out the other lab.   The tech states other lab will get the pending notification and add lipid onto the BMP       Call to inform patient.

## 2024-01-19 LAB
ANION GAP SERPL CALCULATED.3IONS-SCNC: 12 MMOL/L (ref 7–15)
BUN SERPL-MCNC: 12.4 MG/DL (ref 6–20)
CALCIUM SERPL-MCNC: 9.4 MG/DL (ref 8.6–10)
CHLORIDE SERPL-SCNC: 104 MMOL/L (ref 98–107)
CHOLEST SERPL-MCNC: 177 MG/DL
CREAT SERPL-MCNC: 0.68 MG/DL (ref 0.51–0.95)
DEPRECATED HCO3 PLAS-SCNC: 26 MMOL/L (ref 22–29)
EGFRCR SERPLBLD CKD-EPI 2021: >90 ML/MIN/1.73M2
GLUCOSE SERPL-MCNC: 83 MG/DL (ref 70–99)
HDLC SERPL-MCNC: 45 MG/DL
LDLC SERPL CALC-MCNC: 106 MG/DL
NONHDLC SERPL-MCNC: 132 MG/DL
POTASSIUM SERPL-SCNC: 4.5 MMOL/L (ref 3.4–5.3)
SODIUM SERPL-SCNC: 142 MMOL/L (ref 135–145)
TRIGL SERPL-MCNC: 132 MG/DL

## 2024-01-25 ENCOUNTER — TELEPHONE (OUTPATIENT)
Dept: SURGERY | Facility: CLINIC | Age: 54
End: 2024-01-25
Payer: COMMERCIAL

## 2024-01-25 DIAGNOSIS — E66.812 CLASS 2 SEVERE OBESITY DUE TO EXCESS CALORIES WITH SERIOUS COMORBIDITY AND BODY MASS INDEX (BMI) OF 38.0 TO 38.9 IN ADULT (H): Primary | ICD-10-CM

## 2024-01-25 DIAGNOSIS — E66.01 CLASS 2 SEVERE OBESITY DUE TO EXCESS CALORIES WITH SERIOUS COMORBIDITY AND BODY MASS INDEX (BMI) OF 38.0 TO 38.9 IN ADULT (H): Primary | ICD-10-CM

## 2024-01-25 RX ORDER — SEMAGLUTIDE 1.7 MG/.75ML
1.7 INJECTION, SOLUTION SUBCUTANEOUS WEEKLY
Qty: 3 ML | Refills: 3 | Status: SHIPPED | OUTPATIENT
Start: 2024-01-25 | End: 2024-04-18

## 2024-01-25 NOTE — TELEPHONE ENCOUNTER
"Prior Authorization Retail Medication Request    Medication/Dose:   ICD code (if different than what is on RX):     Disp Refills Start End DAW   Semaglutide-Weight Management (WEGOVY) 1.7 MG/0.75ML pen 3 mL 3 1/25/2024 -- --   Sig - Route: Inject 1.7 mg Subcutaneous once a week     Previously Tried and Failed:  diet and lifestyle  Rationale:  Saxenda national shortage    Hx of obesity  1/9/24 Body mass index is 38.45 kg/m .   1/9/24 Current Weight:  224 lb (101.6 kg) and Ht 5'4\"    Start Saxenda Initial Weight (lbs): 230 lbs Date: 11/6/23  Initial BMI: 39.48 Date: 11/6/23    Cumulative weight loss (lbs): 6  Weight Loss Percentage: 2.6%    Insurance Name:  [employer] OPEN ACCESS   Insurance ID:  [id number]       Pharmacy Information (if different than what is on RX)  Name:  [facility] - [place], [place] - [facility] [address]   Phone:  [phone]     "

## 2024-02-07 NOTE — TELEPHONE ENCOUNTER
PRIOR AUTHORIZATION DENIED    Medication: WEGOVY 1.7 MG/0.75ML SC SOAJ  Insurance Company: [employer] - Phone [phone] Fax [phone]  Denial Date: 2/7/2024  Denial Reason(s):           Appeal Information:         Patient Notified: No

## 2024-02-07 NOTE — TELEPHONE ENCOUNTER
Retail Pharmacy Prior Authorization Team   Phone: [phone]    PA Initiation    Medication: WEGOVY 1.7 MG/0.75ML SC SOAJ  Insurance Company: [employer] - Phone [phone] Fax [phone]  Pharmacy Filling the Rx: [facility] - [place], [place] - [address]  Filling Pharmacy Phone: [phone]  Filling Pharmacy Fax:    Start Date: 2/7/2024

## 2024-02-08 ENCOUNTER — TELEPHONE (OUTPATIENT)
Dept: SURGERY | Facility: CLINIC | Age: 54
End: 2024-02-08
Payer: COMMERCIAL

## 2024-02-09 NOTE — PROGRESS NOTES
"Hayde is a 53 year old who is being evaluated via a billable video visit.      The patient has been notified of following:     \"This video visit will be conducted via a call between you and your physician/provider. We have found that certain health care needs can be provided without the need for an in-person physical exam.  This service lets us provide the care you need with a video conversation.  If a prescription is necessary we can send it directly to your pharmacy.  If lab work is needed we can place an order for that and you can then stop by our lab to have the test done at a later time.    Video visits are billed at different rates depending on your insurance coverage.  Please reach out to your insurance provider with any questions.    If during the course of the call the physician/provider feels a video visit is not appropriate, you will not be charged for this service.\"    Patient has given verbal consent for Video visit? Yes    How would you like to obtain your AVS? MyChart    If the video visit is dropped, the invitation should be resent by: Text to cell phone: [phone]    Will anyone else be joining your video visit? No    I    Video-Visit Details    Type of service:  Video Visit    Originating Location (pt. Location): Home    Distant Location (provider location):   Off-Site - Provider Home Office    Platform used for Video Visit: [employer]    Video Start Time:  1:00pm    Video End Time: 1:19pm    MEDICAL WEIGHT LOSS FOLLOW UP    Reason for visit: medical weight loss     DIAGNOSIS:  Class II Obesity    ANTHROPOMETRICS:  Initial Weight: 234.5 lbs pounds  Current Weight:  221 lbs 0 oz  BMI: 37.93 kg/m2    Weight Loss Medications:   Wegovy    NUTRITION HISTORY:  Breakfast: [wakes at 8am, eats right away] Atkins protein drink in coffee  Lunch: [2-3pm] salad (greens, mixed vegetables, vinaigrette or Ranch, turkey)  soup (vegetable, cabbage, vegetable beef) + fruit  sandwich (turkey, cheese, wheat bread, " light hamilton) + fruit  Dinner: skips  Snacks: fruit throughout the day  Beverage choices: water (48oz+), coffee, protein drink  Eating behaviors: denies stress/boredom/emotional eating    Dining Out:  How often do you dine out: 3x/week  What types of food do you order: brisket sandwich + fries  soup       Exercise:  Type: walking  Duration: 45 minutes  Frequency: 3x/week    Additional Information: Pt seen for initial surgical evaluation last summer but has been lost to follow up since. She has decided not to pursue surgery, as she has had some modest success with GLP-1a use. Appropriate questions today on macros. Reviewed rationale for eating 3 meals/day.      EVALUATION/PROGRESS TOWARDS GOALS:  Previous Goals:   Begin taking multivitamin, calcium and vitamin D per guidelines - not needed  Slowly reduce caffeine intake - met  Eat first meal within 1h of waking - met    Previous Nutrition Diagnosis:  Obese class III related to excess energy intake and self-monitoring deficit as evidenced by BMI of 40.3 kg/m2.  No change, modified below     Current Nutrition Diagnosis:   Obese class II related to excess energy intake and self-monitoring deficit as evidenced by BMI of 37.93 kg/m2.    INTERVENTION:    Nutrition Prescription:  Recommend modified nutrient intake by decreasing energy intake.    Implementation:    Nutrition Education (Content):  Discussed previous goals and determined new goals  Encourage physical activity  Supported patient in attempted weight loss and behavior changes  Patient verbalizes understanding of diet by stating she will eat 3 meals/day  Anticipate fair-good compliance    Goals:  Eat 3 meals/day  Aim for 60-90g protein  Aim for 2885-3714 kcals  Aim for 25-35g fiber    Follow Up/Monitoring:  Other  -  patient to follow up in 8-12 weeks    Time Spent With Patient:  19 Minutes     Mirella Santana RD, LD  Clinical Dietitian   [facility]      [facility]      Physical Exam

## 2024-02-10 ENCOUNTER — HEALTH MAINTENANCE LETTER (OUTPATIENT)
Age: 54
End: 2024-02-10

## 2024-02-12 ENCOUNTER — VIRTUAL VISIT (OUTPATIENT)
Dept: SURGERY | Facility: CLINIC | Age: 54
End: 2024-02-12
Payer: COMMERCIAL

## 2024-02-12 VITALS — HEIGHT: 64 IN | BODY MASS INDEX: 37.73 KG/M2 | WEIGHT: 221 LBS

## 2024-02-12 DIAGNOSIS — E66.01 CLASS 2 SEVERE OBESITY DUE TO EXCESS CALORIES WITH SERIOUS COMORBIDITY AND BODY MASS INDEX (BMI) OF 38.0 TO 38.9 IN ADULT (H): Primary | ICD-10-CM

## 2024-02-12 DIAGNOSIS — F41.1 GENERALIZED ANXIETY DISORDER: ICD-10-CM

## 2024-02-12 DIAGNOSIS — E66.812 CLASS 2 SEVERE OBESITY DUE TO EXCESS CALORIES WITH SERIOUS COMORBIDITY AND BODY MASS INDEX (BMI) OF 38.0 TO 38.9 IN ADULT (H): Primary | ICD-10-CM

## 2024-02-12 DIAGNOSIS — F33.2 MAJOR DEPRESSIVE DISORDER, RECURRENT, SEVERE WITHOUT PSYCHOTIC FEATURES (H): ICD-10-CM

## 2024-02-12 PROCEDURE — 97803 MED NUTRITION INDIV SUBSEQ: CPT | Mod: 95

## 2024-02-12 NOTE — TELEPHONE ENCOUNTER
Pending Prescriptions:                       Disp   Refills    busPIRone (BUSPAR) 10 MG tablet [Pharmacy*90 tab*0            Sig: TAKE 1 TABLET (10 MG) BY MOUTH DAILY. PLEASE MAKE           AN APPOINTMENT AND HAVE FASTING LABS DRAWN FOR           FURTHER REFILLS    Over-due for phq9.

## 2024-02-12 NOTE — PATIENT INSTRUCTIONS
"Andres Lock!      It was great meeting with you today! Here are some links to the handouts I referenced:        Protein Sources:  [email or web address]     Fiber Sources:  [email or web address]     My Plate:  https://www.choosemyplate.gov/        A helpful search term to type into [employer] ([employer]), [employer], etc is \"myplate examples.\" [Link: MyPlate examples Google Search ]     Key points from today:  Eat 3 meals/day at consistent intervals  Shoot for 60-90g protein (20-30g/meal)  Aim for 25-35g fiber (5-10g/meal)  Eat slowly: 20-30 minutes per meal     Here is a summary of the goals that we discussed:     1. Eat 3 meals/day  - Have your first meal within 1 hour of waking up, then every 4-6 hours     2. Macros:  2075-3957 calories  60-90g protein  25-35g fiber    The above macros can be achieved by balancing your plate as follows:  - 1/2 plate vegetables/fruit  - 1/4 plate protein  - 1/4 plate starch    See the links above for further information on this plate method       Let's plan on following up in 2-3 months. This can be scheduled via our call center at . Of course, reach out sooner if you have any questions or concerns. Have a great week and welcome to the program!        Mirella Santana RD, LD  Clinical Dietitian     "

## 2024-02-13 DIAGNOSIS — E78.5 HYPERLIPIDEMIA WITH TARGET LDL LESS THAN 100: ICD-10-CM

## 2024-02-13 RX ORDER — ATORVASTATIN CALCIUM 40 MG/1
40 TABLET, FILM COATED ORAL DAILY
Qty: 90 TABLET | Refills: 1 | Status: SHIPPED | OUTPATIENT
Start: 2024-02-13 | End: 2024-07-24

## 2024-02-13 RX ORDER — ATORVASTATIN CALCIUM 40 MG/1
TABLET, FILM COATED ORAL
Qty: 90 TABLET | Refills: 1 | Status: SHIPPED | OUTPATIENT
Start: 2024-02-13 | End: 2024-02-13

## 2024-02-13 RX ORDER — BUSPIRONE HYDROCHLORIDE 10 MG/1
10 TABLET ORAL DAILY
Qty: 90 TABLET | Refills: 1 | Status: SHIPPED | OUTPATIENT
Start: 2024-02-13 | End: 2024-07-24

## 2024-02-23 DIAGNOSIS — R10.13 DYSPEPSIA: ICD-10-CM

## 2024-02-23 DIAGNOSIS — F41.1 GENERALIZED ANXIETY DISORDER: ICD-10-CM

## 2024-02-23 DIAGNOSIS — F33.2 MAJOR DEPRESSIVE DISORDER, RECURRENT, SEVERE WITHOUT PSYCHOTIC FEATURES (H): ICD-10-CM

## 2024-02-25 RX ORDER — PAROXETINE 40 MG/1
40 TABLET, FILM COATED ORAL EVERY MORNING
Qty: 90 TABLET | Refills: 1 | Status: SHIPPED | OUTPATIENT
Start: 2024-02-25 | End: 2024-08-16

## 2024-04-01 DIAGNOSIS — E66.812 CLASS 2 SEVERE OBESITY DUE TO EXCESS CALORIES WITH SERIOUS COMORBIDITY AND BODY MASS INDEX (BMI) OF 38.0 TO 38.9 IN ADULT (H): Primary | ICD-10-CM

## 2024-04-01 DIAGNOSIS — E78.00 ELEVATED CHOLESTEROL: ICD-10-CM

## 2024-04-01 DIAGNOSIS — E66.01 CLASS 2 SEVERE OBESITY DUE TO EXCESS CALORIES WITH SERIOUS COMORBIDITY AND BODY MASS INDEX (BMI) OF 38.0 TO 38.9 IN ADULT (H): Primary | ICD-10-CM

## 2024-04-01 RX ORDER — SEMAGLUTIDE 2.4 MG/.75ML
2.4 INJECTION, SOLUTION SUBCUTANEOUS WEEKLY
Qty: 3 ML | Refills: 2 | Status: SHIPPED | OUTPATIENT
Start: 2024-04-01 | End: 2024-05-28

## 2024-04-15 ENCOUNTER — MYC MEDICAL ADVICE (OUTPATIENT)
Dept: FAMILY MEDICINE | Facility: CLINIC | Age: 54
End: 2024-04-15
Payer: COMMERCIAL

## 2024-04-15 ENCOUNTER — NURSE TRIAGE (OUTPATIENT)
Dept: FAMILY MEDICINE | Facility: CLINIC | Age: 54
End: 2024-04-15
Payer: COMMERCIAL

## 2024-04-15 NOTE — TELEPHONE ENCOUNTER
Nurse Triage SBAR    Is this a 2nd Level Triage? YES, LICENSED PRACTITIONER REVIEW IS REQUIRED    Situation: lower back pain radiating into left leg with numbness    Background: has received injections through [facility]. Told she has arthritis.     Assessment: lower back pain and left leg pain the past week. Limps sometimes. Numbness/pain from butt to knee. Back pain 5/10, leg pain 7/10. No injury or overuse. Worse when getting up after sitting. Worse when laying down. Better with rest.     Protocol Recommended Disposition:   See in Office Today    Recommendation: Routing to provider to review and advise.     MORGAN SANTAMARIA RN on 4/15/2024 at 4:23 PM   [facility]         Reason for Disposition   Numbness in a leg or foot (i.e., loss of sensation)    Additional Information   Negative: Major injury to the back (e.g., MVA, fall > 10 feet or 3 meters, penetrating injury, etc.)   Negative: Pain in the upper back over the ribs (rib cage) that radiates (travels) into the chest   Negative: Pain in the upper back over the ribs (rib cage) and worsened by coughing (or clearly increases with breathing)   Negative: Back pain during pregnancy   Negative: SEVERE back pain of sudden onset and age > 60 years   Negative: SEVERE abdominal pain (e.g., excruciating)   Negative: Abdominal pain and age > 60 years   Negative: Unable to urinate (or only a few drops) and bladder feels very full   Negative: Loss of bladder or bowel control (urine or bowel incontinence; wetting self, leaking stool) of new-onset   Negative: Numbness (loss of sensation) in groin or rectal area   Negative: Pain radiates into groin, scrotum   Negative: Blood in urine (red, pink, or tea-colored)   Negative: Vomiting and pain over lower ribs of back (i.e., flank - kidney area)   Negative: Weakness of a leg or foot (e.g., unable to bear weight, dragging foot)   Negative: Patient sounds very sick or weak to the triager   Negative: Fever > 100.4 F (38.0  "C) and flank pain   Negative: Pain or burning with passing urine (urination)   Negative: SEVERE back pain (e.g., excruciating, unable to do any normal activities) and not improved after pain medicine and CARE ADVICE   Negative: Numbness in an arm or hand (i.e., loss of sensation) and upper back pain    Answer Assessment - Initial Assessment Questions  1. ONSET: \"When did the pain begin?\"       Started a week ago,     2. LOCATION: \"Where does it hurt?\" (upper, mid or lower back)      Lower back    3. SEVERITY: \"How bad is the pain?\"  (e.g., Scale 1-10; mild, moderate, or severe)    - MILD (1-3): Doesn't interfere with normal activities.     - MODERATE (4-7): Interferes with normal activities or awakens from sleep.     - SEVERE (8-10): Excruciating pain, unable to do any normal activities.       5/10    4. PATTERN: \"Is the pain constant?\" (e.g., yes, no; constant, intermittent)       Gets better with walking. Worse after sitting, then standing up.     5. RADIATION: \"Does the pain shoot into your legs or somewhere else?\"      Goes down left leg to knee.     6. CAUSE:  \"What do you think is causing the back pain?\"       Unsure    7. BACK OVERUSE:  \"Any recent lifting of heavy objects, strenuous work or exercise?\"      No injury, no overwork    8. MEDICINES: \"What have you taken so far for the pain?\" (e.g., nothing, acetaminophen, NSAIDS)      No pain meds, just been stretching     9. NEUROLOGIC SYMPTOMS: \"Do you have any weakness, numbness, or problems with bowel/bladder control?\"      Numbness butt to knee - 7/10. Find myself limping.     10. OTHER SYMPTOMS: \"Do you have any other symptoms?\" (e.g., fever, abdomen pain, burning with urination, blood in urine)        Shots - was told arthritis.     11. PREGNANCY: \"Is there any chance you are pregnant?\" \"When was your last menstrual period?\"        N/a    Protocols used: Back Pain-A-OH    "

## 2024-04-16 NOTE — TELEPHONE ENCOUNTER
Recommend OV or UC visit. She could also follow up with [facility] if she is having similar symptoms which they have treated in the past.    Dominik Arizmendi,   4/15/2024 11:44 PM

## 2024-04-16 NOTE — TELEPHONE ENCOUNTER
Called patient and explained providers note.     [facility] told patient that due to her leg is involved go to primary care     Patient declined UC, advised of locations and hours.     Scheduled next available with pcp

## 2024-04-18 ENCOUNTER — OFFICE VISIT (OUTPATIENT)
Dept: FAMILY MEDICINE | Facility: CLINIC | Age: 54
End: 2024-04-18
Payer: COMMERCIAL

## 2024-04-18 VITALS
WEIGHT: 214 LBS | RESPIRATION RATE: 12 BRPM | TEMPERATURE: 98.3 F | OXYGEN SATURATION: 100 % | BODY MASS INDEX: 36.54 KG/M2 | SYSTOLIC BLOOD PRESSURE: 124 MMHG | HEART RATE: 78 BPM | DIASTOLIC BLOOD PRESSURE: 74 MMHG | HEIGHT: 64 IN

## 2024-04-18 DIAGNOSIS — F33.2 MAJOR DEPRESSIVE DISORDER, RECURRENT, SEVERE WITHOUT PSYCHOTIC FEATURES (H): ICD-10-CM

## 2024-04-18 DIAGNOSIS — M54.16 LUMBAR RADICULOPATHY: Primary | ICD-10-CM

## 2024-04-18 PROCEDURE — 99213 OFFICE O/P EST LOW 20 MIN: CPT | Performed by: FAMILY MEDICINE

## 2024-04-18 RX ORDER — METHYLPREDNISOLONE 4 MG
TABLET, DOSE PACK ORAL
Qty: 21 TABLET | Refills: 0 | Status: SHIPPED | OUTPATIENT
Start: 2024-04-18 | End: 2024-10-01

## 2024-04-18 ASSESSMENT — ANXIETY QUESTIONNAIRES
GAD7 TOTAL SCORE: 0
4. TROUBLE RELAXING: NOT AT ALL
3. WORRYING TOO MUCH ABOUT DIFFERENT THINGS: NOT AT ALL
7. FEELING AFRAID AS IF SOMETHING AWFUL MIGHT HAPPEN: NOT AT ALL
6. BECOMING EASILY ANNOYED OR IRRITABLE: NOT AT ALL
GAD7 TOTAL SCORE: 0
5. BEING SO RESTLESS THAT IT IS HARD TO SIT STILL: NOT AT ALL
GAD7 TOTAL SCORE: 0
1. FEELING NERVOUS, ANXIOUS, OR ON EDGE: NOT AT ALL
8. IF YOU CHECKED OFF ANY PROBLEMS, HOW DIFFICULT HAVE THESE MADE IT FOR YOU TO DO YOUR WORK, TAKE CARE OF THINGS AT HOME, OR GET ALONG WITH OTHER PEOPLE?: NOT DIFFICULT AT ALL
IF YOU CHECKED OFF ANY PROBLEMS ON THIS QUESTIONNAIRE, HOW DIFFICULT HAVE THESE PROBLEMS MADE IT FOR YOU TO DO YOUR WORK, TAKE CARE OF THINGS AT HOME, OR GET ALONG WITH OTHER PEOPLE: NOT DIFFICULT AT ALL
7. FEELING AFRAID AS IF SOMETHING AWFUL MIGHT HAPPEN: NOT AT ALL
2. NOT BEING ABLE TO STOP OR CONTROL WORRYING: NOT AT ALL

## 2024-04-18 ASSESSMENT — PATIENT HEALTH QUESTIONNAIRE - PHQ9
SUM OF ALL RESPONSES TO PHQ QUESTIONS 1-9: 1
10. IF YOU CHECKED OFF ANY PROBLEMS, HOW DIFFICULT HAVE THESE PROBLEMS MADE IT FOR YOU TO DO YOUR WORK, TAKE CARE OF THINGS AT HOME, OR GET ALONG WITH OTHER PEOPLE: NOT DIFFICULT AT ALL
SUM OF ALL RESPONSES TO PHQ QUESTIONS 1-9: 1

## 2024-04-18 ASSESSMENT — PAIN SCALES - GENERAL: PAINLEVEL: MODERATE PAIN (5)

## 2024-04-18 ASSESSMENT — ENCOUNTER SYMPTOMS: BACK PAIN: 1

## 2024-04-18 NOTE — PROGRESS NOTES
"  Assessment & Plan     Lumbar radiculopathy  Start oral steroid, physical therapy. If no improvement, consider MRI lumbar spine and spine consult  - methylPREDNISolone (MEDROL DOSEPAK) 4 MG tablet therapy pack; Follow Package Directions  - Physical Therapy  Referral; Future    Major depressive disorder, recurrent, severe without psychotic features (H)  Stable, continue current medications            BMI  Estimated body mass index is 36.73 kg/m  as calculated from the following:    Height as of this encounter: 1.626 m (5' 4\").    Weight as of this encounter: 97.1 kg (214 lb).         Duyen Lock is a 53 year old, presenting for the following health issues:  Back Pain        4/18/2024     9:40 AM   Additional Questions   Roomed by YASH RAMÍREZ   Accompanied by SELF     History of Present Illness       Back Pain:  She presents for follow up of back pain. Patient's back pain is a recurring problem.  Location of back pain:  Left lower back, left buttock, left hip and left side of waist  Description of back pain: burning, dull ache, shooting and stabbing  Back pain spreads: left buttocks, left thigh, left knee and left foot    Since patient first noticed back pain, pain is: always present, but gets better and worse  Does back pain interfere with her job:  No       She eats 2-3 servings of fruits and vegetables daily.She consumes 0 sweetened beverage(s) daily.She exercises with enough effort to increase her heart rate 10 to 19 minutes per day.  She exercises with enough effort to increase her heart rate 4 days per week.   She is taking medications regularly.       Pain started about 2 weeks ago. Also has numbness. Worse at night when laying down or sitting. Feels better when walking around. Pain starts in upper butt and radiates down side of left leg and over the front of the leg. No weakness in extremity.      Situation: lower back pain radiating into left leg with numbness     Background: has received " "injections through [facility]. Told she has arthritis.      Assessment: lower back pain and left leg pain the past week. Limps sometimes. Numbness/pain from butt to knee. Back pain 5/10, leg pain 7/10. No injury or overuse. Worse when getting up after sitting. Worse when laying down. Better with rest.              Review of Systems  Constitutional, HEENT, cardiovascular, pulmonary, gi and gu systems are negative, except as otherwise noted.      Objective    /74   Pulse 78   Temp 98.3  F (36.8  C) (Tympanic)   Resp 12   Ht 1.626 m (5' 4\")   Wt 97.1 kg (214 lb)   LMP  (LMP Unknown)   SpO2 100%   BMI 36.73 kg/m    Body mass index is 36.73 kg/m .  Physical Exam   GENERAL: alert and no distress  Comprehensive back pain exam:  No tenderness, Range of motion not limited by pain, Lower extremity strength functional and equal on both sides, Lower extremity reflexes within normal limits bilaterally, and Straight leg positive on  left, indicating possible ipsilateral radiculopathy            Signed Electronically by: Dominik Arizmendi DO  "

## 2024-04-29 ENCOUNTER — THERAPY VISIT (OUTPATIENT)
Dept: PHYSICAL THERAPY | Facility: CLINIC | Age: 54
End: 2024-04-29
Attending: FAMILY MEDICINE
Payer: COMMERCIAL

## 2024-04-29 DIAGNOSIS — M54.16 LUMBAR RADICULOPATHY: ICD-10-CM

## 2024-04-29 PROCEDURE — 97161 PT EVAL LOW COMPLEX 20 MIN: CPT | Mod: GP | Performed by: PHYSICAL THERAPIST

## 2024-04-29 PROCEDURE — 97530 THERAPEUTIC ACTIVITIES: CPT | Mod: GP | Performed by: PHYSICAL THERAPIST

## 2024-04-29 NOTE — PROGRESS NOTES
"PHYSICAL THERAPY EVALUATION  Type of Visit: Evaluation    See electronic medical record for Abuse and Falls Screening details.    Subjective       Presenting condition or subjective complaint:  \"Left Leg pain.  Starts in my left butt and runs down to my ankle\"  Patient notes that she has experienced intermittent back pain in the past. In the past few weeks she took a tapering dose of prednisone, but reports minimal relief with this. She sits for her job most of the day. Prefers to sleep on the L side but this is painful.  Date of onset: 04/18/24    Relevant medical history: Depression; Hearing problems; Overweight   Dates & types of surgery:  none relevant reported   Prior diagnostic imaging/testing results: MRI; CT scan; X-ray     Prior therapy history for the same diagnosis, illness or injury: No      Prior Level of Function  Transfers: Independent  Ambulation: Independent  ADL: Independent  IADL: Driving, Finances, Housekeeping, Laundry, Meal preparation, Medication management, Work    Living Environment  Social support: With a significant other or spouse   Type of home: House   Stairs to enter the home: No       Ramp: No   Stairs inside the home: Yes 13 Is there a railing: Yes   Help at home: None  Equipment owned:       Employment: Yes  / [employer]  Hobbies/Interests: Walking- tolerated 1.5 miles yesterday and would like to walk 3 miles consistently without pain  Patient goals for therapy: Walk and sit longer; improved sleep quality    Pain assessment: Pain present- symptoms present for 3 week     Objective   LUMBAR SPINE EVALUATION  Increased discomfort  with L sideglide rather than R side; back pain with R sdie glide on L side    Lumbar flexion  POSTURE:  impaired, fwd rounded shoulder and head positions  GAIT: antalgic, fwd flexed- symptom limited by distance   ROM:  lumbar extension: moderate impairment, flexion: mild impairment, increased discomfort with R sideglide compared to L sideglide, mild " limitations in both directions  PELVIC/SI SCREEN: mild   STRENGTH: B LE strength WNL  MYOTOMES:  WFL for lumbar  NEURAL TENSION:  WFL lumbar spine  FLEXIBILITY: WFL  PALPATION:  mild TTP to central lumbar spine    Assessment & Plan   CLINICAL IMPRESSIONS  Medical Diagnosis: Lumbar radiculopathy (M54.16)    Treatment Diagnosis: Lumbar radiculopathy   Impression/Assessment: Patient is a 53 year old female with low back complaints.  The following significant findings have been identified: Pain, Decreased ROM/flexibility, Decreased joint mobility, Impaired gait, Impaired muscle performance, Decreased activity tolerance, and Impaired posture. These impairments interfere with their ability to perform self care tasks, work tasks, recreational activities, household chores, driving , household mobility, and community mobility as compared to previous level of function.     Clinical Decision Making (Complexity):  Clinical Presentation: Stable/Uncomplicated  Clinical Presentation Rationale: based on medical and personal factors listed in PT evaluation  Clinical Decision Making (Complexity): Low complexity    PLAN OF CARE  Treatment Interventions:  Interventions: Gait Training, Manual Therapy, Neuromuscular Re-education, Therapeutic Activity, Therapeutic Exercise, Self-Care/Home Management    Long Term Goals     PT Goal 1  Goal Identifier: HEP  Goal Description: Patient will be consistent and independent with HEP in order to achieve functional goals.  Rationale: to maximize safety and independence with performance of ADLs and functional tasks;to maximize safety and independence within the home;to maximize safety and independence within the community;to maximize safety and independence with self cares  Target Date: 05/27/24  PT Goal 2  Goal Identifier: Walking  Goal Description: Patient will be able to walk 3 miles without radiating symptoms.  Rationale: to maximize safety and independence within the community;to maximize safety  and independence with transportation;to maximize safety and independence within the home  Target Date: 06/24/24      Frequency of Treatment: 1x per week  Duration of Treatment: 8 weeks    Recommended Referrals to Other Professionals:   Education Assessment:   Learner/Method: Patient;No Barriers to Learning  Education Comments: reviewed PT POC, established goals  Risks and benefits of evaluation/treatment have been explained.   Patient/Family/caregiver agrees with Plan of Care.     Evaluation Time:     PT Ray Low Complexity Minutes (66109): 20     Signing Clinician: Laurel Nuñez PT

## 2024-05-06 ENCOUNTER — PATIENT OUTREACH (OUTPATIENT)
Dept: CARE COORDINATION | Facility: CLINIC | Age: 54
End: 2024-05-06
Payer: COMMERCIAL

## 2024-05-07 ENCOUNTER — THERAPY VISIT (OUTPATIENT)
Dept: PHYSICAL THERAPY | Facility: CLINIC | Age: 54
End: 2024-05-07
Payer: COMMERCIAL

## 2024-05-07 DIAGNOSIS — M54.16 LUMBAR RADICULOPATHY: Primary | ICD-10-CM

## 2024-05-07 PROCEDURE — 97110 THERAPEUTIC EXERCISES: CPT | Mod: GP | Performed by: PHYSICAL THERAPIST

## 2024-05-16 NOTE — PROGRESS NOTES
"Hayde is a 53 year old who is being evaluated via a billable video visit.      The patient has been notified of following:     \"This video visit will be conducted via a call between you and your physician/provider. We have found that certain health care needs can be provided without the need for an in-person physical exam.  This service lets us provide the care you need with a video conversation.  If a prescription is necessary we can send it directly to your pharmacy.  If lab work is needed we can place an order for that and you can then stop by our lab to have the test done at a later time.    Video visits are billed at different rates depending on your insurance coverage.  Please reach out to your insurance provider with any questions.    If during the course of the call the physician/provider feels a video visit is not appropriate, you will not be charged for this service.\"    Patient has given verbal consent for Video visit? Yes    How would you like to obtain your AVS? MyChart    If the video visit is dropped, the invitation should be resent by: MY CHART  Will anyone else be joining your video visit? No    I    Video-Visit Details    Type of service:  Video Visit    Originating Location (pt. Location): Home    Distant Location (provider location):   [facility]    Platform used for Video Visit: Kutuan    Video Start Time: 11:44 AM    Video End Time: 11:53 AM    2024      Return Medical Weight Management Note     Hayde Monique  MRN:  [id number]  :  1970    Assessment & Plan   Problem List Items Addressed This Visit       Class 2 severe obesity due to excess calories with serious comorbidity and body mass index (BMI) of 38.0 to 38.9 in adult (H)     Patient was congratulated on wt loss success thus far. Healthy habits to assist with further weight loss were discussed. Hayde will continue 2.4 mg Wegovy once weekly. Discussed switching to Zepbound this fall for " better response - on shortage currently so will hold off for now.           Relevant Medications    Semaglutide-Weight Management (WEGOVY) 2.4 MG/0.75ML pen    Elevated cholesterol    Relevant Medications    Semaglutide-Weight Management (WEGOVY) 2.4 MG/0.75ML pen        AOM Considerations:  Phentermine:   SSRI consideration with paroxetine and buspirone  Topiramate: Birth control: PostmenopausalCNS depression with paroxetine  GLP-1: Monitor blood sugars, Wegovy and Saxenda appear covered   Naltrexone: No drug interactions seen             Wellbutrin: Monitor response to paroxetine  Metformin: Monitor blood sugars          Contrave:  Qsymia:         PATIENT INSTRUCTIONS:  Pt Instructions:  Continue Wegovy 2.4 mg once weekly. Refills sent.     Goals:  Continue to Focus on healthy food choices - prioritizing protein and veggies in your meals. I recommend eating every 4-6 hours to reduce the risk of low blood sugars and try to minimize snacking between meals. Stay well hydrated though the day as well.  Great work w/physical therapy and incorporate strength training as well!    Follow up:    Call [phone] to schedule next visit in 4 month    13 minutes spent on the date of the encounter doing chart review, history and exam, result review, counseling, developing plan of care, documentation, and further activities as noted      INTERVAL HISTORY:  Hayde returns for medical weight management follow up.  Last seen on 1/9/2024 with plan at that time to continue with Saxenda but planning on likely switching to Wegovy due to Saxenda shortages.    Shawarmanji messaging from 4/18/2024 was going to continue increasing to 2.4 mg Wegovy.    WEIGHT METRICS:  Body mass index is 35.7 kg/m .   Current Weight: 208 lb (94.3 kg) (PATIENT REPORTED)     Initial Weight (lbs): 234.8 lbs  Cumulative weight loss (lbs): 26.8  Weight Loss Percentage: 11.41%    Wt Readings from Last 10 Encounters:   05/28/24 208 lb (94.3 kg)   04/18/24 214 lb  "(97.1 kg)   02/12/24 221 lb (100.2 kg)   01/08/24 224 lb (101.6 kg)   11/06/23 230 lb (104.3 kg)   10/05/23 232 lb (105.2 kg)   08/04/23 234 lb 12.8 oz (106.5 kg)   08/03/23 234 lb 12.8 oz (106.5 kg)   06/22/23 233 lb (105.7 kg)   05/30/23 229 lb (103.9 kg)                 5/27/2024     4:51 PM   Weight Loss Medication History Reviewed With Patient   Which weight loss medications are you currently taking on a regular basis? Wegovy   Are you having any side effects from the weight loss medication that we have prescribed you? No           5/27/2024     4:51 PM   Changes and Difficulties   I have made the following changes to my diet since my last visit: Drinking less, less coffee, mushroom coffee, walking   With regards to my diet, I am still struggling with: More weight loss   I have made the following changes to my activity/exercise since my last visit: Walking more and Physical Therapy     Mushroom coffee for past year.     Discussed switching to Zepbound this fall for better response    LABS:  Reviewed in Epic    1/18/2024 BMP normal    BP Readings from Last 6 Encounters:   04/18/24 124/74   10/05/23 118/78   08/03/23 138/82   06/22/23 130/88   05/30/23 134/80   05/03/23 136/85       Pulse Readings from Last 6 Encounters:   04/18/24 78   10/05/23 75   08/03/23 83   05/30/23 99   05/03/23 86   01/11/23 70       PE:  Ht 5' 4\" (1.626 m)   Wt 208 lb (94.3 kg)   LMP  (LMP Unknown)   BMI 35.70 kg/m    GENERAL: Healthy, alert and no distress  EYES: Eyes grossly normal to inspection.    RESP: No audible wheeze, cough, or visible cyanosis.  No increased work of breathing.    SKIN: Visible skin clear. No significant rash, abnormal pigmentation or lesions.  NEURO: Mentation and speech appropriate for age.  PSYCH: Mentation appears normal, affect normal/bright, judgement and insight intact, normal speech and appearance well-groomed.      Sincerely,      ELAN Beck-C       "

## 2024-05-28 ENCOUNTER — MYC MEDICAL ADVICE (OUTPATIENT)
Dept: FAMILY MEDICINE | Facility: CLINIC | Age: 54
End: 2024-05-28

## 2024-05-28 ENCOUNTER — VIRTUAL VISIT (OUTPATIENT)
Dept: SURGERY | Facility: CLINIC | Age: 54
End: 2024-05-28
Payer: COMMERCIAL

## 2024-05-28 VITALS — BODY MASS INDEX: 35.51 KG/M2 | WEIGHT: 208 LBS | HEIGHT: 64 IN

## 2024-05-28 DIAGNOSIS — E78.00 ELEVATED CHOLESTEROL: ICD-10-CM

## 2024-05-28 DIAGNOSIS — E66.812 CLASS 2 SEVERE OBESITY DUE TO EXCESS CALORIES WITH SERIOUS COMORBIDITY AND BODY MASS INDEX (BMI) OF 35.0 TO 35.9 IN ADULT (H): ICD-10-CM

## 2024-05-28 DIAGNOSIS — M54.16 LUMBAR RADICULOPATHY: Primary | ICD-10-CM

## 2024-05-28 DIAGNOSIS — E66.01 CLASS 2 SEVERE OBESITY DUE TO EXCESS CALORIES WITH SERIOUS COMORBIDITY AND BODY MASS INDEX (BMI) OF 35.0 TO 35.9 IN ADULT (H): ICD-10-CM

## 2024-05-28 PROBLEM — E66.09 CLASS 1 OBESITY DUE TO EXCESS CALORIES WITHOUT SERIOUS COMORBIDITY WITH BODY MASS INDEX (BMI) OF 32.0 TO 32.9 IN ADULT: Status: RESOLVED | Noted: 2017-11-03 | Resolved: 2024-05-28

## 2024-05-28 PROBLEM — E66.811 CLASS 1 OBESITY DUE TO EXCESS CALORIES WITHOUT SERIOUS COMORBIDITY WITH BODY MASS INDEX (BMI) OF 32.0 TO 32.9 IN ADULT: Status: RESOLVED | Noted: 2017-11-03 | Resolved: 2024-05-28

## 2024-05-28 PROCEDURE — 99213 OFFICE O/P EST LOW 20 MIN: CPT | Mod: 95 | Performed by: PHYSICIAN ASSISTANT

## 2024-05-28 RX ORDER — SEMAGLUTIDE 2.4 MG/.75ML
2.4 INJECTION, SOLUTION SUBCUTANEOUS WEEKLY
Qty: 3 ML | Refills: 4 | Status: SHIPPED | OUTPATIENT
Start: 2024-05-28 | End: 2024-09-23

## 2024-05-28 NOTE — ASSESSMENT & PLAN NOTE
Patient was congratulated on wt loss success thus far. Healthy habits to assist with further weight loss were discussed. Hayde will continue 2.4 mg Wegovy once weekly. Discussed switching to Zepbound this fall for better response - on shortage currently so will hold off for now.

## 2024-05-28 NOTE — PATIENT INSTRUCTIONS
Nice to talk with you today. Below is the plan discussed.-  Kate Gupta, PAMacoC     Pt Instructions:  Continue Wegovy 2.4 mg once weekly. Refills sent.     Goals:  Continue to Focus on healthy food choices - prioritizing protein and veggies in your meals. I recommend eating every 4-6 hours to reduce the risk of low blood sugars and try to minimize snacking between meals. Stay well hydrated though the day as well.  Great work w/physical therapy and incorporate strength training as well!    Follow up:    Call [phone] to schedule next visit in 4 month

## 2024-05-30 NOTE — TELEPHONE ENCOUNTER
Referral placed for [facility] pain clinic. Please let patient know and fax the referral. Thanks.    Dominik Arizmendi DO  5/30/2024 1:21 PM

## 2024-06-03 NOTE — TELEPHONE ENCOUNTER
Cld and talked to pt about 2 referrals. Pt will call for MRI due to symptoms  - Diagnostic imaging in [place]. Declined taking # as she was walking.  Will wait for them to call or call us back to get #.    Closed encounter    Debora FRIED

## 2024-06-03 NOTE — TELEPHONE ENCOUNTER
See patient response about xrays. -Routing to Dr. Arizmendi.     Also routing to  [place] to assist is faxing referral.

## 2024-06-03 NOTE — TELEPHONE ENCOUNTER
Will place order for x-ray. If having the pain radiating into legs, likely needs MRI as well so will order this too.     Dominik Arizmendi,   6/3/2024 11:21 AM

## 2024-06-24 NOTE — PROGRESS NOTES
DISCHARGE  Reason for Discharge: Patient has failed to schedule further appointments.    Equipment Issued: none    Discharge Plan: Patient to continue home program.     05/07/24 0500   Appointment Info   Signing clinician's name / credentials Sofia Kaye DPT, Cert. MDT   Visits Used 2   Medical Diagnosis Lumbar radiculopathy (M54.16)   PT Tx Diagnosis Lumbar radiculopathy   Progress Note/Certification   Onset of illness/injury or Date of Surgery 04/18/24   Therapy Frequency 1x per week   Predicted Duration 8 weeks   Progress Note Due Date 06/24/24   Progress Note Completed Date 06/24/24   GOALS   PT Goals 2   PT Goal 1   Goal Identifier HEP   Goal Description Patient will be consistent and independent with HEP in order to achieve functional goals.   Rationale to maximize safety and independence with performance of ADLs and functional tasks;to maximize safety and independence within the home;to maximize safety and independence within the community;to maximize safety and independence with self cares   Goal Progress modifying exercises still to try to find one that will clear derangement - currently trialing unilateral lumbar extension   Target Date 05/27/24   PT Goal 2   Goal Identifier Walking   Goal Description Patient will be able to walk 3 miles without radiating symptoms.   Rationale to maximize safety and independence within the community;to maximize safety and independence with transportation;to maximize safety and independence within the home   Goal Progress no change yet   Target Date 06/24/24   Subjective Report   Subjective Report Pt reports that her pain is the same - along lateral hip and can radiate down L thigh to knee - can go to her foot if she is lying down.  She also has ended with the prednisone and that initially helped, but now it is back to the initial pain.  An MRI was done two years ago, which showed arthritis.Sitting and lying down makes her pain worse - walking makes it feel better.  Sleeping is when the pain is worse.  Lying on her back is the most comfortable, but she can't sleep on either side. She gets numbness only when she is lying down along the side of her hip.   Objective Measures   Objective Measures Objective Measure 1   Objective Measure 1   Objective Measure lumbar ROM   Details flex WNL, ext WNL, R SG min loss (++), L SG WNL (-).  Decreased pain with R SG after unilateral lumbar extension with L LE on table - trial for HEP   Treatment Interventions (PT)   Interventions Therapeutic Procedure/Exercise;Therapeutic Activity   Therapeutic Procedure/Exercise   Therapeutic Procedures: strength, endurance, ROM, flexibility minutes (20976) 30   Therapeutic Procedures Ther Proc 2;Ther Proc 3;Ther Proc 4   Ther Proc 1 supine KTC   Ther Proc 1 - Details x10 reps - NE/NE/NE - clinic only - movement testing   Ther Proc 2 prone lumbar extension   Ther Proc 2 - Details x10 reps - centralized/B/NE on lumbar ROM - clinic only - movement testing   Ther Proc 3 unilateral lumbar extension w/L LE on table   Ther Proc 3 - Details x10 reps - abolished/B/decreased pain with R SG - trial for HEP.  Educated pt on frequency of HEP, traffic light guide for pain, and purpose of HEP.   Skilled Intervention Cues for form and control throughout   Patient Response/Progress demonstrated understanding   Ther Proc 4 prone press-up w/hips shifted to the R   Ther Proc 4 - Details demonstrated this to pt as an alternative option for stretch - can try if unilateral lumbar extension is difficult to do at home.  Showed pt how to get up from floor in a way that keeps her from going into lumbar flexion.   Therapeutic Activity   Therapeutic Activities: dynamic activities to improve functional performance minutes (97853) 2   Ther Act 1 Lumbar Posture Modification/Use of Roll   Ther Act 1 - Details educated pt on not flexing at spine when painting or doing other chores/ADL's - flex at knees/hips vs lumbar spine   Patient  Response/Progress pt demonstrated understanding   Skilled Intervention education   Education   Learner/Method Patient;No Barriers to Learning   Education Comments reviewed PT POC, established goals   Plan   Home program Ptrx HEP   Updates to plan of care initiated unilateral lumbar extension since abolished sx   Plan for next session Re-assess, progress as able to promote improved tolerance to walking.   Total Session Time   Timed Code Treatment Minutes 32   Total Treatment Time (sum of timed and untimed services) 32         Referring Provider:  Dominik Arizmendi

## 2024-06-25 ENCOUNTER — MYC MEDICAL ADVICE (OUTPATIENT)
Dept: FAMILY MEDICINE | Facility: CLINIC | Age: 54
End: 2024-06-25
Payer: COMMERCIAL

## 2024-06-28 ENCOUNTER — ANCILLARY PROCEDURE (OUTPATIENT)
Dept: MRI IMAGING | Facility: CLINIC | Age: 54
End: 2024-06-28
Attending: FAMILY MEDICINE
Payer: COMMERCIAL

## 2024-06-28 ENCOUNTER — ANCILLARY PROCEDURE (OUTPATIENT)
Dept: GENERAL RADIOLOGY | Facility: CLINIC | Age: 54
End: 2024-06-28
Attending: FAMILY MEDICINE
Payer: COMMERCIAL

## 2024-06-28 DIAGNOSIS — M54.16 LUMBAR RADICULOPATHY: ICD-10-CM

## 2024-06-28 PROCEDURE — 72148 MRI LUMBAR SPINE W/O DYE: CPT | Mod: GC | Performed by: RADIOLOGY

## 2024-06-28 PROCEDURE — 72100 X-RAY EXAM L-S SPINE 2/3 VWS: CPT | Performed by: STUDENT IN AN ORGANIZED HEALTH CARE EDUCATION/TRAINING PROGRAM

## 2024-07-17 ENCOUNTER — TELEPHONE (OUTPATIENT)
Dept: FAMILY MEDICINE | Facility: CLINIC | Age: 54
End: 2024-07-17
Payer: COMMERCIAL

## 2024-07-17 NOTE — TELEPHONE ENCOUNTER
Referral already signed in other encounter. Please check if that is sufficient. Thanks    Dominik Arizmendi DO  7/17/2024 12:41 PM

## 2024-07-17 NOTE — TELEPHONE ENCOUNTER
"Patient saw Dr. Britton Pascal at [facility] in [place] and is now scheduled to have \"an epidural shot\" tomorrow, 7/18/24.  That office has asked her to contact primary care provider and get a referral for the [facility].  NPI [id number].    Please fax copy of referral to [facility] at [phone].  CAMACHO Cervantes R.N.    "

## 2024-07-24 DIAGNOSIS — F41.1 GENERALIZED ANXIETY DISORDER: ICD-10-CM

## 2024-07-24 DIAGNOSIS — E78.5 HYPERLIPIDEMIA WITH TARGET LDL LESS THAN 100: ICD-10-CM

## 2024-07-24 DIAGNOSIS — F33.2 MAJOR DEPRESSIVE DISORDER, RECURRENT, SEVERE WITHOUT PSYCHOTIC FEATURES (H): ICD-10-CM

## 2024-07-24 RX ORDER — BUSPIRONE HYDROCHLORIDE 10 MG/1
10 TABLET ORAL DAILY
Qty: 90 TABLET | Refills: 1 | Status: SHIPPED | OUTPATIENT
Start: 2024-07-24

## 2024-07-24 RX ORDER — ATORVASTATIN CALCIUM 40 MG/1
40 TABLET, FILM COATED ORAL DAILY
Qty: 90 TABLET | Refills: 1 | Status: SHIPPED | OUTPATIENT
Start: 2024-07-24

## 2024-08-06 ENCOUNTER — MYC MEDICAL ADVICE (OUTPATIENT)
Dept: FAMILY MEDICINE | Facility: CLINIC | Age: 54
End: 2024-08-06
Payer: COMMERCIAL

## 2024-08-06 DIAGNOSIS — M54.16 LUMBAR RADICULOPATHY: Primary | ICD-10-CM

## 2024-08-07 ENCOUNTER — MEDICAL CORRESPONDENCE (OUTPATIENT)
Dept: HEALTH INFORMATION MANAGEMENT | Facility: CLINIC | Age: 54
End: 2024-08-07

## 2024-08-07 NOTE — TELEPHONE ENCOUNTER
Unable to change the date on the referral but indicated date of service as 7/18/2024 on referral. We don't have a generic referral to send to [facility] so I also indicated that this is where the pain management referral was meant to be sent and also included the NPI number.     Dominik Arizmendi DO  8/7/2024 12:38 PM

## 2024-08-08 NOTE — TELEPHONE ENCOUNTER
Placed call to [facility], spoke with Tatum. Received surgery center fax # [phone]. And advised of Dr. Arizmendi message below. Tatum reports referral doesn't need to be backdated - since provider included date of service 7/18/24.    Referral faxed. [employer] message sent to patient to notify.

## 2024-08-16 ENCOUNTER — TELEPHONE (OUTPATIENT)
Dept: SURGERY | Facility: CLINIC | Age: 54
End: 2024-08-16
Payer: COMMERCIAL

## 2024-08-16 DIAGNOSIS — F41.1 GENERALIZED ANXIETY DISORDER: ICD-10-CM

## 2024-08-16 DIAGNOSIS — F33.2 MAJOR DEPRESSIVE DISORDER, RECURRENT, SEVERE WITHOUT PSYCHOTIC FEATURES (H): ICD-10-CM

## 2024-08-16 RX ORDER — PAROXETINE 40 MG/1
40 TABLET, FILM COATED ORAL EVERY MORNING
Qty: 90 TABLET | Refills: 0 | Status: SHIPPED | OUTPATIENT
Start: 2024-08-16

## 2024-08-16 NOTE — TELEPHONE ENCOUNTER
"Prior Authorization Retail Medication Request    Medication/Dose:  Wegovy 2.4 Mg    ICD code (if different than what is on RX):  E66.01, Z68.35 E78.00     Previously Tried and Failed:  Diet and Lifestyle Changes    Rationale:  Weight Management    Hx of obesity   Ht: 5' 4\"  Body mass index is 35.7 kg/m .   Current Weight:  208 lb (94.3 kg)   Last Visits Weight: 224 lb (38.45 kg)  Initial Weight (lbs): 234.8 lbs Date: 8/3/23  Initial BMI: 40.3 Date: 8/3/23  Cumulative weight loss (lbs): 26.8  Weight Loss Percentage: 11.41%    Insurance Name:  [employer]  Insurance ID:  [id number]      Pharmacy Information (if different than what is on RX)  Name:  [facility] - [place], [place] - [facility] [address]   Phone:  [phone]    "

## 2024-08-19 NOTE — TELEPHONE ENCOUNTER
PA Initiation    Medication: WEGOVY 2.4 MG/0.75ML SC SOAJ  Insurance Company: [employer] - Phone [phone] Fax [phone]  Pharmacy Filling the Rx: [facility] - [place], [place] - [address]  Filling Pharmacy Phone: [phone]  Start Date: 8/19/2024

## 2024-08-19 NOTE — TELEPHONE ENCOUNTER
PRIOR AUTHORIZATION DENIED    Medication: WEGOVY 2.4 MG/0.75ML SC SOAJ  Insurance Company: [employer] - Phone [phone] Fax [phone]  Denial Date: 8/19/2024  Denial Reason(s): not covered benefit. Primary Insurance covers most of the cost.    Appeal Information: NA  Patient Notified: No

## 2024-08-27 ENCOUNTER — PATIENT OUTREACH (OUTPATIENT)
Dept: CARE COORDINATION | Facility: CLINIC | Age: 54
End: 2024-08-27
Payer: COMMERCIAL

## 2024-09-03 ENCOUNTER — PATIENT OUTREACH (OUTPATIENT)
Dept: CARE COORDINATION | Facility: CLINIC | Age: 54
End: 2024-09-03
Payer: COMMERCIAL

## 2024-09-23 ENCOUNTER — MYC REFILL (OUTPATIENT)
Dept: FAMILY MEDICINE | Facility: CLINIC | Age: 54
End: 2024-09-23
Payer: COMMERCIAL

## 2024-09-23 ENCOUNTER — MYC REFILL (OUTPATIENT)
Dept: SURGERY | Facility: CLINIC | Age: 54
End: 2024-09-23
Payer: COMMERCIAL

## 2024-09-23 DIAGNOSIS — E66.812 CLASS 2 SEVERE OBESITY DUE TO EXCESS CALORIES WITH SERIOUS COMORBIDITY AND BODY MASS INDEX (BMI) OF 35.0 TO 35.9 IN ADULT (H): ICD-10-CM

## 2024-09-23 DIAGNOSIS — F33.2 MAJOR DEPRESSIVE DISORDER, RECURRENT, SEVERE WITHOUT PSYCHOTIC FEATURES (H): ICD-10-CM

## 2024-09-23 DIAGNOSIS — F41.1 GENERALIZED ANXIETY DISORDER: ICD-10-CM

## 2024-09-23 DIAGNOSIS — R10.13 DYSPEPSIA: ICD-10-CM

## 2024-09-23 DIAGNOSIS — E78.5 HYPERLIPIDEMIA WITH TARGET LDL LESS THAN 100: ICD-10-CM

## 2024-09-23 DIAGNOSIS — E78.00 ELEVATED CHOLESTEROL: ICD-10-CM

## 2024-09-23 DIAGNOSIS — E66.01 CLASS 2 SEVERE OBESITY DUE TO EXCESS CALORIES WITH SERIOUS COMORBIDITY AND BODY MASS INDEX (BMI) OF 35.0 TO 35.9 IN ADULT (H): ICD-10-CM

## 2024-09-23 RX ORDER — PAROXETINE 40 MG/1
40 TABLET, FILM COATED ORAL EVERY MORNING
Qty: 90 TABLET | Refills: 0 | OUTPATIENT
Start: 2024-09-23

## 2024-09-23 RX ORDER — BUSPIRONE HYDROCHLORIDE 10 MG/1
10 TABLET ORAL DAILY
Qty: 90 TABLET | Refills: 1 | OUTPATIENT
Start: 2024-09-23

## 2024-09-23 RX ORDER — SEMAGLUTIDE 2.4 MG/.75ML
2.4 INJECTION, SOLUTION SUBCUTANEOUS WEEKLY
Qty: 3 ML | Refills: 0 | Status: SHIPPED | OUTPATIENT
Start: 2024-09-23 | End: 2024-10-01

## 2024-09-23 RX ORDER — ATORVASTATIN CALCIUM 40 MG/1
40 TABLET, FILM COATED ORAL DAILY
Qty: 90 TABLET | Refills: 1 | OUTPATIENT
Start: 2024-09-23

## 2024-09-24 ENCOUNTER — PATIENT OUTREACH (OUTPATIENT)
Dept: CARE COORDINATION | Facility: CLINIC | Age: 54
End: 2024-09-24
Payer: COMMERCIAL

## 2024-10-01 ENCOUNTER — VIRTUAL VISIT (OUTPATIENT)
Dept: SURGERY | Facility: CLINIC | Age: 54
End: 2024-10-01
Payer: COMMERCIAL

## 2024-10-01 ENCOUNTER — TELEPHONE (OUTPATIENT)
Dept: SURGERY | Facility: CLINIC | Age: 54
End: 2024-10-01

## 2024-10-01 VITALS — HEIGHT: 64 IN | BODY MASS INDEX: 33.63 KG/M2 | WEIGHT: 197 LBS

## 2024-10-01 DIAGNOSIS — E78.00 ELEVATED CHOLESTEROL: ICD-10-CM

## 2024-10-01 DIAGNOSIS — E66.811 CLASS 1 OBESITY WITH SERIOUS COMORBIDITY AND BODY MASS INDEX (BMI) OF 33.0 TO 33.9 IN ADULT, UNSPECIFIED OBESITY TYPE: Primary | ICD-10-CM

## 2024-10-01 PROCEDURE — 99213 OFFICE O/P EST LOW 20 MIN: CPT | Mod: 95 | Performed by: PHYSICIAN ASSISTANT

## 2024-10-01 NOTE — TELEPHONE ENCOUNTER
"Prior Authorization Retail Medication Request    Medication/Dose: Zepbound 2.5 and 5 mg SQ weekly    ICD code (if different than what is on RX):  [E66.811, Z68.33] ; [E78.00]     Previously Tried and Failed:  diet and lifestyle and weight plateau with Saxenda and Wegovy    Rationale:    Phentermine:   SSRI consideration with paroxetine and buspirone  Topiramate: CNS depression with paroxetine    Hx of obesity   Ht: 5'4\"  Body mass index is 33.81 kg/m . 10/1/24  Current Weight:  197 lb (89.4 kg)  10/1/24    Initial weight with start of Saxenda 11/6/23 230#  Initial BMI: 39.48 Date: 11/6/23  Cumulative weight loss (lbs): 33  Weight Loss Percentage: 14.34%    Insurance Name:  [employer]   Insurance ID:  [id number]       Pharmacy Information (if different than what is on RX)  Name:    [facility] - [place], [place] - [facility] [address]     Phone:  [phone]     "

## 2024-10-01 NOTE — Clinical Note
Can you make sure prior Auth was started?  Thanks!  Kate Gupta PA-C   Alert-The patient is alert, awake and responds to voice. The patient is oriented to time, place, and person. The triage nurse is able to obtain subjective information.

## 2024-10-01 NOTE — PATIENT INSTRUCTIONS
Nice to talk with you today. Below is the plan discussed.-  Kate Gupta PA-C     Pt Instructions:  Let's switch from 2.4 mg Wegovy to 7.5 mg Zepbound once weekly. Do this dose for at least one month - if tolerating well, you can increase to 10 mg Zepbound.    Goals:  Continue to Focus on healthy food choices - prioritizing protein and veggies in your meals. I recommend eating every 4-6 hours to reduce the risk of low blood sugars and try to minimize snacking between meals. Stay well hydrated though the day as well.  Great job with exercising - please continue to invest in yourself with regular exercise. Continue to strive for a range for 150-300 minutes of exercise for weight maintenance and incorporating strength training twice a week to help preserve muscle!    Hoping switching that med is helpful for you!    Follow up:    Call [phone] to schedule next visit in next available. Be in touch on [employer] for refills/concerns between visits    Zepbound (Tirzepatide)    Zepbound (Tirzepatide): is an injectable prescription medicine recently FDA approved for adults with obesity or overweight with an additional condition affected by their weight.      It is given as a shot once a week. It activates the body's receptors for GIP (glucose-dependent insulinotropic polypeptide) and GLP-1 (glucagon-like peptide-1) receptor, two naturally occurring hormones that help tell the brain that you are full. It also works is by slowing down how quickly food leaves your stomach.     You will start to feel bella more quickly, notice portion changes and eat less often between meals.  Patients are advised to eat slowly and purposefully. Give yourself less portions. You may find after starting this medication you have a new point of fullness. Maintain consistent eating schedule with meals +/- snacks and get in good hydration.    Dosing:  Initial dose: 2.5 mg injected subcutaneously once weekly for 4 weeks  Further dose changes can  include: increase to 5 mg once weekly for 4 weeks, then 7.5 mg once weekly for 4 weeks, then 10 mg once weekly for 4 weeks then 12.5 mg once weekly for 4 weeks, then 15 mg (maximum dose) once weekly.    Dose changes are based on how you are tolerating the current dose, what benefits you are seeing at the current dose and if improvement in effectiveness of the drug is needed. The goal is to find the dose that works best for you with the least amount of side effects. You may find you reach this at a lower dose than the maximum dose.     Side effects: Common side effects include nausea, Heartburn,diarrhea, constipation. This is worse when starting the medication and with dose dose escalation.  It does improve with time. Stay adequately hydrated and eat small portions to decrease the risk of side effects.     The risk of pancreatitis (inflammation of the pancreas) has been associated with this type of medication, but is very rare.  If you have had pancreatitis in the past, this medication may not be for you. If you experience persistent severe abdominal pain (sometimes radiating to the back potentially accompanied by vomiting and have a fever), stop the medication and contact your provider immediately for assessment. They will do a blood test to check for pancreatitis.       Caution:   Tirzepatide (Zepbound, Mounjaro) May decrease effectiveness of your oral birth control while starting and with dose adjustments.  Use backup forms of birth control if necessary.      For any questions or concerns please send a Elysia message to our team or call our weight management call center at [phone] during regular business hours.     There is a small chance you may have some low blood sugar after taking the medication.   The signs of low blood sugar are:  Weakness  Shaky   Hungry  Sweating  Confusion      See below for ways to treat low blood sugar without adding in lots of extra calories.      Treating Low Blood Sugar    If  you have symptoms of low blood sugar (sweating, shaking, dizzy, confused) eat 15 grams of carbs and wait 15 minutes:    Glucose Tabs are best for sugars under 70 -  Dex4 or BD Glucose tablets are good, you will need to take 3-4 of these to equal 15 grams.     One small box of raisins  4 oz fruit juice box or   cup fruit juice  1 small apple  1 small banana    cup canned fruit in water    English muffin or a slice of bread with jelly   1 low fat frozen waffle with sugar-free syrup    cup cottage cheese with   cup frozen or fresh blueberries  1 cup skim or low-fat milk    cup whole grain cereal  4-6 crackers such as Triscuits      This medication is usually not covered by insurance and can be quite expensive. Sometimes a prior authorization is required, which may take up to 1-2 weeks for an insurance company to make a decision if they will cover the medication. Please be patient, you will be notified after a decision has been made.    Contact the nurse via [employer] or call [phone] if you have any questions or concerns. (Do not stop taking it if you don't think it's working. For some people it works without them knowing it.)     In order to get refills of this or any medication we prescribe you must be seen in the medical weight mgmt clinic every 2-4 months.    Using Your Mounjaro/Zepbound Pen  Medicine (tirzepatide)    Storing your pens  Store your pens in the refrigerator until you are ready to use them. Don't let them freeze.  Your pen may be stored at room temperature for 21 days or fewer. Just make sure the temperature doesn't get higher than 86 or lower than 46 degrees Fahrenheit.   Protect the pens from light.  Never use any pens that have .    Check your pen before use:  The liquid in the pen window should be clear or light yellow. Bubbles are okay to see.   Do not use the pen if you can see the window contains any specks, is cloudy, or has changed color.  Make sure you have the medication and dose  your health care provider prescribed.    Getting ready to inject:   1. Wash and dry your hands well.  2. Make sure the counter you use to place your supplies on is clean.  3. Make sure your injection time will not be interrupted by children or pets.  4. Have alcohol wipes or alcohol and cotton balls available to clean the injection site.   5. Choose your injection site. It can be on your stomach, back of upper arms, or upper legs. Remember to change your injection site each time you inject. Try to be at least 1 inch away from the previous one. Stay at least 1 inch away from your belly button.       Inject your dose:  1. Each pen is prefilled with one dose of medicine.    2. Pull off the grey cap at the bottom of the pen. Throw it in the trash. Do not put the cap back on the pen.   3. Make sure your pen is in the locked position. You will find the lock at the top of the pen.   4. Clean the injection site with alcohol.   5. Place the clear part of the pen against your skin. Unlock the pen by turning it to the unlock  position at the top.   6. Press and hold the purple injection button at the top of the pen. Listen for 2 clicks. The last click tells you the injection has been completed.   7. This injection is given 1 day a week. If you need to change the day you inject, there needs to be at least 3 days or 72 hours between the two doses.  8. If you miss a dose, you may take the dose within 4 days or 96 hours of the missed dose.   9. Your injection may be best tolerated if given at night.     Disposing of your pens:  Dispose of your pens in a puncture-resistant container (hard plastic bottle) or Sharps container.  Check with the county you live in on how to dispose of the container.  Do not recycle the container with used pens.       Of note, you may not be able to  your medication right away. It may require a prior authorization from your insurance company. This may take a week or more.

## 2024-10-03 NOTE — TELEPHONE ENCOUNTER
Prior Authorization Approval    Medication: ZEPBOUND 7.5 MG/0.5ML SC SOAJ  Authorization Effective Date: 10/3/2024  Authorization Expiration Date: 5/31/2025  Approved Dose/Quantity: 2ml/28ds  Reference #: [id number]   Insurance Company: [employer] [phone] Fax [phone]  Expected CoPay: $    CoPay Card Available:      Financial Assistance Needed: NA  Which Pharmacy is filling the prescription: [facility] - [place] [address]   Pharmacy Notified: Yes  Patient Notified: Yes, via [employer]      PA Initiation    Medication: ZEPBOUND 7.5 MG/0.5ML SC SOAJ  Insurance Company: [employer] [phone] Fax [phone]  Pharmacy Filling the Rx: [facility] - [place] [address]   Filling Pharmacy Phone: [phone]  Start Date: 10/3/2024

## 2024-10-16 ENCOUNTER — MYC MEDICAL ADVICE (OUTPATIENT)
Dept: FAMILY MEDICINE | Facility: CLINIC | Age: 54
End: 2024-10-16
Payer: COMMERCIAL

## 2024-10-21 ENCOUNTER — MYC MEDICAL ADVICE (OUTPATIENT)
Dept: FAMILY MEDICINE | Facility: CLINIC | Age: 54
End: 2024-10-21
Payer: COMMERCIAL

## 2024-12-02 DIAGNOSIS — F41.1 GENERALIZED ANXIETY DISORDER: ICD-10-CM

## 2024-12-02 DIAGNOSIS — F33.2 MAJOR DEPRESSIVE DISORDER, RECURRENT, SEVERE WITHOUT PSYCHOTIC FEATURES (H): ICD-10-CM

## 2024-12-05 RX ORDER — PAROXETINE 40 MG/1
40 TABLET, FILM COATED ORAL EVERY MORNING
Qty: 90 TABLET | Refills: 1 | Status: SHIPPED | OUTPATIENT
Start: 2024-12-05

## 2024-12-08 DIAGNOSIS — F33.2 MAJOR DEPRESSIVE DISORDER, RECURRENT, SEVERE WITHOUT PSYCHOTIC FEATURES (H): ICD-10-CM

## 2024-12-08 DIAGNOSIS — F41.1 GENERALIZED ANXIETY DISORDER: ICD-10-CM

## 2024-12-08 DIAGNOSIS — E78.5 HYPERLIPIDEMIA WITH TARGET LDL LESS THAN 100: ICD-10-CM

## 2024-12-09 RX ORDER — ATORVASTATIN CALCIUM 40 MG/1
40 TABLET, FILM COATED ORAL DAILY
Qty: 90 TABLET | Refills: 1 | OUTPATIENT
Start: 2024-12-09

## 2024-12-09 RX ORDER — BUSPIRONE HYDROCHLORIDE 10 MG/1
10 TABLET ORAL DAILY
Qty: 90 TABLET | Refills: 1 | OUTPATIENT
Start: 2024-12-09

## 2024-12-26 DIAGNOSIS — F41.1 GENERALIZED ANXIETY DISORDER: ICD-10-CM

## 2024-12-26 DIAGNOSIS — F33.2 MAJOR DEPRESSIVE DISORDER, RECURRENT, SEVERE WITHOUT PSYCHOTIC FEATURES (H): ICD-10-CM

## 2024-12-26 RX ORDER — BUSPIRONE HYDROCHLORIDE 10 MG/1
10 TABLET ORAL DAILY
Qty: 90 TABLET | Refills: 1 | Status: SHIPPED | OUTPATIENT
Start: 2024-12-26

## 2025-01-05 ENCOUNTER — HEALTH MAINTENANCE LETTER (OUTPATIENT)
Age: 55
End: 2025-01-05

## 2025-02-24 ENCOUNTER — MYC REFILL (OUTPATIENT)
Dept: SURGERY | Facility: CLINIC | Age: 55
End: 2025-02-24
Payer: COMMERCIAL

## 2025-02-24 ENCOUNTER — MYC REFILL (OUTPATIENT)
Dept: FAMILY MEDICINE | Facility: CLINIC | Age: 55
End: 2025-02-24
Payer: COMMERCIAL

## 2025-02-24 ENCOUNTER — MYC MEDICAL ADVICE (OUTPATIENT)
Dept: FAMILY MEDICINE | Facility: CLINIC | Age: 55
End: 2025-02-24
Payer: COMMERCIAL

## 2025-02-24 DIAGNOSIS — F33.2 MAJOR DEPRESSIVE DISORDER, RECURRENT, SEVERE WITHOUT PSYCHOTIC FEATURES (H): ICD-10-CM

## 2025-02-24 DIAGNOSIS — E66.811 CLASS 1 OBESITY WITH SERIOUS COMORBIDITY AND BODY MASS INDEX (BMI) OF 33.0 TO 33.9 IN ADULT, UNSPECIFIED OBESITY TYPE: ICD-10-CM

## 2025-02-24 DIAGNOSIS — F41.1 GENERALIZED ANXIETY DISORDER: ICD-10-CM

## 2025-02-24 DIAGNOSIS — Z63.8 FAMILY DISCORD: Primary | ICD-10-CM

## 2025-02-24 DIAGNOSIS — E78.00 ELEVATED CHOLESTEROL: ICD-10-CM

## 2025-02-24 RX ORDER — PAROXETINE 40 MG/1
40 TABLET, FILM COATED ORAL EVERY MORNING
Qty: 90 TABLET | Refills: 1 | OUTPATIENT
Start: 2025-02-24

## 2025-02-24 RX ORDER — BUSPIRONE HYDROCHLORIDE 10 MG/1
10 TABLET ORAL DAILY
Qty: 90 TABLET | Refills: 1 | OUTPATIENT
Start: 2025-02-24

## 2025-02-24 SDOH — SOCIAL STABILITY - SOCIAL INSECURITY: OTHER SPECIFIED PROBLEMS RELATED TO PRIMARY SUPPORT GROUP: Z63.8

## 2025-02-24 NOTE — TELEPHONE ENCOUNTER
Routing to provider to review and advise. Referral pended, need diagnosis.     MORGAN SANTAMARIA RN on 2/24/2025 at 1:50 PM   [facility]

## 2025-02-26 NOTE — TELEPHONE ENCOUNTER
Talked to pt and gave her the # for the family referral.  please call [phone].   Also changed preventative earlier for her.

## 2025-03-03 ENCOUNTER — PATIENT OUTREACH (OUTPATIENT)
Dept: CARE COORDINATION | Facility: CLINIC | Age: 55
End: 2025-03-03
Payer: COMMERCIAL

## 2025-03-04 ENCOUNTER — E-VISIT (OUTPATIENT)
Dept: URGENT CARE | Facility: CLINIC | Age: 55
End: 2025-03-04
Payer: COMMERCIAL

## 2025-03-04 ENCOUNTER — MYC MEDICAL ADVICE (OUTPATIENT)
Dept: FAMILY MEDICINE | Facility: CLINIC | Age: 55
End: 2025-03-04

## 2025-03-04 DIAGNOSIS — J01.90 ACUTE SINUSITIS WITH SYMPTOMS > 10 DAYS: Primary | ICD-10-CM

## 2025-03-05 NOTE — PATIENT INSTRUCTIONS
Acute Sinusitis: Care Instructions  Overview     Acute sinusitis is an inflammation of the mucous membranes inside the nose and sinuses. Sinuses are the hollow spaces in your skull around the eyes and nose. Acute sinusitis often follows a cold. Acute sinusitis causes thick, discolored mucus that drains from the nose or down the back of the throat. It also can cause pain and pressure in your head and face along with a stuffy or blocked nose.  In most cases, sinusitis gets better on its own in 1 to 2 weeks. But some mild symptoms may last for several weeks. Sometimes antibiotics are needed if there is a bacterial infection.  Follow-up care is a key part of your treatment and safety. Be sure to make and go to all appointments, and call your doctor if you are having problems. It's also a good idea to know your test results and keep a list of the medicines you take.  How can you care for yourself at home?  Use saline (saltwater) nasal washes. This can help keep your nasal passages open and wash out mucus and allergens.  You can buy saline nose washes at a grocery store or drugstore. Follow the instructions on the package.  You can make your own at home. Add 1 teaspoon of non-iodized salt and 1 teaspoon of baking soda to 2 cups of distilled or boiled and cooled water. Fill a squeeze bottle or a nasal cleansing pot (such as a neti pot) with the nasal wash. Then put the tip into your nostril, and lean over the sink. With your mouth open, gently squirt the liquid. Repeat on the other side.  Try a decongestant nasal spray like oxymetazoline (Afrin). Do not use it for more than 3 days in a row. Using it for more than 3 days can make your congestion worse.  If needed, take an over-the-counter pain medicine, such as acetaminophen (Tylenol), ibuprofen (Advil, Motrin), or naproxen (Aleve). Read and follow all instructions on the label.  If the doctor prescribed antibiotics, take them as directed. Do not stop taking them just  "because you feel better. You need to take the full course of antibiotics.  Be careful when taking over-the-counter cold or flu medicines and Tylenol at the same time. Many of these medicines have acetaminophen, which is Tylenol. Read the labels to make sure that you are not taking more than the recommended dose. Too much acetaminophen (Tylenol) can be harmful.  Try a steroid nasal spray. It may help with your symptoms.  Breathe warm, moist air. You can use a steamy shower, a hot bath, or a sink filled with hot water. Avoid cold, dry air. Using a humidifier in your home may help. Follow the directions for cleaning the machine.  When should you call for help?   Call your doctor now or seek immediate medical care if:    You have new or worse swelling, redness, or pain in your face or around one or both of your eyes.     You have double vision or a change in your vision.     You have a high fever.     You have a severe headache and a stiff neck.     You have mental changes, such as feeling confused or much less alert.   Watch closely for changes in your health, and be sure to contact your doctor if:    You are not getting better as expected.   Where can you learn more?  Go to https://www.[employer].net/patiented  Enter I933 in the search box to learn more about \"Acute Sinusitis: Care Instructions.\"  Current as of: September 27, 2023  Content Version: 14.3    2024 [employer].   Care instructions adapted under license by your healthcare professional. If you have questions about a medical condition or this instruction, always ask your healthcare professional. [employer] disclaims any warranty or liability for your use of this information.    Dear Hayde Monique    After reviewing your responses, I've been able to diagnose you with sinusitis.      Based on your responses and diagnosis, I have prescribed Augmentin to treat your symptoms. I have sent this to your pharmacy.?     It is also important to " stay well hydrated, get lots of rest and take over-the-counter decongestants,?tylenol?or ibuprofen if you?are able to?take those medications per your primary care provider to help relieve discomfort.?     It is important that you take?all of?your prescribed medication even if your symptoms are improving after a few doses.? Taking?all of?your medicine helps prevent the symptoms from returning.?     If your symptoms worsen, you develop severe headache, vomiting, high fever (>102), or are not improving in 7 days, please contact your primary care provider for an appointment or visit any of our convenient Walk-in Care or Urgent Care Centers to be seen which can be found on our website?here.?     Thanks again for choosing?us?as your health care partner,?   ?  Khushi Walters, LAURITA?

## 2025-03-13 NOTE — PROGRESS NOTES
"Hayde is a 54 year old who is being evaluated via a billable video visit.      The patient has been notified of following:     \"This video visit will be conducted via a call between you and your physician/provider. We have found that certain health care needs can be provided without the need for an in-person physical exam.  This service lets us provide the care you need with a video conversation.  If a prescription is necessary we can send it directly to your pharmacy.  If lab work is needed we can place an order for that and you can then stop by our lab to have the test done at a later time.    Video visits are billed at different rates depending on your insurance coverage.  Please reach out to your insurance provider with any questions.    If during the course of the call the physician/provider feels a video visit is not appropriate, you will not be charged for this service.\"    Patient has given verbal consent for Video visit? Yes    How would you like to obtain your AVS? MyChart    If the video visit is dropped, the invitation should be resent by: Send to e-mail at: [email or web address]    Will anyone else be joining your video visit? No    I    Video-Visit Details    Type of service:  Video Visit    Originating Location (pt. Location): Home in [place]     Distant Location (provider location):   [facility]    Platform used for Video Visit: [email or web address]    Video Start Time: 11:41 AM    Video End Time:11:57 AM    Joined 11:39  Return Weight management Surgery Note    RE: Hayde Monique  MR#: [id number]  : 1970  VISIT DATE: 2025    Dear Dominik Arizmendi,    I had the pleasure of seeing your patient, Hayde Monique, in my post-bariatric surgery assessment clinic.    Assessment & Plan   Problem List Items Addressed This Visit       Class 1 obesity with serious comorbidity and body mass index (BMI) of 32.0 to 32.9 in adult, unspecified obesity type     Patient was " congratulated on wt loss success thus far. Healthy habits to assist with further weight loss were discussed. Hayde will continue Zepbound. We reviewed recommendations for muscle conservation including eating three meals daily, good protein intake, and strength training. Added labs for hair loss but suspect related to weight change - reviewed need for protein/three meals daily.           Relevant Medications    tirzepatide-Weight Management (ZEPBOUND) 12.5 MG/0.5ML prefilled pen    Other Relevant Orders    TSH with free T4 reflex    Iron and iron binding capacity    Ferritin    Elevated cholesterol     Discussed in clinic visit. Anticipate improvement with weight loss.  Also reviewed could be genetic and discussed do agree w/PCP recs to restart cholesterol medication w/labs up this winter.         Relevant Medications    tirzepatide-Weight Management (ZEPBOUND) 12.5 MG/0.5ML prefilled pen        PATIENT INSTRUCTIONS:  Pt Instructions:  Continue 12.5 mg Zepbound weekly - be in touch on MyChart for dose changes.  Labs ordered.  Please call [phone] to set up a lab appt for screening with your hair loss - handouts below as well for reading.   Agree with looking at restarting the cholesterol medication from your February labs.     Goals:  Focus on healthy food choices - prioritizing protein and veggies in your meals. I recommend eating every 4-6 hours to reduce the risk of low blood sugars and try to minimize snacking between meals. Stay well hydrated though the day as well. Try for 60-90 g protein. Consider connecting with dietitians as well.   Great job with exercising - please continue to invest in yourself with regular exercise. Continue to strive for a range for 150-300 minutes of exercise for weight maintenance and incorporating strength training twice-three times a week to help preserve muscle!      Follow up:    Call [phone] to schedule next visit in next available for medical management. Be in touch on  [employer] for refills/concerns between visits    23 minutes spent on the date of the encounter doing chart review, history and exam, result review, counseling, developing plan of care, documentation, and further activities as noted        CHIEF COMPLAINT: Weight management follow-up -scheduling computer as return bariatric surgery but patient is actually return medical management and utilizing Zepbound.  She decided to put progressing toward surgery on hold.    Questionnaire she discussed eating 2 meals a day and doing walking for exercising.    Coordinating on MyChart Zepbound dose was titrated to 12.5 mg. No bothersome side effects - does feel it is a good dose. She feels she could be doing more but feels it is a good dose otherwise. Only side effect - noting some weight loss now but had thick hair from prior. Trying different shampoos and doing castor oil/rosemary.     Doing two meals - knows needs to get more protein in. She got some bone broth - has 10 g of protein.     Will be going back to office at 50% so main concern is weather. She does feel she gets more exercise when in person. Just bought a kettle ball as well as a vibrating plate to try for muscle toning and has resistance bands.     HISTORY OF PRESENT ILLNESS:      3/28/2025     9:48 AM   Questions Regarding Prior Weight Loss Surgery Reviewed With Patient   I had the following weight loss procedure Sleeve Gastrectomy   What year was your surgery? 2023   How has your weight changed since your last visit? I have stayed about the same   Do you currently have any of the following None of the above   Do you have any concerns today? No       Weight History:      3/28/2025     9:48 AM   --   What is your highest lifetime weight? 234   What is your lowest weight since surgery? (In pounds) 192     Initial Weight (lbs): 234.8 lbs  Weight: 192 lb (87.1 kg) (Patient reported)  Last Visits Weight: 197 lb (89.4 kg)  Cumulative weight loss (lbs): 42.8  Weight Loss  Percentage: 18.23%    Wt Readings from Last 10 Encounters:   04/01/25 192 lb (87.1 kg)   02/28/25 192 lb 3.2 oz (87.2 kg)   10/01/24 197 lb (89.4 kg)   05/28/24 208 lb (94.3 kg)   04/18/24 214 lb (97.1 kg)   02/12/24 221 lb (100.2 kg)   01/08/24 224 lb (101.6 kg)   11/06/23 230 lb (104.3 kg)   10/05/23 232 lb (105.2 kg)   08/04/23 234 lb 12.8 oz (106.5 kg)              3/28/2025     9:48 AM   Questions Regarding Co-Morbidities and Health Concerns Reviewed With Patient   Pre-diabetes Never   Diabetes II Never   High Blood Pressure Gone Away   High cholesterol Stayed the same   Heartburn/Reflux Gone away   Sleep apnea Never   PCOS Never   Back pain Improved   Joint pain Improved   Lower leg swelling Improved           3/28/2025     9:48 AM   Eating Habits   How many meals do you eat per day? 2   Do you snack between meals? No   How much food are you eating at each meal? Greater than 1 cup   Are you able to separate your meals and liquids by at least 30 minutes? Yes   Are you able to avoid liquid calories? Yes           3/28/2025     9:48 AM   Exercise Questions Reviewed With Patient   How often do you exercise? 1 to 2 times per week   What is the duration of your exercise (in minutes)? 30 Minutes   What types of exercise do you do? walking   What keeps you from being more active? I should be more active but I just have not gotten around to it       Social History:      3/28/2025     9:48 AM   --   Are you smoking? No   Are you drinking alcohol? Yes   How much alcohol? 2 a week       Medications:  Current Outpatient Medications   Medication Sig Dispense Refill    busPIRone (BUSPAR) 10 MG tablet Take 1 tablet (10 mg) by mouth daily. 90 tablet 1    PARoxetine (PAXIL) 40 MG tablet Take 1 tablet (40 mg) by mouth every morning. 90 tablet 1    tirzepatide-Weight Management (ZEPBOUND) 12.5 MG/0.5ML prefilled pen Inject 0.5 mLs (12.5 mg) subcutaneously every 7 days. 2 mL 3    atorvastatin (LIPITOR) 40 MG tablet TAKE 1 TABLET  (40 MG) BY MOUTH DAILY (Patient not taking: Reported on 4/1/2025) 90 tablet 1     No current facility-administered medications for this visit.         3/28/2025     9:48 AM   --   Do you avoid NSAIDs such as (Ibuprofen, Aleve, Naproxen, Advil)? Yes       ROS:  GI:       3/28/2025     9:48 AM   --   Vomiting No   Diarrhea No   Constipation No   Swallowing trouble No   Abdominal pain No   Heartburn No     Skin:       3/28/2025     9:48 AM   BAR RBS ROS - SKIN   Rash in skin folds No     Psych:       3/28/2025     9:48 AM   --   Depression No   Anxiety No     Female Only:       3/28/2025     9:48 AM   BAR RBS ROS -    Female only Loss of menstrual cycles    Post-menopausal   Stress urinary incontinence No       LABS/IMAGING/MEDICAL RECORDS REVIEW:   Hemoglobin A1C   Date Value Ref Range Status   02/28/2025 4.7 0.0 - 5.6 % Final     Comment:     Normal <5.7%   Prediabetes 5.7-6.4%    Diabetes 6.5% or higher     Note: Adopted from ADA consensus guidelines.     Vitamin D, Total (25-Hydroxy)   Date Value Ref Range Status   02/28/2025 25 20 - 50 ng/mL Final     Comment:     optimum levels     Vitamin B12   Date Value Ref Range Status   06/23/2020 285 193 - 986 pg/mL Final     Hemoglobin   Date Value Ref Range Status   02/28/2025 13.4 11.7 - 15.7 g/dL Final     Ferritin   Date Value Ref Range Status   07/22/2020 84 8 - 252 ng/mL Final     Iron   Date Value Ref Range Status   12/21/2016 52 35 - 180 ug/dL Final     Iron Binding Cap   Date Value Ref Range Status   12/21/2016 463 (H) 240 - 430 ug/dL Final     Iron Saturation Index   Date Value Ref Range Status   12/21/2016 11 (L) 15 - 46 % Final     Creatinine   Date Value Ref Range Status   02/28/2025 0.77 0.51 - 0.95 mg/dL Final     Urea Nitrogen   Date Value Ref Range Status   02/28/2025 10.8 6.0 - 20.0 mg/dL Final     ALT   Date Value Ref Range Status   02/28/2025 20 0 - 50 U/L Final     AST   Date Value Ref Range Status   02/28/2025 19 0 - 45 U/L Final        BP Readings  "from Last 6 Encounters:   02/28/25 128/76   04/18/24 124/74   10/05/23 118/78   08/03/23 138/82   06/22/23 130/88   05/30/23 134/80       Pulse Readings from Last 6 Encounters:   02/28/25 74   04/18/24 78   10/05/23 75   08/03/23 83   05/30/23 99   05/03/23 86         PHYSICAL EXAMINATION:  Ht 5' 4\" (1.626 m)   Wt 192 lb (87.1 kg)   LMP  (LMP Unknown)   BMI 32.96 kg/m    GENERAL: Healthy, alert and no distress  EYES: Eyes grossly normal to inspection.    RESP: No audible wheeze, cough, or visible cyanosis.  No increased work of breathing.    SKIN: Visible skin clear. No significant rash, abnormal pigmentation or lesions.  NEURO: Mentation and speech appropriate for age.  PSYCH: Mentation appears normal, affect normal/bright, judgement and insight intact, normal speech and appearance well-groomed.      Sincerely,      Kate Gupta PA-C       "

## 2025-03-25 ENCOUNTER — PATIENT OUTREACH (OUTPATIENT)
Dept: CARE COORDINATION | Facility: CLINIC | Age: 55
End: 2025-03-25
Payer: COMMERCIAL

## 2025-04-01 ENCOUNTER — VIRTUAL VISIT (OUTPATIENT)
Dept: SURGERY | Facility: CLINIC | Age: 55
End: 2025-04-01
Payer: COMMERCIAL

## 2025-04-01 VITALS — HEIGHT: 64 IN | BODY MASS INDEX: 32.78 KG/M2 | WEIGHT: 192 LBS

## 2025-04-01 DIAGNOSIS — E78.00 ELEVATED CHOLESTEROL: ICD-10-CM

## 2025-04-01 DIAGNOSIS — E66.811 CLASS 1 OBESITY WITH SERIOUS COMORBIDITY AND BODY MASS INDEX (BMI) OF 32.0 TO 32.9 IN ADULT, UNSPECIFIED OBESITY TYPE: ICD-10-CM

## 2025-04-01 PROCEDURE — 98005 SYNCH AUDIO-VIDEO EST LOW 20: CPT | Performed by: PHYSICIAN ASSISTANT

## 2025-04-01 NOTE — ASSESSMENT & PLAN NOTE
Patient was congratulated on wt loss success thus far. Healthy habits to assist with further weight loss were discussed. Hayde will continue Zepbound. We reviewed recommendations for muscle conservation including eating three meals daily, good protein intake, and strength training. Added labs for hair loss but suspect related to weight change - reviewed need for protein/three meals daily.

## 2025-04-01 NOTE — PATIENT INSTRUCTIONS
Nice to talk with you today. Below is the plan discussed.-  Kate Gupta PA-C     Pt Instructions:  Continue 12.5 mg Zepbound weekly - be in touch on [employer] for dose changes.  Labs ordered.  Please call [phone] to set up a lab appt for screening with your hair loss - handouts below as well for reading.   Agree with looking at restarting the cholesterol medication from your February labs.     Goals:  Focus on healthy food choices - prioritizing protein and veggies in your meals. I recommend eating every 4-6 hours to reduce the risk of low blood sugars and try to minimize snacking between meals. Stay well hydrated though the day as well. Try for 60-90 g protein. Consider connecting with dietitians as well.   Great job with exercising - please continue to invest in yourself with regular exercise. Continue to strive for a range for 150-300 minutes of exercise for weight maintenance and incorporating strength training twice-three times a week to help preserve muscle!      Follow up:    Call [phone] to schedule next visit in next available for medical management. Be in touch on [facility] for refills/concerns between visits    Strength Training  Strength training is exercise that involves your own body weight or equipment to build muscle, endurance, and strength. Increasing muscle helps increase your metabolism.     Muscle Conditioning: Helping You Get and Stay Fit  [email or web address]    Muscle Conditionin Exercises    Resistance Training with Free Weights: 3 Exercises    Resistance Training with Surgical Tubin Exercises    Seated Exercises for Arms and Legs: 11 Exercises    Stretchin Exercises    No Equipment Home Exercise Lists from [facility] [employer]   [email or web address]    Strength training YouTube workouts  [email or web address]          LEAN PROTEIN  SOURCES      Protein Source Portion Calories Grams of Protein                           Nonfat, plain Greek yogurt    (10 grams sugar or less) 3/4 cup (6 oz)  12-17   Light Yogurt (10 grams sugar or less) 3/4 cup (6 oz)  6-8   Protein Shake 1 shake 110-180 15-30   Skim/1% Milk or lactose-free milk 1 cup ( 8 oz)  8   Plain or light, flavored soymilk 1 cup  7-8   Plain or light, hemp milk 1 cup 110 6   Fat Free or 1% Cottage Cheese 1/2 cup 90 15   Part skim ricotta cheese 1/2 cup 100 14   Part skim or reduced fat cheese slices 1 ounce 65-80 8     Mozzarella String Cheese 1 80 8   Canned tuna, chicken, crab or salmon  (canned in water)  1/2 cup 100 15-20   White fish (broiled, grilled, baked) 3 ounces 100 21   [place]/Tuna (broiled, grilled, baked) 3 ounces 150-180 21   Shrimp, Scallops, Lobster, Crab 3 ounces 100 21   Pork loin, Pork Tenderloin 3 ounces 150 21   Boneless, skinless chicken /turkey breast                          (broiled, grilled, baked) 3 ounces 120 21   [place], [place], [place], and Venison 3 ounces 120 21   Lean cuts of red meat and pork (sirloin,   round, tenderloin, flank, ground 93%-96%) 3 ounces 170 21   Lean or Extra Lean Ground Turkey 1/2 cup 150 20   90-95% Lean [place] Burger 1 melissa 140-180 21   Low-fat casserole with lean meat 3/4 cup 200 17   Luncheon Meats                                                        (turkey, lean ham, roast beef, chicken) 3 ounces 100 21   Egg (boiled, poached, scrambled) 1 Egg 60 7   Egg Substitute 1/2 cup 70 10   Nuts (limit to 1 serving per day)  3 Tbsp. 150 7   Nut Supai (peanut, almond)  Limit to 1 serving or less daily 1 Tbsp. 90 4   Soy Burger (varies) 1  15   Garbanzo, Black, Coy Beans 1/2 cup 110 7   Refried Beans 1/2 cup 100 7   Kidney and Lima beans 1/2 cup 110 7   Tempeh 3 oz 175 18   Vegan crumbles 1/2 cup 100 14   Tofu 1/2 cup 110 14   Chili (beans and extra lean beef or turkey) 1 cup 200 23   Lentil Stew/Soup 1 cup 150  12   Black Bean Soup 1 cup 175 12     Telogen Effluvium (Hair Loss)  What Is It?  At any given time, about 85% to 90% of the hairs on the average person's head are actively growing (the anagen phase) and the others are resting (the telogen phase). Typically, a hair is in the anagen phase for two to four years, then enters the telogen phase, rests for about two to four months, and then falls out and is replaced by a new, growing hair. The average person naturally loses about 100 hairs a day.  In a person with telogen effluvium, some body change or shock pushes more hairs into the telogen phase. About 30% of the hairs stop growing and go into the resting phase before falling out. So you may lose an average of 300 hairs a day instead of 100.  Telogen effluvium can be triggered by a number of different events, including:  Surgery (like wt. loss surgery)  Extreme weight loss  Extreme change in diet  Abrupt hormonal changes, including those associated with childbirth and menopause  Iron deficiency  Hypothyroidism or hyperthyroidism  Because hairs that enter the telogen phase rest in place for two to four months before falling out, you may not notice any hair loss until two to four months after the event that caused the problem. Telogen effluvium rarely lasts longer than six months.  Although losing a great number of hairs within a short time can be frightening, the condition is  temporary. Each hair pushed out is replaced by a new, growing hair. Because hair on the scalp grows slowly, your hair may feel or look thinner than usual for a time but fullness will return as the new hairs grow in.  Expected Duration  Typically, hair loss begins two to four months after the event that triggered the problem, and lasts approximately six months. New hairs begin growing immediately after the hair falls out, but significant growth may not be noticed for several months.  Prevention  Nothing can be done to prevent most of the types of  physical shock that can start telogen effluvium.   Treatment  No treatment for active telogen effluvium has been proven effective.

## 2025-04-01 NOTE — ASSESSMENT & PLAN NOTE
Discussed in clinic visit. Anticipate improvement with weight loss.  Also reviewed could be genetic and discussed do agree w/PCP recs to restart cholesterol medication w/labs up this winter.

## 2025-05-05 ENCOUNTER — E-VISIT (OUTPATIENT)
Dept: FAMILY MEDICINE | Facility: CLINIC | Age: 55
End: 2025-05-05
Payer: COMMERCIAL

## 2025-05-05 ENCOUNTER — MYC REFILL (OUTPATIENT)
Dept: FAMILY MEDICINE | Facility: CLINIC | Age: 55
End: 2025-05-05

## 2025-05-05 DIAGNOSIS — E78.5 HYPERLIPIDEMIA WITH TARGET LDL LESS THAN 100: ICD-10-CM

## 2025-05-05 DIAGNOSIS — M54.16 LUMBAR RADICULOPATHY: Primary | ICD-10-CM

## 2025-05-05 RX ORDER — ATORVASTATIN CALCIUM 40 MG/1
40 TABLET, FILM COATED ORAL DAILY
Qty: 90 TABLET | Refills: 1 | OUTPATIENT
Start: 2025-05-05

## 2025-05-05 RX ORDER — ATORVASTATIN CALCIUM 40 MG/1
40 TABLET, FILM COATED ORAL DAILY
Qty: 90 TABLET | Refills: 1 | Status: SHIPPED | OUTPATIENT
Start: 2025-05-05

## 2025-05-06 NOTE — PATIENT INSTRUCTIONS
Thank you for choosing us for your care. I have placed the below referral(s) for you:  Orders Placed This Encounter   Procedures     Pain Management  Referral     Please click the link for your After Visit Summary to view scheduling instructions for your referral. In most cases, you will be contacted within 2 business days to schedule. If you do not hear from a representative within that time, please call [phone] to be connected to a .

## 2025-05-15 ENCOUNTER — TRANSFERRED RECORDS (OUTPATIENT)
Dept: HEALTH INFORMATION MANAGEMENT | Facility: CLINIC | Age: 55
End: 2025-05-15
Payer: COMMERCIAL

## 2025-06-10 ENCOUNTER — TRANSFERRED RECORDS (OUTPATIENT)
Dept: HEALTH INFORMATION MANAGEMENT | Facility: CLINIC | Age: 55
End: 2025-06-10
Payer: COMMERCIAL

## 2025-06-23 DIAGNOSIS — E66.811 CLASS 1 OBESITY WITH SERIOUS COMORBIDITY AND BODY MASS INDEX (BMI) OF 32.0 TO 32.9 IN ADULT, UNSPECIFIED OBESITY TYPE: Primary | ICD-10-CM

## 2025-08-19 DIAGNOSIS — E66.811 CLASS 1 OBESITY WITH SERIOUS COMORBIDITY AND BODY MASS INDEX (BMI) OF 32.0 TO 32.9 IN ADULT, UNSPECIFIED OBESITY TYPE: ICD-10-CM

## 2025-08-19 DIAGNOSIS — E78.00 ELEVATED CHOLESTEROL: ICD-10-CM

## 2025-08-19 RX ORDER — TIRZEPATIDE 12.5 MG/.5ML
INJECTION, SOLUTION SUBCUTANEOUS
Qty: 2 ML | Refills: 0 | Status: SHIPPED | OUTPATIENT
Start: 2025-08-19

## 2025-09-03 ENCOUNTER — PATIENT OUTREACH (OUTPATIENT)
Dept: CARE COORDINATION | Facility: CLINIC | Age: 55
End: 2025-09-03
Payer: COMMERCIAL

## (undated) DEVICE — ENDO BITE BLOCK ADULT OLYMPUS LATEX FREE MAJ-1632

## (undated) DEVICE — ENDO FORCEP ENDOJAW BIOPSY 2.8MMX160CM FB-220K

## (undated) DEVICE — KIT ENDO TURNOVER/PROCEDURE W/CLEAN A SCOPE LINERS 103888

## (undated) RX ORDER — FENTANYL CITRATE 50 UG/ML
INJECTION, SOLUTION INTRAMUSCULAR; INTRAVENOUS
Status: DISPENSED
Start: 2021-11-30